# Patient Record
Sex: FEMALE | Race: BLACK OR AFRICAN AMERICAN | NOT HISPANIC OR LATINO
[De-identification: names, ages, dates, MRNs, and addresses within clinical notes are randomized per-mention and may not be internally consistent; named-entity substitution may affect disease eponyms.]

---

## 2017-01-05 ENCOUNTER — LABORATORY RESULT (OUTPATIENT)
Age: 52
End: 2017-01-05

## 2017-01-05 ENCOUNTER — APPOINTMENT (OUTPATIENT)
Dept: RHEUMATOLOGY | Facility: CLINIC | Age: 52
End: 2017-01-05

## 2017-01-05 VITALS
DIASTOLIC BLOOD PRESSURE: 90 MMHG | WEIGHT: 158 LBS | HEART RATE: 75 BPM | TEMPERATURE: 97.9 F | OXYGEN SATURATION: 97 % | HEIGHT: 66 IN | SYSTOLIC BLOOD PRESSURE: 137 MMHG | BODY MASS INDEX: 25.39 KG/M2

## 2017-01-06 ENCOUNTER — CHART COPY (OUTPATIENT)
Age: 52
End: 2017-01-06

## 2017-01-09 LAB
ADJUSTED MITOGEN: >10 IU/ML
ADJUSTED TB AG: 0.01 IU/ML
ALBUMIN SERPL ELPH-MCNC: 4.2 G/DL
ALBUMIN SERPL ELPH-MCNC: 4.3 G/DL
ALP BLD-CCNC: 71 U/L
ALP BLD-CCNC: 72 U/L
ALT SERPL-CCNC: 55 U/L
ALT SERPL-CCNC: 55 U/L
ANION GAP SERPL CALC-SCNC: 17 MMOL/L
ANION GAP SERPL CALC-SCNC: 17 MMOL/L
AST SERPL-CCNC: 70 U/L
AST SERPL-CCNC: 72 U/L
BASOPHILS # BLD AUTO: 0.02 K/UL
BASOPHILS # BLD AUTO: 0.03 K/UL
BASOPHILS NFR BLD AUTO: 0.3 %
BASOPHILS NFR BLD AUTO: 0.4 %
BILIRUB SERPL-MCNC: 0.4 MG/DL
BILIRUB SERPL-MCNC: 0.5 MG/DL
BUN SERPL-MCNC: 9 MG/DL
BUN SERPL-MCNC: 9 MG/DL
CALCIUM SERPL-MCNC: 9.5 MG/DL
CALCIUM SERPL-MCNC: 9.7 MG/DL
CHLORIDE SERPL-SCNC: 100 MMOL/L
CHLORIDE SERPL-SCNC: 101 MMOL/L
CK SERPL-CCNC: 2430 U/L
CK SERPL-CCNC: 2441 U/L
CO2 SERPL-SCNC: 25 MMOL/L
CO2 SERPL-SCNC: 26 MMOL/L
CREAT SERPL-MCNC: 0.67 MG/DL
CREAT SERPL-MCNC: 0.68 MG/DL
CRP SERPL-MCNC: 0.58 MG/DL
CRP SERPL-MCNC: 0.58 MG/DL
EOSINOPHIL # BLD AUTO: 0.08 K/UL
EOSINOPHIL # BLD AUTO: 0.09 K/UL
EOSINOPHIL NFR BLD AUTO: 1.1 %
EOSINOPHIL NFR BLD AUTO: 1.3 %
ERYTHROCYTE [SEDIMENTATION RATE] IN BLOOD BY WESTERGREN METHOD: 32 MM/HR
ERYTHROCYTE [SEDIMENTATION RATE] IN BLOOD BY WESTERGREN METHOD: 32 MM/HR
GLUCOSE SERPL-MCNC: 87 MG/DL
GLUCOSE SERPL-MCNC: 91 MG/DL
HAV IGM SER QL: NONREACTIVE
HBV CORE IGG+IGM SER QL: NONREACTIVE
HBV CORE IGM SER QL: NONREACTIVE
HBV SURFACE AG SER QL: NONREACTIVE
HCT VFR BLD CALC: 42.6 %
HCT VFR BLD CALC: 43.5 %
HCV AB SER QL: NONREACTIVE
HCV S/CO RATIO: 0.09 S/CO
HGB BLD-MCNC: 13.5 G/DL
HGB BLD-MCNC: 13.7 G/DL
IGA SER QL IEP: 221 MG/DL
IGG SER QL IEP: 1480 MG/DL
IGM SER QL IEP: 113 MG/DL
IMM GRANULOCYTES NFR BLD AUTO: 0.3 %
IMM GRANULOCYTES NFR BLD AUTO: 0.3 %
LDH SERPL-CCNC: 418 U/L
LYMPHOCYTES # BLD AUTO: 1.64 K/UL
LYMPHOCYTES # BLD AUTO: 1.73 K/UL
LYMPHOCYTES NFR BLD AUTO: 23.7 %
LYMPHOCYTES NFR BLD AUTO: 24.4 %
M TB IFN-G BLD-IMP: NEGATIVE
MAN DIFF?: NORMAL
MAN DIFF?: NORMAL
MCHC RBC-ENTMCNC: 27.8 PG
MCHC RBC-ENTMCNC: 27.8 PG
MCHC RBC-ENTMCNC: 31.5 GM/DL
MCHC RBC-ENTMCNC: 31.7 GM/DL
MCV RBC AUTO: 87.8 FL
MCV RBC AUTO: 88.2 FL
MONOCYTES # BLD AUTO: 0.44 K/UL
MONOCYTES # BLD AUTO: 0.47 K/UL
MONOCYTES NFR BLD AUTO: 6.2 %
MONOCYTES NFR BLD AUTO: 6.8 %
NEUTROPHILS # BLD AUTO: 4.66 K/UL
NEUTROPHILS # BLD AUTO: 4.8 K/UL
NEUTROPHILS NFR BLD AUTO: 67.5 %
NEUTROPHILS NFR BLD AUTO: 67.7 %
PLATELET # BLD AUTO: 199 K/UL
PLATELET # BLD AUTO: 252 K/UL
POTASSIUM SERPL-SCNC: 4.6 MMOL/L
POTASSIUM SERPL-SCNC: 4.9 MMOL/L
PROT SERPL-MCNC: 7.3 G/DL
PROT SERPL-MCNC: 7.4 G/DL
QUANTIFERON GOLD NIL: 0.04 IU/ML
RBC # BLD: 4.85 M/UL
RBC # BLD: 4.93 M/UL
RBC # FLD: 16 %
RBC # FLD: 16 %
SODIUM SERPL-SCNC: 143 MMOL/L
SODIUM SERPL-SCNC: 143 MMOL/L
WBC # FLD AUTO: 6.91 K/UL
WBC # FLD AUTO: 7.09 K/UL

## 2017-01-21 ENCOUNTER — LABORATORY RESULT (OUTPATIENT)
Age: 52
End: 2017-01-21

## 2017-01-23 ENCOUNTER — CLINICAL ADVICE (OUTPATIENT)
Age: 52
End: 2017-01-23

## 2017-02-03 ENCOUNTER — CLINICAL ADVICE (OUTPATIENT)
Age: 52
End: 2017-02-03

## 2017-02-03 ENCOUNTER — APPOINTMENT (OUTPATIENT)
Dept: RHEUMATOLOGY | Facility: CLINIC | Age: 52
End: 2017-02-03

## 2017-02-03 VITALS
OXYGEN SATURATION: 97 % | TEMPERATURE: 97.7 F | BODY MASS INDEX: 24.91 KG/M2 | WEIGHT: 155 LBS | SYSTOLIC BLOOD PRESSURE: 125 MMHG | HEART RATE: 86 BPM | HEIGHT: 66 IN | DIASTOLIC BLOOD PRESSURE: 83 MMHG

## 2017-02-06 LAB
ALBUMIN SERPL ELPH-MCNC: 4.2 G/DL
ALP BLD-CCNC: 57 U/L
ALT SERPL-CCNC: 56 U/L
ANION GAP SERPL CALC-SCNC: 17 MMOL/L
AST SERPL-CCNC: 69 U/L
BASOPHILS # BLD AUTO: 0.02 K/UL
BASOPHILS NFR BLD AUTO: 0.3 %
BILIRUB SERPL-MCNC: 0.8 MG/DL
BUN SERPL-MCNC: 14 MG/DL
CALCIUM SERPL-MCNC: 10.2 MG/DL
CHLORIDE SERPL-SCNC: 99 MMOL/L
CHOLEST SERPL-MCNC: 243 MG/DL
CHOLEST/HDLC SERPL: 3.2 RATIO
CK SERPL-CCNC: 1929 U/L
CO2 SERPL-SCNC: 25 MMOL/L
CREAT SERPL-MCNC: 0.78 MG/DL
CRP SERPL-MCNC: 0.37 MG/DL
EOSINOPHIL # BLD AUTO: 0.1 K/UL
EOSINOPHIL NFR BLD AUTO: 1.5 %
ERYTHROCYTE [SEDIMENTATION RATE] IN BLOOD BY WESTERGREN METHOD: 27 MM/HR
GGT SERPL-CCNC: 53 U/L
GLUCOSE SERPL-MCNC: 83 MG/DL
HBA1C MFR BLD HPLC: 6.3 %
HCT VFR BLD CALC: 44.8 %
HDLC SERPL-MCNC: 75 MG/DL
HGB BLD-MCNC: 14.5 G/DL
IMM GRANULOCYTES NFR BLD AUTO: 0.2 %
LDLC SERPL CALC-MCNC: 154 MG/DL
LYMPHOCYTES # BLD AUTO: 2.17 K/UL
LYMPHOCYTES NFR BLD AUTO: 33.1 %
MAN DIFF?: NORMAL
MCHC RBC-ENTMCNC: 28.2 PG
MCHC RBC-ENTMCNC: 32.4 GM/DL
MCV RBC AUTO: 87.2 FL
MONOCYTES # BLD AUTO: 0.47 K/UL
MONOCYTES NFR BLD AUTO: 7.2 %
NEUTROPHILS # BLD AUTO: 3.78 K/UL
NEUTROPHILS NFR BLD AUTO: 57.7 %
PLATELET # BLD AUTO: 147 K/UL
POTASSIUM SERPL-SCNC: 4 MMOL/L
PROT SERPL-MCNC: 8 G/DL
RBC # BLD: 5.14 M/UL
RBC # FLD: 16.1 %
SODIUM SERPL-SCNC: 141 MMOL/L
TRIGL SERPL-MCNC: 72 MG/DL
WBC # FLD AUTO: 6.55 K/UL

## 2017-03-01 ENCOUNTER — RX RENEWAL (OUTPATIENT)
Age: 52
End: 2017-03-01

## 2017-03-06 ENCOUNTER — RX RENEWAL (OUTPATIENT)
Age: 52
End: 2017-03-06

## 2017-03-21 ENCOUNTER — APPOINTMENT (OUTPATIENT)
Dept: RHEUMATOLOGY | Facility: CLINIC | Age: 52
End: 2017-03-21

## 2017-03-21 VITALS
OXYGEN SATURATION: 97 % | DIASTOLIC BLOOD PRESSURE: 75 MMHG | WEIGHT: 153 LBS | SYSTOLIC BLOOD PRESSURE: 112 MMHG | HEIGHT: 66 IN | BODY MASS INDEX: 24.59 KG/M2 | HEART RATE: 58 BPM

## 2017-03-22 LAB
25(OH)D3 SERPL-MCNC: 40.1 NG/ML
ALBUMIN SERPL ELPH-MCNC: 4.5 G/DL
ALP BLD-CCNC: 56 U/L
ALT SERPL-CCNC: 41 U/L
ANION GAP SERPL CALC-SCNC: 14 MMOL/L
AST SERPL-CCNC: 59 U/L
BASOPHILS # BLD AUTO: 0.02 K/UL
BASOPHILS NFR BLD AUTO: 0.2 %
BILIRUB SERPL-MCNC: 0.6 MG/DL
BUN SERPL-MCNC: 15 MG/DL
CALCIUM SERPL-MCNC: 9.8 MG/DL
CHLORIDE SERPL-SCNC: 102 MMOL/L
CK SERPL-CCNC: 1898 U/L
CO2 SERPL-SCNC: 26 MMOL/L
CREAT SERPL-MCNC: 0.59 MG/DL
CRP SERPL-MCNC: 0.22 MG/DL
EOSINOPHIL # BLD AUTO: 0.05 K/UL
EOSINOPHIL NFR BLD AUTO: 0.5 %
ERYTHROCYTE [SEDIMENTATION RATE] IN BLOOD BY WESTERGREN METHOD: 36 MM/HR
GLUCOSE SERPL-MCNC: 90 MG/DL
HCT VFR BLD CALC: 41.5 %
HGB BLD-MCNC: 13.6 G/DL
IMM GRANULOCYTES NFR BLD AUTO: 0.1 %
LYMPHOCYTES # BLD AUTO: 1.65 K/UL
LYMPHOCYTES NFR BLD AUTO: 17.9 %
MAN DIFF?: NORMAL
MCHC RBC-ENTMCNC: 28.3 PG
MCHC RBC-ENTMCNC: 32.8 GM/DL
MCV RBC AUTO: 86.5 FL
MONOCYTES # BLD AUTO: 0.5 K/UL
MONOCYTES NFR BLD AUTO: 5.4 %
NEUTROPHILS # BLD AUTO: 6.98 K/UL
NEUTROPHILS NFR BLD AUTO: 75.9 %
PLATELET # BLD AUTO: 131 K/UL
POTASSIUM SERPL-SCNC: 3.9 MMOL/L
PROT SERPL-MCNC: 7.4 G/DL
RBC # BLD: 4.8 M/UL
RBC # FLD: 16.5 %
SODIUM SERPL-SCNC: 142 MMOL/L
WBC # FLD AUTO: 9.21 K/UL

## 2017-04-06 ENCOUNTER — APPOINTMENT (OUTPATIENT)
Dept: RHEUMATOLOGY | Facility: CLINIC | Age: 52
End: 2017-04-06

## 2017-05-11 ENCOUNTER — LABORATORY RESULT (OUTPATIENT)
Age: 52
End: 2017-05-11

## 2017-05-11 ENCOUNTER — APPOINTMENT (OUTPATIENT)
Dept: RHEUMATOLOGY | Facility: CLINIC | Age: 52
End: 2017-05-11

## 2017-05-11 VITALS
DIASTOLIC BLOOD PRESSURE: 82 MMHG | BODY MASS INDEX: 24.11 KG/M2 | TEMPERATURE: 98 F | HEIGHT: 66 IN | SYSTOLIC BLOOD PRESSURE: 138 MMHG | WEIGHT: 150 LBS | OXYGEN SATURATION: 98 %

## 2017-05-11 RX ORDER — PREDNISONE 2.5 MG/1
2.5 TABLET ORAL
Qty: 60 | Refills: 0 | Status: DISCONTINUED | COMMUNITY
Start: 2017-01-05 | End: 2017-05-11

## 2017-05-15 LAB
ALBUMIN SERPL ELPH-MCNC: 4.3 G/DL
ALP BLD-CCNC: 59 U/L
ALT SERPL-CCNC: 42 U/L
ANION GAP SERPL CALC-SCNC: 13 MMOL/L
AST SERPL-CCNC: 51 U/L
BASOPHILS # BLD AUTO: 0.04 K/UL
BASOPHILS NFR BLD AUTO: 0.5 %
BILIRUB SERPL-MCNC: 0.6 MG/DL
BUN SERPL-MCNC: 14 MG/DL
CALCIUM SERPL-MCNC: 9.7 MG/DL
CHLORIDE SERPL-SCNC: 101 MMOL/L
CHOLEST SERPL-MCNC: 248 MG/DL
CHOLEST/HDLC SERPL: 3 RATIO
CK SERPL-CCNC: 1721 U/L
CO2 SERPL-SCNC: 27 MMOL/L
CREAT SERPL-MCNC: 0.66 MG/DL
CRP SERPL-MCNC: 0.2 MG/DL
EOSINOPHIL # BLD AUTO: 0.15 K/UL
EOSINOPHIL NFR BLD AUTO: 2 %
ERYTHROCYTE [SEDIMENTATION RATE] IN BLOOD BY WESTERGREN METHOD: 36 MM/HR
FOLATE SERPL-MCNC: 16.4 NG/ML
GLUCOSE SERPL-MCNC: 95 MG/DL
HBA1C MFR BLD HPLC: 5.8 %
HCT VFR BLD CALC: 39.4 %
HDLC SERPL-MCNC: 89 MG/DL
HGB BLD-MCNC: 12.7 G/DL
IMM GRANULOCYTES NFR BLD AUTO: 0.3 %
LDLC SERPL CALC-MCNC: 146 MG/DL
LYMPHOCYTES # BLD AUTO: 2.38 K/UL
LYMPHOCYTES NFR BLD AUTO: 30.9 %
MAN DIFF?: NORMAL
MCHC RBC-ENTMCNC: 28.6 PG
MCHC RBC-ENTMCNC: 32.2 GM/DL
MCV RBC AUTO: 88.7 FL
MONOCYTES # BLD AUTO: 0.54 K/UL
MONOCYTES NFR BLD AUTO: 7 %
NEUTROPHILS # BLD AUTO: 4.56 K/UL
NEUTROPHILS NFR BLD AUTO: 59.3 %
PLATELET # BLD AUTO: NORMAL
POTASSIUM SERPL-SCNC: 4.4 MMOL/L
PROT SERPL-MCNC: 7.3 G/DL
RBC # BLD: 4.44 M/UL
RBC # FLD: 16.6 %
SODIUM SERPL-SCNC: 141 MMOL/L
TRIGL SERPL-MCNC: 67 MG/DL
TSH SERPL-ACNC: 1.8 UIU/ML
VIT B12 SERPL-MCNC: 337 PG/ML
WBC # FLD AUTO: 7.69 K/UL

## 2017-05-18 ENCOUNTER — RX RENEWAL (OUTPATIENT)
Age: 52
End: 2017-05-18

## 2017-06-14 ENCOUNTER — RX RENEWAL (OUTPATIENT)
Age: 52
End: 2017-06-14

## 2017-06-15 ENCOUNTER — LABORATORY RESULT (OUTPATIENT)
Age: 52
End: 2017-06-15

## 2017-06-15 ENCOUNTER — APPOINTMENT (OUTPATIENT)
Dept: RHEUMATOLOGY | Facility: CLINIC | Age: 52
End: 2017-06-15

## 2017-06-15 VITALS
BODY MASS INDEX: 24.59 KG/M2 | HEART RATE: 72 BPM | TEMPERATURE: 97.7 F | SYSTOLIC BLOOD PRESSURE: 120 MMHG | DIASTOLIC BLOOD PRESSURE: 80 MMHG | WEIGHT: 153 LBS | HEIGHT: 66 IN | OXYGEN SATURATION: 98 %

## 2017-06-20 LAB
25(OH)D3 SERPL-MCNC: 61.8 NG/ML
ALBUMIN SERPL ELPH-MCNC: 4.4 G/DL
ALP BLD-CCNC: 62 U/L
ALT SERPL-CCNC: 48 U/L
ANION GAP SERPL CALC-SCNC: 17 MMOL/L
APPEARANCE: CLEAR
APTT 2H P 1:4 NP PPP: 34.9 SEC
APTT 2H P INC PPP: 36.1 SEC
APTT IMM NP/PRE NP PPP: 34.5 SEC
APTT INV RATIO PPP: 38.6 SEC
AST SERPL-CCNC: 54 U/L
B BURGDOR AB SER-IMP: NEGATIVE
B BURGDOR IGG+IGM SER QL IB: NORMAL
B BURGDOR IGM PATRN SER IB-IMP: NEGATIVE
B BURGDOR18/20KD IGM SER QL IB: NORMAL
B BURGDOR18KD IGG SER QL IB: NORMAL
B BURGDOR23KD IGG SER QL IB: NORMAL
B BURGDOR23KD IGM SER QL IB: NORMAL
B BURGDOR28KD AB SER QL IB: NORMAL
B BURGDOR28KD IGG SER QL IB: NORMAL
B BURGDOR30KD AB SER QL IB: NORMAL
B BURGDOR30KD IGG SER QL IB: NORMAL
B BURGDOR31KD IGG SER QL IB: NORMAL
B BURGDOR31KD IGM SER QL IB: NORMAL
B BURGDOR39KD IGG SER QL IB: NORMAL
B BURGDOR39KD IGM SER QL IB: NORMAL
B BURGDOR41KD IGG SER QL IB: NORMAL
B BURGDOR41KD IGM SER QL IB: PRESENT
B BURGDOR45KD AB SER QL IB: NORMAL
B BURGDOR45KD IGG SER QL IB: NORMAL
B BURGDOR58KD AB SER QL IB: NORMAL
B BURGDOR58KD IGG SER QL IB: NORMAL
B BURGDOR66KD IGG SER QL IB: NORMAL
B BURGDOR66KD IGM SER QL IB: NORMAL
B BURGDOR93KD IGG SER QL IB: NORMAL
B BURGDOR93KD IGM SER QL IB: NORMAL
B2 GLYCOPROT1 AB SER QL: NEGATIVE
B2 GLYCOPROT1 IGA SERPL IA-ACNC: <5 SAU
BACTERIA: NEGATIVE
BASOPHILS # BLD AUTO: 0.02 K/UL
BASOPHILS NFR BLD AUTO: 0.3 %
BILIRUB SERPL-MCNC: 0.7 MG/DL
BILIRUBIN URINE: NEGATIVE
BLOOD URINE: NEGATIVE
BUN SERPL-MCNC: 14 MG/DL
CALCIUM SERPL-MCNC: 9.3 MG/DL
CARDIOLIPIN AB SER IA-ACNC: NEGATIVE
CHLORIDE SERPL-SCNC: 101 MMOL/L
CK SERPL-CCNC: 1883 U/L
CO2 SERPL-SCNC: 25 MMOL/L
COLOR: YELLOW
CONFIRM: 26.7 SEC
CREAT SERPL-MCNC: 0.66 MG/DL
CREAT SPEC-SCNC: 17 MG/DL
CREAT/PROT UR: NORMAL
CRP SERPL-MCNC: 0.2 MG/DL
DRVVT IMM 1:2 NP PPP: NORMAL
DRVVT SCREEN TO CONFIRM RATIO: 1.02 RATIO
ENA SS-A AB SER IA-ACNC: <0.2 AL
ENA SS-B AB SER IA-ACNC: <0.2 AL
EOSINOPHIL # BLD AUTO: 0.07 K/UL
EOSINOPHIL NFR BLD AUTO: 1.1 %
ERYTHROCYTE [SEDIMENTATION RATE] IN BLOOD BY WESTERGREN METHOD: 23 MM/HR
GLUCOSE QUALITATIVE U: NORMAL MG/DL
GLUCOSE SERPL-MCNC: 97 MG/DL
HCT VFR BLD CALC: 40.7 %
HGB BLD-MCNC: 12.8 G/DL
HYALINE CASTS: 1 /LPF
IMM GRANULOCYTES NFR BLD AUTO: 0.2 %
KETONES URINE: NEGATIVE
LEUKOCYTE ESTERASE URINE: NEGATIVE
LYMPHOCYTES # BLD AUTO: 2.13 K/UL
LYMPHOCYTES NFR BLD AUTO: 34.8 %
MAN DIFF?: NORMAL
MCHC RBC-ENTMCNC: 28.3 PG
MCHC RBC-ENTMCNC: 31.4 GM/DL
MCV RBC AUTO: 89.8 FL
MICROSCOPIC-UA: NORMAL
MONOCYTES # BLD AUTO: 0.39 K/UL
MONOCYTES NFR BLD AUTO: 6.4 %
NEUTROPHILS # BLD AUTO: 3.5 K/UL
NEUTROPHILS NFR BLD AUTO: 57.2 %
NITRITE URINE: NEGATIVE
NPP NORMAL POOLED PLASMA: 32.7 SEC
PH URINE: 6.5
PLATELET # BLD AUTO: 159 K/UL
POTASSIUM SERPL-SCNC: 4.1 MMOL/L
PROT SERPL-MCNC: 7.9 G/DL
PROT UR-MCNC: <4 MG/DL
PROTEIN URINE: NEGATIVE MG/DL
RBC # BLD: 4.53 M/UL
RBC # FLD: 15.8 %
RED BLOOD CELLS URINE: 1 /HPF
SCREEN DRVVT: 30.5 SEC
SILICA CLOTTING TIME INTERPRETATION: NORMAL
SILICA CLOTTING TIME S/C: 1.15 RATIO
SODIUM SERPL-SCNC: 143 MMOL/L
SPECIFIC GRAVITY URINE: 1.01
SQUAMOUS EPITHELIAL CELLS: 1 /HPF
UROBILINOGEN URINE: NORMAL MG/DL
WBC # FLD AUTO: 6.12 K/UL
WHITE BLOOD CELLS URINE: 1 /HPF

## 2017-06-26 ENCOUNTER — CHART COPY (OUTPATIENT)
Age: 52
End: 2017-06-26

## 2017-08-11 ENCOUNTER — APPOINTMENT (OUTPATIENT)
Dept: RHEUMATOLOGY | Facility: CLINIC | Age: 52
End: 2017-08-11
Payer: COMMERCIAL

## 2017-08-11 VITALS
OXYGEN SATURATION: 98 % | HEART RATE: 67 BPM | WEIGHT: 153 LBS | SYSTOLIC BLOOD PRESSURE: 121 MMHG | HEIGHT: 66 IN | BODY MASS INDEX: 24.59 KG/M2 | DIASTOLIC BLOOD PRESSURE: 81 MMHG | TEMPERATURE: 98.1 F

## 2017-08-11 PROCEDURE — 99215 OFFICE O/P EST HI 40 MIN: CPT

## 2017-08-12 LAB
ALBUMIN SERPL ELPH-MCNC: 4.2 G/DL
ALP BLD-CCNC: 64 U/L
ALT SERPL-CCNC: 60 U/L
ANION GAP SERPL CALC-SCNC: 14 MMOL/L
AST SERPL-CCNC: 60 U/L
BASOPHILS # BLD AUTO: 0.02 K/UL
BASOPHILS NFR BLD AUTO: 0.3 %
BILIRUB SERPL-MCNC: 0.5 MG/DL
BUN SERPL-MCNC: 19 MG/DL
CALCIUM SERPL-MCNC: 9.8 MG/DL
CHLORIDE SERPL-SCNC: 102 MMOL/L
CK SERPL-CCNC: 1834 U/L
CO2 SERPL-SCNC: 27 MMOL/L
CREAT SERPL-MCNC: 0.68 MG/DL
CRP SERPL-MCNC: 0.4 MG/DL
EOSINOPHIL # BLD AUTO: 0.13 K/UL
EOSINOPHIL NFR BLD AUTO: 2 %
ERYTHROCYTE [SEDIMENTATION RATE] IN BLOOD BY WESTERGREN METHOD: 30 MM/HR
GLUCOSE SERPL-MCNC: 103 MG/DL
HAV IGM SER QL: NONREACTIVE
HBV CORE IGG+IGM SER QL: NONREACTIVE
HBV CORE IGM SER QL: NONREACTIVE
HBV SURFACE AG SER QL: NONREACTIVE
HCT VFR BLD CALC: 39.7 %
HCV AB SER QL: NONREACTIVE
HCV S/CO RATIO: 0.14 S/CO
HGB BLD-MCNC: 12.7 G/DL
IGA SER QL IEP: 168 MG/DL
IGG SER QL IEP: 1300 MG/DL
IGM SER QL IEP: 83 MG/DL
IMM GRANULOCYTES NFR BLD AUTO: 0.2 %
LYMPHOCYTES # BLD AUTO: 1.93 K/UL
LYMPHOCYTES NFR BLD AUTO: 29 %
MAN DIFF?: NORMAL
MCHC RBC-ENTMCNC: 28.5 PG
MCHC RBC-ENTMCNC: 32 GM/DL
MCV RBC AUTO: 89.2 FL
MONOCYTES # BLD AUTO: 0.39 K/UL
MONOCYTES NFR BLD AUTO: 5.9 %
NEUTROPHILS # BLD AUTO: 4.18 K/UL
NEUTROPHILS NFR BLD AUTO: 62.6 %
PLATELET # BLD AUTO: 143 K/UL
POTASSIUM SERPL-SCNC: 4.5 MMOL/L
PROT SERPL-MCNC: 7.3 G/DL
RBC # BLD: 4.45 M/UL
RBC # FLD: 15.8 %
SODIUM SERPL-SCNC: 143 MMOL/L
WBC # FLD AUTO: 6.66 K/UL

## 2017-08-14 LAB
ADJUSTED MITOGEN: >10 IU/ML
ADJUSTED TB AG: 0.01 IU/ML
CELLS.CD3-CD19+/CELLS IN BLOOD: 13 %
M TB IFN-G BLD-IMP: NEGATIVE
QUANTIFERON GOLD NIL: 0.03 IU/ML

## 2017-10-05 ENCOUNTER — RX RENEWAL (OUTPATIENT)
Age: 52
End: 2017-10-05

## 2017-10-11 ENCOUNTER — APPOINTMENT (OUTPATIENT)
Dept: RHEUMATOLOGY | Facility: CLINIC | Age: 52
End: 2017-10-11

## 2017-10-17 ENCOUNTER — APPOINTMENT (OUTPATIENT)
Dept: RHEUMATOLOGY | Facility: CLINIC | Age: 52
End: 2017-10-17
Payer: COMMERCIAL

## 2017-10-17 VITALS
HEIGHT: 66 IN | BODY MASS INDEX: 24.59 KG/M2 | SYSTOLIC BLOOD PRESSURE: 153 MMHG | TEMPERATURE: 98 F | OXYGEN SATURATION: 98 % | WEIGHT: 153 LBS | DIASTOLIC BLOOD PRESSURE: 97 MMHG | HEART RATE: 71 BPM

## 2017-10-17 PROCEDURE — 99215 OFFICE O/P EST HI 40 MIN: CPT

## 2017-10-18 ENCOUNTER — CLINICAL ADVICE (OUTPATIENT)
Age: 52
End: 2017-10-18

## 2017-10-18 LAB
ALBUMIN SERPL ELPH-MCNC: 4.5 G/DL
ALP BLD-CCNC: 63 U/L
ALT SERPL-CCNC: 60 U/L
ANION GAP SERPL CALC-SCNC: 14 MMOL/L
AST SERPL-CCNC: 66 U/L
BASOPHILS # BLD AUTO: 0.01 K/UL
BASOPHILS NFR BLD AUTO: 0.2 %
BILIRUB SERPL-MCNC: 0.6 MG/DL
BUN SERPL-MCNC: 12 MG/DL
CALCIUM SERPL-MCNC: 10.2 MG/DL
CHLORIDE SERPL-SCNC: 104 MMOL/L
CK SERPL-CCNC: 2714 U/L
CO2 SERPL-SCNC: 26 MMOL/L
CREAT SERPL-MCNC: 0.63 MG/DL
CRP SERPL-MCNC: 0.4 MG/DL
EOSINOPHIL # BLD AUTO: 0.05 K/UL
EOSINOPHIL NFR BLD AUTO: 1 %
ERYTHROCYTE [SEDIMENTATION RATE] IN BLOOD BY WESTERGREN METHOD: 30 MM/HR
GLUCOSE SERPL-MCNC: 97 MG/DL
HBA1C MFR BLD HPLC: 5.7 %
HCT VFR BLD CALC: 39.3 %
HGB BLD-MCNC: 12.5 G/DL
IMM GRANULOCYTES NFR BLD AUTO: 0.2 %
LYMPHOCYTES # BLD AUTO: 1.53 K/UL
LYMPHOCYTES NFR BLD AUTO: 29.1 %
MAN DIFF?: NORMAL
MCHC RBC-ENTMCNC: 28.2 PG
MCHC RBC-ENTMCNC: 31.8 GM/DL
MCV RBC AUTO: 88.5 FL
MONOCYTES # BLD AUTO: 0.28 K/UL
MONOCYTES NFR BLD AUTO: 5.3 %
NEUTROPHILS # BLD AUTO: 3.37 K/UL
NEUTROPHILS NFR BLD AUTO: 64.2 %
PLATELET # BLD AUTO: 141 K/UL
POTASSIUM SERPL-SCNC: 4.9 MMOL/L
PROT SERPL-MCNC: 7.5 G/DL
RBC # BLD: 4.44 M/UL
RBC # FLD: 15.2 %
SODIUM SERPL-SCNC: 144 MMOL/L
WBC # FLD AUTO: 5.25 K/UL

## 2017-11-04 ENCOUNTER — LABORATORY RESULT (OUTPATIENT)
Age: 52
End: 2017-11-04

## 2017-11-06 ENCOUNTER — MOBILE ON CALL (OUTPATIENT)
Age: 52
End: 2017-11-06

## 2017-11-08 LAB
BASOPHILS # BLD AUTO: 0.02 K/UL
BASOPHILS NFR BLD AUTO: 0.3 %
EOSINOPHIL # BLD AUTO: 0.17 K/UL
EOSINOPHIL NFR BLD AUTO: 2.8 %
HCT VFR BLD CALC: 40.3 %
HGB BLD-MCNC: 12.7 G/DL
IMM GRANULOCYTES NFR BLD AUTO: 0 %
LYMPHOCYTES # BLD AUTO: 2.12 K/UL
LYMPHOCYTES NFR BLD AUTO: 34.5 %
MAN DIFF?: NORMAL
MCHC RBC-ENTMCNC: 28.2 PG
MCHC RBC-ENTMCNC: 31.5 GM/DL
MCV RBC AUTO: 89.6 FL
MONOCYTES # BLD AUTO: 0.4 K/UL
MONOCYTES NFR BLD AUTO: 6.5 %
NEUTROPHILS # BLD AUTO: 3.43 K/UL
NEUTROPHILS NFR BLD AUTO: 55.9 %
PLATELET # BLD AUTO: 119 K/UL
RBC # BLD: 4.5 M/UL
RBC # FLD: 15.8 %
WBC # FLD AUTO: 6.14 K/UL

## 2017-11-11 ENCOUNTER — LABORATORY RESULT (OUTPATIENT)
Age: 52
End: 2017-11-11

## 2017-11-13 RX ORDER — METHOTREXATE 2.5 MG/1
2.5 TABLET ORAL
Qty: 103 | Refills: 0 | Status: DISCONTINUED | COMMUNITY
Start: 2017-09-02 | End: 2017-11-13

## 2017-11-13 RX ORDER — METHOTREXATE 2.5 MG/1
2.5 TABLET ORAL WEEKLY
Qty: 78 | Refills: 1 | Status: DISCONTINUED | COMMUNITY
Start: 2017-01-05 | End: 2017-11-13

## 2017-11-16 ENCOUNTER — OTHER (OUTPATIENT)
Age: 52
End: 2017-11-16

## 2017-12-07 ENCOUNTER — APPOINTMENT (OUTPATIENT)
Dept: RHEUMATOLOGY | Facility: CLINIC | Age: 52
End: 2017-12-07
Payer: COMMERCIAL

## 2017-12-07 VITALS
SYSTOLIC BLOOD PRESSURE: 116 MMHG | HEIGHT: 66 IN | OXYGEN SATURATION: 98 % | WEIGHT: 164 LBS | BODY MASS INDEX: 26.36 KG/M2 | HEART RATE: 68 BPM | TEMPERATURE: 98 F | DIASTOLIC BLOOD PRESSURE: 80 MMHG | RESPIRATION RATE: 16 BRPM

## 2017-12-07 PROCEDURE — 99215 OFFICE O/P EST HI 40 MIN: CPT

## 2017-12-11 LAB
ALBUMIN SERPL ELPH-MCNC: 4.4 G/DL
ALP BLD-CCNC: 63 U/L
ALT SERPL-CCNC: 39 U/L
ANION GAP SERPL CALC-SCNC: 14 MMOL/L
APPEARANCE: CLEAR
AST SERPL-CCNC: 48 U/L
BACTERIA: NEGATIVE
BASOPHILS # BLD AUTO: 0.02 K/UL
BASOPHILS NFR BLD AUTO: 0.3 %
BILIRUB SERPL-MCNC: 0.5 MG/DL
BILIRUBIN URINE: NEGATIVE
BLOOD URINE: NEGATIVE
BUN SERPL-MCNC: 12 MG/DL
CALCIUM SERPL-MCNC: 10 MG/DL
CHLORIDE SERPL-SCNC: 102 MMOL/L
CHOLEST SERPL-MCNC: 258 MG/DL
CHOLEST/HDLC SERPL: 3.1 RATIO
CK SERPL-CCNC: 1605 U/L
CO2 SERPL-SCNC: 27 MMOL/L
COLOR: YELLOW
CREAT SERPL-MCNC: 0.71 MG/DL
CRP SERPL-MCNC: <0.2 MG/DL
EOSINOPHIL # BLD AUTO: 0.12 K/UL
EOSINOPHIL NFR BLD AUTO: 1.9 %
ERYTHROCYTE [SEDIMENTATION RATE] IN BLOOD BY WESTERGREN METHOD: 23 MM/HR
GLUCOSE QUALITATIVE U: NEGATIVE MG/DL
GLUCOSE SERPL-MCNC: 99 MG/DL
HBA1C MFR BLD HPLC: 5.7 %
HCT VFR BLD CALC: 41.9 %
HDLC SERPL-MCNC: 82 MG/DL
HGB BLD-MCNC: 13.5 G/DL
IMM GRANULOCYTES NFR BLD AUTO: 0.3 %
KETONES URINE: NEGATIVE
LDLC SERPL CALC-MCNC: 164 MG/DL
LEUKOCYTE ESTERASE URINE: NEGATIVE
LYMPHOCYTES # BLD AUTO: 2.22 K/UL
LYMPHOCYTES NFR BLD AUTO: 34.7 %
MAN DIFF?: NORMAL
MCHC RBC-ENTMCNC: 28 PG
MCHC RBC-ENTMCNC: 32.2 GM/DL
MCV RBC AUTO: 86.9 FL
MICROSCOPIC-UA: NORMAL
MONOCYTES # BLD AUTO: 0.4 K/UL
MONOCYTES NFR BLD AUTO: 6.3 %
NEUTROPHILS # BLD AUTO: 3.61 K/UL
NEUTROPHILS NFR BLD AUTO: 56.5 %
NITRITE URINE: NEGATIVE
PH URINE: 7
PLATELET # BLD AUTO: 132 K/UL
POTASSIUM SERPL-SCNC: 4.2 MMOL/L
PROT SERPL-MCNC: 7.6 G/DL
PROTEIN URINE: NEGATIVE MG/DL
RBC # BLD: 4.82 M/UL
RBC # FLD: 15.5 %
RED BLOOD CELLS URINE: 2 /HPF
SODIUM SERPL-SCNC: 143 MMOL/L
SPECIFIC GRAVITY URINE: 1.01
SQUAMOUS EPITHELIAL CELLS: 1 /HPF
TRIGL SERPL-MCNC: 61 MG/DL
TSH SERPL-ACNC: 2.06 UIU/ML
UROBILINOGEN URINE: NEGATIVE MG/DL
WBC # FLD AUTO: 6.39 K/UL
WHITE BLOOD CELLS URINE: 0 /HPF

## 2018-02-01 ENCOUNTER — APPOINTMENT (OUTPATIENT)
Dept: RHEUMATOLOGY | Facility: CLINIC | Age: 53
End: 2018-02-01
Payer: COMMERCIAL

## 2018-02-01 ENCOUNTER — LABORATORY RESULT (OUTPATIENT)
Age: 53
End: 2018-02-01

## 2018-02-01 VITALS
WEIGHT: 153 LBS | HEART RATE: 70 BPM | OXYGEN SATURATION: 98 % | TEMPERATURE: 98 F | HEIGHT: 66 IN | RESPIRATION RATE: 16 BRPM | DIASTOLIC BLOOD PRESSURE: 82 MMHG | SYSTOLIC BLOOD PRESSURE: 150 MMHG | BODY MASS INDEX: 24.59 KG/M2

## 2018-02-01 PROCEDURE — 99215 OFFICE O/P EST HI 40 MIN: CPT

## 2018-02-01 RX ORDER — MELOXICAM 7.5 MG/1
7.5 TABLET ORAL TWICE DAILY
Qty: 60 | Refills: 3 | Status: DISCONTINUED | COMMUNITY
Start: 2017-05-11 | End: 2018-02-01

## 2018-02-01 RX ORDER — FOLIC ACID 1 MG/1
1 TABLET ORAL DAILY
Qty: 2 | Refills: 3 | Status: DISCONTINUED | COMMUNITY
Start: 2017-01-05 | End: 2018-02-01

## 2018-02-02 LAB
ALBUMIN SERPL ELPH-MCNC: 4.2 G/DL
ALP BLD-CCNC: 61 U/L
ALT SERPL-CCNC: 31 U/L
ANION GAP SERPL CALC-SCNC: 15 MMOL/L
AST SERPL-CCNC: 39 U/L
BASOPHILS # BLD AUTO: 0.03 K/UL
BASOPHILS NFR BLD AUTO: 0.4 %
BILIRUB SERPL-MCNC: 0.5 MG/DL
BUN SERPL-MCNC: 14 MG/DL
CALCIUM SERPL-MCNC: 9.9 MG/DL
CHLORIDE SERPL-SCNC: 104 MMOL/L
CK SERPL-CCNC: 981 U/L
CO2 SERPL-SCNC: 25 MMOL/L
CREAT SERPL-MCNC: 0.69 MG/DL
CRP SERPL-MCNC: 0.6 MG/DL
EOSINOPHIL # BLD AUTO: 0.11 K/UL
EOSINOPHIL NFR BLD AUTO: 1.3 %
ERYTHROCYTE [SEDIMENTATION RATE] IN BLOOD BY WESTERGREN METHOD: 32 MM/HR
GGT SERPL-CCNC: 45 U/L
GLUCOSE SERPL-MCNC: 100 MG/DL
HCT VFR BLD CALC: 38.8 %
HGB BLD-MCNC: 12.5 G/DL
IMM GRANULOCYTES NFR BLD AUTO: 0.1 %
LYMPHOCYTES # BLD AUTO: 1.57 K/UL
LYMPHOCYTES NFR BLD AUTO: 18.4 %
MAN DIFF?: NORMAL
MCHC RBC-ENTMCNC: 27.5 PG
MCHC RBC-ENTMCNC: 32.2 GM/DL
MCV RBC AUTO: 85.3 FL
MONOCYTES # BLD AUTO: 0.5 K/UL
MONOCYTES NFR BLD AUTO: 5.9 %
NEUTROPHILS # BLD AUTO: 6.29 K/UL
NEUTROPHILS NFR BLD AUTO: 73.9 %
PLATELET # BLD AUTO: NORMAL K/UL
POTASSIUM SERPL-SCNC: 3.9 MMOL/L
PROT SERPL-MCNC: 7 G/DL
RBC # BLD: 4.55 M/UL
RBC # FLD: 15.6 %
SODIUM SERPL-SCNC: 144 MMOL/L
WBC # FLD AUTO: 8.51 K/UL

## 2018-02-10 ENCOUNTER — RX RENEWAL (OUTPATIENT)
Age: 53
End: 2018-02-10

## 2018-03-26 ENCOUNTER — CLINICAL ADVICE (OUTPATIENT)
Age: 53
End: 2018-03-26

## 2018-03-27 ENCOUNTER — LABORATORY RESULT (OUTPATIENT)
Age: 53
End: 2018-03-27

## 2018-03-27 ENCOUNTER — APPOINTMENT (OUTPATIENT)
Dept: RHEUMATOLOGY | Facility: CLINIC | Age: 53
End: 2018-03-27
Payer: COMMERCIAL

## 2018-03-27 VITALS
SYSTOLIC BLOOD PRESSURE: 125 MMHG | HEIGHT: 66 IN | RESPIRATION RATE: 12 BRPM | HEART RATE: 72 BPM | DIASTOLIC BLOOD PRESSURE: 85 MMHG

## 2018-03-27 LAB
ALBUMIN SERPL ELPH-MCNC: 4.1 G/DL
ALP BLD-CCNC: 65 U/L
ALT SERPL-CCNC: 18 U/L
ANION GAP SERPL CALC-SCNC: 14 MMOL/L
APPEARANCE: CLEAR
AST SERPL-CCNC: 22 U/L
BACTERIA: NEGATIVE
BASOPHILS # BLD AUTO: 0.02 K/UL
BASOPHILS NFR BLD AUTO: 0.3 %
BILIRUB SERPL-MCNC: 0.3 MG/DL
BILIRUBIN URINE: NEGATIVE
BLOOD URINE: NEGATIVE
BUN SERPL-MCNC: 13 MG/DL
CALCIUM SERPL-MCNC: 9.7 MG/DL
CHLORIDE SERPL-SCNC: 104 MMOL/L
CK SERPL-CCNC: 694 U/L
CO2 SERPL-SCNC: 24 MMOL/L
COLOR: YELLOW
CREAT SERPL-MCNC: 0.65 MG/DL
CREAT SPEC-SCNC: 14 MG/DL
CREAT/PROT UR: NORMAL
CRP SERPL-MCNC: 0.2 MG/DL
EOSINOPHIL # BLD AUTO: 0.11 K/UL
EOSINOPHIL NFR BLD AUTO: 1.5 %
ERYTHROCYTE [SEDIMENTATION RATE] IN BLOOD BY WESTERGREN METHOD: 20 MM/HR
GLUCOSE QUALITATIVE U: NEGATIVE MG/DL
GLUCOSE SERPL-MCNC: 99 MG/DL
HCT VFR BLD CALC: 38.9 %
HGB BLD-MCNC: 12.2 G/DL
HYALINE CASTS: 0 /LPF
IMM GRANULOCYTES NFR BLD AUTO: 0.1 %
KETONES URINE: NEGATIVE
LEUKOCYTE ESTERASE URINE: NEGATIVE
LYMPHOCYTES # BLD AUTO: 1.95 K/UL
LYMPHOCYTES NFR BLD AUTO: 27 %
MAN DIFF?: NORMAL
MCHC RBC-ENTMCNC: 27.6 PG
MCHC RBC-ENTMCNC: 31.4 GM/DL
MCV RBC AUTO: 88 FL
MICROSCOPIC-UA: NORMAL
MONOCYTES # BLD AUTO: 0.39 K/UL
MONOCYTES NFR BLD AUTO: 5.4 %
NEUTROPHILS # BLD AUTO: 4.75 K/UL
NEUTROPHILS NFR BLD AUTO: 65.7 %
NITRITE URINE: NEGATIVE
PH URINE: 6
PLATELET # BLD AUTO: NORMAL K/UL
POTASSIUM SERPL-SCNC: 4.4 MMOL/L
PROT SERPL-MCNC: 7.2 G/DL
PROT UR-MCNC: <4 MG/DL
PROTEIN URINE: NEGATIVE MG/DL
RBC # BLD: 4.42 M/UL
RBC # FLD: 15.8 %
RED BLOOD CELLS URINE: 0 /HPF
SODIUM SERPL-SCNC: 142 MMOL/L
SPECIFIC GRAVITY URINE: 1.01
SQUAMOUS EPITHELIAL CELLS: 1 /HPF
UROBILINOGEN URINE: NEGATIVE MG/DL
WBC # FLD AUTO: 7.23 K/UL
WHITE BLOOD CELLS URINE: 1 /HPF

## 2018-03-27 PROCEDURE — 99215 OFFICE O/P EST HI 40 MIN: CPT

## 2018-03-29 LAB
AMPHET UR-MCNC: NEGATIVE
BARBITURATES UR-MCNC: NEGATIVE
BENZODIAZ UR-MCNC: NEGATIVE
COCAINE METAB.OTHER UR-MCNC: NEGATIVE
CREATININE, URINE: 14.3 MG/DL
METHADONE UR-MCNC: NEGATIVE
METHAQUALONE UR-MCNC: NEGATIVE
OPIATES UR-MCNC: NEGATIVE
PCP UR-MCNC: NEGATIVE
PROPOXYPH UR QL: NEGATIVE
THC UR QL: NEGATIVE

## 2018-03-30 ENCOUNTER — FORM ENCOUNTER (OUTPATIENT)
Age: 53
End: 2018-03-30

## 2018-03-31 ENCOUNTER — OUTPATIENT (OUTPATIENT)
Dept: OUTPATIENT SERVICES | Facility: HOSPITAL | Age: 53
LOS: 1 days | End: 2018-03-31
Payer: COMMERCIAL

## 2018-03-31 ENCOUNTER — APPOINTMENT (OUTPATIENT)
Dept: MRI IMAGING | Facility: CLINIC | Age: 53
End: 2018-03-31
Payer: COMMERCIAL

## 2018-03-31 DIAGNOSIS — Z98.51 TUBAL LIGATION STATUS: Chronic | ICD-10-CM

## 2018-03-31 DIAGNOSIS — Z00.8 ENCOUNTER FOR OTHER GENERAL EXAMINATION: ICD-10-CM

## 2018-03-31 DIAGNOSIS — Z98.89 OTHER SPECIFIED POSTPROCEDURAL STATES: Chronic | ICD-10-CM

## 2018-03-31 PROCEDURE — 73221 MRI JOINT UPR EXTREM W/O DYE: CPT | Mod: 26,RT

## 2018-03-31 PROCEDURE — 73221 MRI JOINT UPR EXTREM W/O DYE: CPT

## 2018-04-06 ENCOUNTER — OUTPATIENT (OUTPATIENT)
Dept: OUTPATIENT SERVICES | Facility: HOSPITAL | Age: 53
LOS: 1 days | End: 2018-04-06
Payer: COMMERCIAL

## 2018-04-06 VITALS
RESPIRATION RATE: 15 BRPM | HEIGHT: 66 IN | DIASTOLIC BLOOD PRESSURE: 89 MMHG | SYSTOLIC BLOOD PRESSURE: 146 MMHG | TEMPERATURE: 98 F | HEART RATE: 57 BPM | OXYGEN SATURATION: 99 % | WEIGHT: 153 LBS

## 2018-04-06 DIAGNOSIS — Z01.818 ENCOUNTER FOR OTHER PREPROCEDURAL EXAMINATION: ICD-10-CM

## 2018-04-06 DIAGNOSIS — N84.0 POLYP OF CORPUS UTERI: ICD-10-CM

## 2018-04-06 DIAGNOSIS — Z98.89 OTHER SPECIFIED POSTPROCEDURAL STATES: Chronic | ICD-10-CM

## 2018-04-06 DIAGNOSIS — Z98.51 TUBAL LIGATION STATUS: Chronic | ICD-10-CM

## 2018-04-06 DIAGNOSIS — Z98.890 OTHER SPECIFIED POSTPROCEDURAL STATES: Chronic | ICD-10-CM

## 2018-04-06 LAB
ANION GAP SERPL CALC-SCNC: 4 MMOL/L — LOW (ref 5–17)
APTT BLD: 35.3 SEC — SIGNIFICANT CHANGE UP (ref 27.5–37.4)
BUN SERPL-MCNC: 10 MG/DL — SIGNIFICANT CHANGE UP (ref 7–18)
CALCIUM SERPL-MCNC: 9 MG/DL — SIGNIFICANT CHANGE UP (ref 8.4–10.5)
CHLORIDE SERPL-SCNC: 107 MMOL/L — SIGNIFICANT CHANGE UP (ref 96–108)
CO2 SERPL-SCNC: 30 MMOL/L — SIGNIFICANT CHANGE UP (ref 22–31)
CREAT SERPL-MCNC: 0.6 MG/DL — SIGNIFICANT CHANGE UP (ref 0.5–1.3)
GLUCOSE SERPL-MCNC: 90 MG/DL — SIGNIFICANT CHANGE UP (ref 70–99)
HCT VFR BLD CALC: 40.2 % — SIGNIFICANT CHANGE UP (ref 34.5–45)
HGB BLD-MCNC: 12.6 G/DL — SIGNIFICANT CHANGE UP (ref 11.5–15.5)
INR BLD: 0.97 RATIO — SIGNIFICANT CHANGE UP (ref 0.88–1.16)
MCHC RBC-ENTMCNC: 27.2 PG — SIGNIFICANT CHANGE UP (ref 27–34)
MCHC RBC-ENTMCNC: 31.2 GM/DL — LOW (ref 32–36)
MCV RBC AUTO: 87.1 FL — SIGNIFICANT CHANGE UP (ref 80–100)
PLATELET # BLD AUTO: 147 K/UL — LOW (ref 150–400)
POTASSIUM SERPL-MCNC: 3.7 MMOL/L — SIGNIFICANT CHANGE UP (ref 3.5–5.3)
POTASSIUM SERPL-SCNC: 3.7 MMOL/L — SIGNIFICANT CHANGE UP (ref 3.5–5.3)
PROTHROM AB SERPL-ACNC: 10.6 SEC — SIGNIFICANT CHANGE UP (ref 9.8–12.7)
RBC # BLD: 4.62 M/UL — SIGNIFICANT CHANGE UP (ref 3.8–5.2)
RBC # FLD: 14.5 % — SIGNIFICANT CHANGE UP (ref 10.3–14.5)
SODIUM SERPL-SCNC: 141 MMOL/L — SIGNIFICANT CHANGE UP (ref 135–145)
WBC # BLD: 6.2 K/UL — SIGNIFICANT CHANGE UP (ref 3.8–10.5)
WBC # FLD AUTO: 6.2 K/UL — SIGNIFICANT CHANGE UP (ref 3.8–10.5)

## 2018-04-06 PROCEDURE — 80048 BASIC METABOLIC PNL TOTAL CA: CPT

## 2018-04-06 PROCEDURE — 85610 PROTHROMBIN TIME: CPT

## 2018-04-06 PROCEDURE — G0463: CPT

## 2018-04-06 PROCEDURE — 85027 COMPLETE CBC AUTOMATED: CPT

## 2018-04-06 PROCEDURE — 71046 X-RAY EXAM CHEST 2 VIEWS: CPT | Mod: 26

## 2018-04-06 PROCEDURE — 85730 THROMBOPLASTIN TIME PARTIAL: CPT

## 2018-04-06 PROCEDURE — 71046 X-RAY EXAM CHEST 2 VIEWS: CPT

## 2018-04-06 PROCEDURE — 93005 ELECTROCARDIOGRAM TRACING: CPT

## 2018-04-06 RX ORDER — SODIUM CHLORIDE 9 MG/ML
3 INJECTION INTRAMUSCULAR; INTRAVENOUS; SUBCUTANEOUS EVERY 8 HOURS
Qty: 0 | Refills: 0 | Status: DISCONTINUED | OUTPATIENT
Start: 2018-04-12 | End: 2018-04-20

## 2018-04-06 NOTE — H&P PST ADULT - PMH
Connective tissue disease    GERD (gastroesophageal reflux disease)    Hypertension    Irritable bowel syndrome    Myopathy

## 2018-04-06 NOTE — H&P PST ADULT - NSANTHOSAYNRD_GEN_A_CORE
No. KIERA screening performed.  STOP BANG Legend: 0-2 = LOW Risk; 3-4 = INTERMEDIATE Risk; 5-8 = HIGH Risk

## 2018-04-06 NOTE — H&P PST ADULT - HISTORY OF PRESENT ILLNESS
53year old female with pmhx of hypertension, myopathy, GERD and Connective Tissue Disease presents today with c/o polyps in uterus. Patient is here today for presurgical testing for scheduled dilation and curettage; Hysteroscopy on 4/12/18

## 2018-04-06 NOTE — H&P PST ADULT - FAMILY HISTORY
Father  Still living? Unknown  Family history of hypertension, Age at diagnosis: Age Unknown     Sibling  Still living? Unknown  Family history of hypertension, Age at diagnosis: Age Unknown     Grandparent  Still living? Unknown  Family history of diabetes mellitus, Age at diagnosis: Age Unknown

## 2018-04-11 ENCOUNTER — TRANSCRIPTION ENCOUNTER (OUTPATIENT)
Age: 53
End: 2018-04-11

## 2018-04-12 ENCOUNTER — OUTPATIENT (OUTPATIENT)
Dept: OUTPATIENT SERVICES | Facility: HOSPITAL | Age: 53
LOS: 1 days | End: 2018-04-12
Payer: COMMERCIAL

## 2018-04-12 ENCOUNTER — RESULT REVIEW (OUTPATIENT)
Age: 53
End: 2018-04-12

## 2018-04-12 VITALS
HEART RATE: 61 BPM | RESPIRATION RATE: 14 BRPM | SYSTOLIC BLOOD PRESSURE: 106 MMHG | HEIGHT: 66 IN | DIASTOLIC BLOOD PRESSURE: 62 MMHG | TEMPERATURE: 98 F | OXYGEN SATURATION: 100 % | WEIGHT: 153 LBS

## 2018-04-12 VITALS
TEMPERATURE: 98 F | OXYGEN SATURATION: 100 % | RESPIRATION RATE: 16 BRPM | HEART RATE: 67 BPM | DIASTOLIC BLOOD PRESSURE: 60 MMHG | SYSTOLIC BLOOD PRESSURE: 100 MMHG

## 2018-04-12 DIAGNOSIS — N84.0 POLYP OF CORPUS UTERI: ICD-10-CM

## 2018-04-12 DIAGNOSIS — Z98.51 TUBAL LIGATION STATUS: Chronic | ICD-10-CM

## 2018-04-12 DIAGNOSIS — Z98.890 OTHER SPECIFIED POSTPROCEDURAL STATES: Chronic | ICD-10-CM

## 2018-04-12 DIAGNOSIS — Z01.818 ENCOUNTER FOR OTHER PREPROCEDURAL EXAMINATION: ICD-10-CM

## 2018-04-12 DIAGNOSIS — Z98.89 OTHER SPECIFIED POSTPROCEDURAL STATES: Chronic | ICD-10-CM

## 2018-04-12 PROCEDURE — 58558 HYSTEROSCOPY BIOPSY: CPT

## 2018-04-12 PROCEDURE — 88305 TISSUE EXAM BY PATHOLOGIST: CPT

## 2018-04-12 PROCEDURE — 88305 TISSUE EXAM BY PATHOLOGIST: CPT | Mod: 26

## 2018-04-12 RX ORDER — AMLODIPINE BESYLATE AND BENAZEPRIL HYDROCHLORIDE 10; 20 MG/1; MG/1
1 CAPSULE ORAL
Qty: 0 | Refills: 0 | COMMUNITY

## 2018-04-12 RX ORDER — IBUPROFEN 200 MG
400 TABLET ORAL EVERY 8 HOURS
Qty: 0 | Refills: 0 | Status: DISCONTINUED | OUTPATIENT
Start: 2018-04-12 | End: 2018-04-20

## 2018-04-12 RX ORDER — SODIUM CHLORIDE 9 MG/ML
1000 INJECTION, SOLUTION INTRAVENOUS
Qty: 0 | Refills: 0 | Status: DISCONTINUED | OUTPATIENT
Start: 2018-04-12 | End: 2018-04-12

## 2018-04-12 RX ORDER — IBUPROFEN 200 MG
1 TABLET ORAL
Qty: 12 | Refills: 0 | OUTPATIENT
Start: 2018-04-12 | End: 2018-04-14

## 2018-04-12 RX ORDER — MYCOPHENOLATE MOFETIL 250 MG/1
3 CAPSULE ORAL
Qty: 0 | Refills: 0 | COMMUNITY

## 2018-04-12 RX ORDER — FENTANYL CITRATE 50 UG/ML
25 INJECTION INTRAVENOUS
Qty: 0 | Refills: 0 | Status: DISCONTINUED | OUTPATIENT
Start: 2018-04-12 | End: 2018-04-12

## 2018-04-12 RX ORDER — ACETAMINOPHEN 500 MG
1000 TABLET ORAL ONCE
Qty: 0 | Refills: 0 | Status: DISCONTINUED | OUTPATIENT
Start: 2018-04-12 | End: 2018-04-12

## 2018-04-12 RX ORDER — AMITRIPTYLINE HCL 25 MG
1 TABLET ORAL
Qty: 0 | Refills: 0 | COMMUNITY

## 2018-04-12 RX ORDER — GABAPENTIN 400 MG/1
1 CAPSULE ORAL
Qty: 0 | Refills: 0 | COMMUNITY

## 2018-04-12 RX ADMIN — SODIUM CHLORIDE 3 MILLILITER(S): 9 INJECTION INTRAMUSCULAR; INTRAVENOUS; SUBCUTANEOUS at 08:52

## 2018-04-12 NOTE — ASU DISCHARGE PLAN (ADULT/PEDIATRIC). - MEDICATION SUMMARY - MEDICATIONS TO TAKE
I will START or STAY ON the medications listed below when I get home from the hospital:    Alivio 600 mg oral tablet  -- 1 tab(s) by mouth every 6 hours   -- Do not take this drug if you are pregnant.  It is very important that you take or use this exactly as directed.  Do not skip doses or discontinue unless directed by your doctor.  May cause drowsiness or dizziness.  Obtain medical advice before taking any non-prescription drugs as some may affect the action of this medication.  Take with food or milk.    -- Indication: For Pain as needed

## 2018-04-12 NOTE — ASU DISCHARGE PLAN (ADULT/PEDIATRIC). - ACTIVITY LEVEL
no weight bearing/weight bearing as tolerated/no tampons/nothing per vagina/no tub baths/no heavy lifting/nothing per rectum/no intercourse/no exercise/no sports/gym/no douching no foreign object7/no tub baths/weight bearing as tolerated/nothing per rectum/no tampons/nothing per vagina/no intercourse/no exercise/no sports/gym/no weight bearing/no heavy lifting/no douching

## 2018-04-12 NOTE — ASU DISCHARGE PLAN (ADULT/PEDIATRIC). - NOTIFY
Persistent Nausea and Vomiting/GYN Fever>100.4/Inability to Tolerate Liquids or Foods/Bleeding that does not stop/Unable to Urinate

## 2018-04-16 LAB — SURGICAL PATHOLOGY FINAL REPORT - CH: SIGNIFICANT CHANGE UP

## 2018-04-26 ENCOUNTER — RX RENEWAL (OUTPATIENT)
Age: 53
End: 2018-04-26

## 2018-05-24 ENCOUNTER — APPOINTMENT (OUTPATIENT)
Dept: RHEUMATOLOGY | Facility: CLINIC | Age: 53
End: 2018-05-24
Payer: COMMERCIAL

## 2018-05-24 ENCOUNTER — EMERGENCY (EMERGENCY)
Facility: HOSPITAL | Age: 53
LOS: 1 days | Discharge: HOME | End: 2018-05-24
Attending: EMERGENCY MEDICINE
Payer: COMMERCIAL

## 2018-05-24 VITALS
SYSTOLIC BLOOD PRESSURE: 124 MMHG | WEIGHT: 151 LBS | BODY MASS INDEX: 24.27 KG/M2 | TEMPERATURE: 98 F | HEART RATE: 70 BPM | HEIGHT: 66 IN | RESPIRATION RATE: 14 BRPM | OXYGEN SATURATION: 98 % | DIASTOLIC BLOOD PRESSURE: 83 MMHG

## 2018-05-24 VITALS
TEMPERATURE: 98 F | WEIGHT: 151.02 LBS | OXYGEN SATURATION: 100 % | DIASTOLIC BLOOD PRESSURE: 89 MMHG | RESPIRATION RATE: 16 BRPM | HEIGHT: 66 IN | HEART RATE: 65 BPM | SYSTOLIC BLOOD PRESSURE: 152 MMHG

## 2018-05-24 DIAGNOSIS — Z98.89 OTHER SPECIFIED POSTPROCEDURAL STATES: Chronic | ICD-10-CM

## 2018-05-24 DIAGNOSIS — Z98.51 TUBAL LIGATION STATUS: Chronic | ICD-10-CM

## 2018-05-24 DIAGNOSIS — Z98.890 OTHER SPECIFIED POSTPROCEDURAL STATES: Chronic | ICD-10-CM

## 2018-05-24 LAB
ALBUMIN SERPL ELPH-MCNC: 4.7 G/DL
ALP BLD-CCNC: 70 U/L
ALT SERPL-CCNC: 22 U/L
ANION GAP SERPL CALC-SCNC: 20 MMOL/L
APPEARANCE: CLEAR
AST SERPL-CCNC: 24 U/L
BACTERIA: NEGATIVE
BASE EXCESS BLDV CALC-SCNC: 4.2 MMOL/L — HIGH (ref -2–2)
BASOPHILS # BLD AUTO: 0.02 K/UL
BASOPHILS NFR BLD AUTO: 0.3 %
BILIRUB SERPL-MCNC: 0.8 MG/DL
BILIRUBIN URINE: NEGATIVE
BLOOD URINE: NEGATIVE
BUN SERPL-MCNC: 12 MG/DL
CA-I SERPL-SCNC: 1.19 MMOL/L — SIGNIFICANT CHANGE UP (ref 1.12–1.3)
CALCIUM SERPL-MCNC: 9.9 MG/DL
CHLORIDE BLDV-SCNC: 106 MMOL/L — SIGNIFICANT CHANGE UP (ref 96–108)
CHLORIDE SERPL-SCNC: 101 MMOL/L
CK SERPL-CCNC: 474 U/L
CO2 BLDV-SCNC: 32 MMOL/L — HIGH (ref 22–30)
CO2 SERPL-SCNC: 21 MMOL/L
COLOR: YELLOW
CREAT SERPL-MCNC: 0.7 MG/DL
CREAT SPEC-SCNC: 10 MG/DL
CREAT/PROT UR: NORMAL
DEPRECATED D DIMER PPP IA-ACNC: 290 NG/ML DDU
EOSINOPHIL # BLD AUTO: 0.07 K/UL
EOSINOPHIL NFR BLD AUTO: 1.1 %
ERYTHROCYTE [SEDIMENTATION RATE] IN BLOOD BY WESTERGREN METHOD: 23 MM/HR
GAS PNL BLDV: 139 MMOL/L — SIGNIFICANT CHANGE UP (ref 136–145)
GAS PNL BLDV: SIGNIFICANT CHANGE UP
GAS PNL BLDV: SIGNIFICANT CHANGE UP
GLUCOSE BLDV-MCNC: 111 MG/DL — HIGH (ref 70–99)
GLUCOSE QUALITATIVE U: NEGATIVE MG/DL
GLUCOSE SERPL-MCNC: 92 MG/DL
HCO3 BLDV-SCNC: 30 MMOL/L — HIGH (ref 21–29)
HCT VFR BLD CALC: 39.2 % — SIGNIFICANT CHANGE UP (ref 34.5–45)
HCT VFR BLD CALC: 40.1 %
HCT VFR BLDA CALC: 40 % — SIGNIFICANT CHANGE UP (ref 39–50)
HGB BLD CALC-MCNC: 13 G/DL — SIGNIFICANT CHANGE UP (ref 11.5–15.5)
HGB BLD-MCNC: 13.1 G/DL
HGB BLD-MCNC: 13.1 G/DL — SIGNIFICANT CHANGE UP (ref 11.5–15.5)
HYALINE CASTS: 1 /LPF
IMM GRANULOCYTES NFR BLD AUTO: 0.2 %
KETONES URINE: NEGATIVE
LACTATE BLDV-MCNC: 0.7 MMOL/L — SIGNIFICANT CHANGE UP (ref 0.7–2)
LEUKOCYTE ESTERASE URINE: NEGATIVE
LYMPHOCYTES # BLD AUTO: 2.16 K/UL
LYMPHOCYTES NFR BLD AUTO: 33 %
MAN DIFF?: NORMAL
MCHC RBC-ENTMCNC: 27.5 PG
MCHC RBC-ENTMCNC: 29.1 PG — SIGNIFICANT CHANGE UP (ref 27–34)
MCHC RBC-ENTMCNC: 32.7 GM/DL
MCHC RBC-ENTMCNC: 33.5 GM/DL — SIGNIFICANT CHANGE UP (ref 32–36)
MCV RBC AUTO: 84.2 FL
MCV RBC AUTO: 86.8 FL — SIGNIFICANT CHANGE UP (ref 80–100)
MICROSCOPIC-UA: NORMAL
MONOCYTES # BLD AUTO: 0.29 K/UL
MONOCYTES NFR BLD AUTO: 4.4 %
NEUTROPHILS # BLD AUTO: 3.99 K/UL
NEUTROPHILS NFR BLD AUTO: 61 %
NITRITE URINE: NEGATIVE
OTHER CELLS CSF MANUAL: 5 ML/DL — LOW (ref 18–22)
PCO2 BLDV: 52 MMHG — HIGH (ref 35–50)
PH BLDV: 7.38 — SIGNIFICANT CHANGE UP (ref 7.35–7.45)
PH URINE: 6
PLATELET # BLD AUTO: 177 K/UL
PO2 BLDV: 20 MMHG — LOW (ref 25–45)
POTASSIUM BLDV-SCNC: 3.4 MMOL/L — LOW (ref 3.5–5)
POTASSIUM SERPL-SCNC: 4.1 MMOL/L
PROT SERPL-MCNC: 7.8 G/DL
PROT UR-MCNC: <4 MG/DL
PROTEIN URINE: NEGATIVE MG/DL
RBC # BLD: 4.52 M/UL — SIGNIFICANT CHANGE UP (ref 3.8–5.2)
RBC # BLD: 4.76 M/UL
RBC # FLD: 13.3 % — SIGNIFICANT CHANGE UP (ref 10.3–14.5)
RBC # FLD: 15.3 %
RED BLOOD CELLS URINE: 2 /HPF
SAO2 % BLDV: 29 % — LOW (ref 67–88)
SODIUM SERPL-SCNC: 142 MMOL/L
SPECIFIC GRAVITY URINE: 1
SQUAMOUS EPITHELIAL CELLS: 1 /HPF
UROBILINOGEN URINE: NEGATIVE MG/DL
WBC # BLD: 10.2 K/UL — SIGNIFICANT CHANGE UP (ref 3.8–10.5)
WBC # FLD AUTO: 10.2 K/UL — SIGNIFICANT CHANGE UP (ref 3.8–10.5)
WBC # FLD AUTO: 6.54 K/UL
WHITE BLOOD CELLS URINE: 2 /HPF

## 2018-05-24 PROCEDURE — 99215 OFFICE O/P EST HI 40 MIN: CPT

## 2018-05-24 PROCEDURE — 93010 ELECTROCARDIOGRAM REPORT: CPT

## 2018-05-24 PROCEDURE — 99284 EMERGENCY DEPT VISIT MOD MDM: CPT | Mod: 25

## 2018-05-24 RX ORDER — SODIUM CHLORIDE 9 MG/ML
3 INJECTION INTRAMUSCULAR; INTRAVENOUS; SUBCUTANEOUS ONCE
Qty: 0 | Refills: 0 | Status: COMPLETED | OUTPATIENT
Start: 2018-05-24 | End: 2018-05-24

## 2018-05-24 RX ADMIN — SODIUM CHLORIDE 3 MILLILITER(S): 9 INJECTION INTRAMUSCULAR; INTRAVENOUS; SUBCUTANEOUS at 23:41

## 2018-05-24 NOTE — ED ADULT NURSE NOTE - CHPI ED SYMPTOMS NEG
no wheezing/no headache/no edema/no diaphoresis/no chills/no fever/no cough/no hemoptysis/no body aches

## 2018-05-24 NOTE — ED ADULT NURSE NOTE - OBJECTIVE STATEMENT
pt states "sent by Rheumatologist for elevated d-dimer. I had a f/u appointment with her yesterday. Have had SOB and lightheadedness on and off x past month. No relation of SOB with specific activities/events, but does get some tightness/burning at times with episodes"

## 2018-05-24 NOTE — ED ADULT TRIAGE NOTE - CHIEF COMPLAINT QUOTE
elevated d dimer on blood work drawn this morning d/t sob, dizziness and lightheadedness x 1 month  pt appears comfortable and talking in complete sentences

## 2018-05-24 NOTE — ED PROVIDER NOTE - MEDICAL DECISION MAKING DETAILS
Elevated d-dimer outpt in setting one month of exertional dyspnea. No CP today. Will obtain CT chest r/o PE. No LE pain or swelling. Check EKG. JOHN.

## 2018-05-24 NOTE — ED PROVIDER NOTE - OBJECTIVE STATEMENT
53F PMH of connective tissue d/o and HTN sent by PCP for elevated D-dimer. Pt was being evaluated for one month intermittent dyspnea with exertion. No orthopnea. No pleuritic CP. Mild intermittent chest pressure, none today. No cough. PMH of tobacco use. No h/o CAD. No LE pain or swelling. No h/o DVT or PE. No OCP use.

## 2018-05-25 VITALS
TEMPERATURE: 98 F | DIASTOLIC BLOOD PRESSURE: 89 MMHG | SYSTOLIC BLOOD PRESSURE: 122 MMHG | HEART RATE: 69 BPM | RESPIRATION RATE: 16 BRPM | OXYGEN SATURATION: 100 %

## 2018-05-25 LAB
ALBUMIN SERPL ELPH-MCNC: 4.4 G/DL — SIGNIFICANT CHANGE UP (ref 3.3–5)
ALP SERPL-CCNC: 73 U/L — SIGNIFICANT CHANGE UP (ref 40–120)
ALT FLD-CCNC: 18 U/L — SIGNIFICANT CHANGE UP (ref 10–45)
ANION GAP SERPL CALC-SCNC: 13 MMOL/L — SIGNIFICANT CHANGE UP (ref 5–17)
APTT BLD: 35.6 SEC — SIGNIFICANT CHANGE UP (ref 27.5–37.4)
AST SERPL-CCNC: 19 U/L — SIGNIFICANT CHANGE UP (ref 10–40)
BASOPHILS # BLD AUTO: 0.1 K/UL — SIGNIFICANT CHANGE UP (ref 0–0.2)
BILIRUB SERPL-MCNC: 0.5 MG/DL — SIGNIFICANT CHANGE UP (ref 0.2–1.2)
BUN SERPL-MCNC: 16 MG/DL — SIGNIFICANT CHANGE UP (ref 7–23)
CALCIUM SERPL-MCNC: 9.7 MG/DL — SIGNIFICANT CHANGE UP (ref 8.4–10.5)
CHLORIDE SERPL-SCNC: 100 MMOL/L — SIGNIFICANT CHANGE UP (ref 96–108)
CO2 SERPL-SCNC: 28 MMOL/L — SIGNIFICANT CHANGE UP (ref 22–31)
CREAT SERPL-MCNC: 0.73 MG/DL — SIGNIFICANT CHANGE UP (ref 0.5–1.3)
D DIMER BLD IA.RAPID-MCNC: 214 NG/ML DDU — SIGNIFICANT CHANGE UP
EOSINOPHIL # BLD AUTO: 0.1 K/UL — SIGNIFICANT CHANGE UP (ref 0–0.5)
EOSINOPHIL NFR BLD AUTO: 1 % — SIGNIFICANT CHANGE UP (ref 0–6)
GLUCOSE SERPL-MCNC: 101 MG/DL — HIGH (ref 70–99)
INR BLD: 0.94 RATIO — SIGNIFICANT CHANGE UP (ref 0.88–1.16)
LYMPHOCYTES # BLD AUTO: 4.3 K/UL — HIGH (ref 1–3.3)
LYMPHOCYTES # BLD AUTO: 45 % — HIGH (ref 13–44)
MONOCYTES # BLD AUTO: 0.6 K/UL — SIGNIFICANT CHANGE UP (ref 0–0.9)
MONOCYTES NFR BLD AUTO: 5 % — SIGNIFICANT CHANGE UP (ref 2–14)
NEUTROPHILS # BLD AUTO: 5.1 K/UL — SIGNIFICANT CHANGE UP (ref 1.8–7.4)
NEUTROPHILS NFR BLD AUTO: 49 % — SIGNIFICANT CHANGE UP (ref 43–77)
PLAT MORPH BLD: ABNORMAL
PLATELET # BLD AUTO: ABNORMAL (ref 150–400)
POTASSIUM SERPL-MCNC: 3.7 MMOL/L — SIGNIFICANT CHANGE UP (ref 3.5–5.3)
POTASSIUM SERPL-SCNC: 3.7 MMOL/L — SIGNIFICANT CHANGE UP (ref 3.5–5.3)
PROT SERPL-MCNC: 7.6 G/DL — SIGNIFICANT CHANGE UP (ref 6–8.3)
PROTHROM AB SERPL-ACNC: 10.2 SEC — SIGNIFICANT CHANGE UP (ref 9.8–12.7)
RBC BLD AUTO: SIGNIFICANT CHANGE UP
SODIUM SERPL-SCNC: 141 MMOL/L — SIGNIFICANT CHANGE UP (ref 135–145)
TROPONIN T SERPL-MCNC: <0.01 NG/ML — SIGNIFICANT CHANGE UP (ref 0–0.06)

## 2018-05-25 PROCEDURE — 71275 CT ANGIOGRAPHY CHEST: CPT

## 2018-05-25 PROCEDURE — 99284 EMERGENCY DEPT VISIT MOD MDM: CPT | Mod: 25

## 2018-05-25 PROCEDURE — 93005 ELECTROCARDIOGRAM TRACING: CPT

## 2018-05-25 PROCEDURE — 82565 ASSAY OF CREATININE: CPT

## 2018-05-25 PROCEDURE — 84295 ASSAY OF SERUM SODIUM: CPT

## 2018-05-25 PROCEDURE — 82947 ASSAY GLUCOSE BLOOD QUANT: CPT

## 2018-05-25 PROCEDURE — 71275 CT ANGIOGRAPHY CHEST: CPT | Mod: 26

## 2018-05-25 PROCEDURE — 85610 PROTHROMBIN TIME: CPT

## 2018-05-25 PROCEDURE — 80053 COMPREHEN METABOLIC PANEL: CPT

## 2018-05-25 PROCEDURE — 84132 ASSAY OF SERUM POTASSIUM: CPT

## 2018-05-25 PROCEDURE — 85027 COMPLETE CBC AUTOMATED: CPT

## 2018-05-25 PROCEDURE — 82330 ASSAY OF CALCIUM: CPT

## 2018-05-25 PROCEDURE — 85014 HEMATOCRIT: CPT

## 2018-05-25 PROCEDURE — 82803 BLOOD GASES ANY COMBINATION: CPT

## 2018-05-25 PROCEDURE — 82435 ASSAY OF BLOOD CHLORIDE: CPT

## 2018-05-25 PROCEDURE — 85379 FIBRIN DEGRADATION QUANT: CPT

## 2018-05-25 PROCEDURE — 84484 ASSAY OF TROPONIN QUANT: CPT

## 2018-05-25 PROCEDURE — 83605 ASSAY OF LACTIC ACID: CPT

## 2018-05-25 PROCEDURE — 85730 THROMBOPLASTIN TIME PARTIAL: CPT

## 2018-06-29 ENCOUNTER — APPOINTMENT (OUTPATIENT)
Dept: RHEUMATOLOGY | Facility: CLINIC | Age: 53
End: 2018-06-29
Payer: COMMERCIAL

## 2018-06-29 VITALS
TEMPERATURE: 97.6 F | OXYGEN SATURATION: 97 % | BODY MASS INDEX: 23.95 KG/M2 | HEIGHT: 66 IN | SYSTOLIC BLOOD PRESSURE: 132 MMHG | WEIGHT: 149 LBS | HEART RATE: 60 BPM | DIASTOLIC BLOOD PRESSURE: 85 MMHG

## 2018-06-29 VITALS — OXYGEN SATURATION: 98 %

## 2018-06-29 PROCEDURE — 99215 OFFICE O/P EST HI 40 MIN: CPT

## 2018-06-29 RX ORDER — TRAMADOL HYDROCHLORIDE 50 MG/1
50 TABLET, COATED ORAL
Qty: 120 | Refills: 0 | Status: DISCONTINUED | COMMUNITY
Start: 2018-03-27 | End: 2018-06-29

## 2018-07-02 ENCOUNTER — APPOINTMENT (OUTPATIENT)
Dept: CARDIOLOGY | Facility: CLINIC | Age: 53
End: 2018-07-02
Payer: COMMERCIAL

## 2018-07-02 ENCOUNTER — APPOINTMENT (OUTPATIENT)
Dept: PULMONOLOGY | Facility: CLINIC | Age: 53
End: 2018-07-02
Payer: COMMERCIAL

## 2018-07-02 ENCOUNTER — NON-APPOINTMENT (OUTPATIENT)
Age: 53
End: 2018-07-02

## 2018-07-02 VITALS — BODY MASS INDEX: 24.85 KG/M2 | WEIGHT: 151 LBS | HEIGHT: 65.5 IN

## 2018-07-02 VITALS
HEART RATE: 86 BPM | DIASTOLIC BLOOD PRESSURE: 79 MMHG | OXYGEN SATURATION: 98 % | SYSTOLIC BLOOD PRESSURE: 117 MMHG | RESPIRATION RATE: 15 BRPM

## 2018-07-02 VITALS
WEIGHT: 151 LBS | OXYGEN SATURATION: 98 % | BODY MASS INDEX: 25.16 KG/M2 | HEIGHT: 65 IN | TEMPERATURE: 96.3 F | RESPIRATION RATE: 15 BRPM | DIASTOLIC BLOOD PRESSURE: 60 MMHG | SYSTOLIC BLOOD PRESSURE: 118 MMHG | HEART RATE: 59 BPM

## 2018-07-02 LAB
ALBUMIN SERPL ELPH-MCNC: 4.5 G/DL
ALP BLD-CCNC: 65 U/L
ALT SERPL-CCNC: 14 U/L
ANION GAP SERPL CALC-SCNC: 15 MMOL/L
APPEARANCE: CLEAR
AST SERPL-CCNC: 19 U/L
BACTERIA: NEGATIVE
BASOPHILS # BLD AUTO: 0.01 K/UL
BASOPHILS NFR BLD AUTO: 0.2 %
BILIRUB SERPL-MCNC: 0.4 MG/DL
BILIRUBIN URINE: NEGATIVE
BLOOD URINE: NEGATIVE
BUN SERPL-MCNC: 16 MG/DL
C3 SERPL-MCNC: 131 MG/DL
C4 SERPL-MCNC: 47 MG/DL
CALCIUM SERPL-MCNC: 9.8 MG/DL
CHLORIDE SERPL-SCNC: 104 MMOL/L
CK SERPL-CCNC: 380 U/L
CO2 SERPL-SCNC: 25 MMOL/L
COLOR: YELLOW
CREAT SERPL-MCNC: 0.77 MG/DL
CREAT SPEC-SCNC: 59 MG/DL
CREAT/PROT UR: 0.1 RATIO
EOSINOPHIL # BLD AUTO: 0.07 K/UL
EOSINOPHIL NFR BLD AUTO: 1.2 %
GLUCOSE QUALITATIVE U: NEGATIVE MG/DL
GLUCOSE SERPL-MCNC: 90 MG/DL
HCT VFR BLD CALC: 39.9 %
HGB BLD-MCNC: 13 G/DL
HYALINE CASTS: 2 /LPF
IMM GRANULOCYTES NFR BLD AUTO: 0.2 %
KETONES URINE: NEGATIVE
LEUKOCYTE ESTERASE URINE: NEGATIVE
LYMPHOCYTES # BLD AUTO: 2.05 K/UL
LYMPHOCYTES NFR BLD AUTO: 34.2 %
MAN DIFF?: NORMAL
MCHC RBC-ENTMCNC: 27.7 PG
MCHC RBC-ENTMCNC: 32.6 GM/DL
MCV RBC AUTO: 84.9 FL
MICROSCOPIC-UA: NORMAL
MONOCYTES # BLD AUTO: 0.26 K/UL
MONOCYTES NFR BLD AUTO: 4.3 %
NEUTROPHILS # BLD AUTO: 3.6 K/UL
NEUTROPHILS NFR BLD AUTO: 59.9 %
NITRITE URINE: NEGATIVE
PH URINE: 6
PLATELET # BLD AUTO: 134 K/UL
POTASSIUM SERPL-SCNC: 4.1 MMOL/L
PROT SERPL-MCNC: 7.4 G/DL
PROT UR-MCNC: 5 MG/DL
PROTEIN URINE: NEGATIVE MG/DL
RBC # BLD: 4.7 M/UL
RBC # FLD: 15.2 %
RED BLOOD CELLS URINE: 2 /HPF
SODIUM SERPL-SCNC: 144 MMOL/L
SPECIFIC GRAVITY URINE: 1.01
SQUAMOUS EPITHELIAL CELLS: 2 /HPF
UROBILINOGEN URINE: NEGATIVE MG/DL
WBC # FLD AUTO: 6 K/UL
WHITE BLOOD CELLS URINE: 2 /HPF

## 2018-07-02 PROCEDURE — 93000 ELECTROCARDIOGRAM COMPLETE: CPT

## 2018-07-02 PROCEDURE — 99205 OFFICE O/P NEW HI 60 MIN: CPT | Mod: 25

## 2018-07-02 PROCEDURE — ZZZZZ: CPT

## 2018-07-02 PROCEDURE — 94729 DIFFUSING CAPACITY: CPT

## 2018-07-02 PROCEDURE — 94060 EVALUATION OF WHEEZING: CPT

## 2018-07-02 PROCEDURE — 94726 PLETHYSMOGRAPHY LUNG VOLUMES: CPT

## 2018-07-02 PROCEDURE — 99205 OFFICE O/P NEW HI 60 MIN: CPT

## 2018-07-10 ENCOUNTER — OUTPATIENT (OUTPATIENT)
Dept: OUTPATIENT SERVICES | Facility: HOSPITAL | Age: 53
LOS: 1 days | End: 2018-07-10
Payer: COMMERCIAL

## 2018-07-10 ENCOUNTER — APPOINTMENT (OUTPATIENT)
Dept: CV DIAGNOSITCS | Facility: HOSPITAL | Age: 53
End: 2018-07-10

## 2018-07-10 DIAGNOSIS — Z98.51 TUBAL LIGATION STATUS: Chronic | ICD-10-CM

## 2018-07-10 DIAGNOSIS — R06.09 OTHER FORMS OF DYSPNEA: ICD-10-CM

## 2018-07-10 DIAGNOSIS — Z98.890 OTHER SPECIFIED POSTPROCEDURAL STATES: Chronic | ICD-10-CM

## 2018-07-10 DIAGNOSIS — Z98.89 OTHER SPECIFIED POSTPROCEDURAL STATES: Chronic | ICD-10-CM

## 2018-07-10 PROCEDURE — 93306 TTE W/DOPPLER COMPLETE: CPT | Mod: 26

## 2018-08-02 ENCOUNTER — APPOINTMENT (OUTPATIENT)
Dept: PULMONOLOGY | Facility: CLINIC | Age: 53
End: 2018-08-02

## 2018-08-20 ENCOUNTER — APPOINTMENT (OUTPATIENT)
Dept: INTERNAL MEDICINE | Facility: CLINIC | Age: 53
End: 2018-08-20
Payer: COMMERCIAL

## 2018-08-20 VITALS
WEIGHT: 150 LBS | DIASTOLIC BLOOD PRESSURE: 76 MMHG | OXYGEN SATURATION: 98 % | HEIGHT: 65 IN | BODY MASS INDEX: 24.99 KG/M2 | RESPIRATION RATE: 14 BRPM | SYSTOLIC BLOOD PRESSURE: 124 MMHG | TEMPERATURE: 98.6 F | HEART RATE: 77 BPM

## 2018-08-20 DIAGNOSIS — Z82.5 FAMILY HISTORY OF ASTHMA AND OTHER CHRONIC LOWER RESPIRATORY DISEASES: ICD-10-CM

## 2018-08-20 DIAGNOSIS — Z82.49 FAMILY HISTORY OF ISCHEMIC HEART DISEASE AND OTHER DISEASES OF THE CIRCULATORY SYSTEM: ICD-10-CM

## 2018-08-20 PROCEDURE — 90715 TDAP VACCINE 7 YRS/> IM: CPT

## 2018-08-20 PROCEDURE — 90471 IMMUNIZATION ADMIN: CPT

## 2018-08-20 PROCEDURE — 99386 PREV VISIT NEW AGE 40-64: CPT | Mod: 25

## 2018-08-20 RX ORDER — PSYLLIUM SEED
PACKET (EA) ORAL
Refills: 0 | Status: ACTIVE | COMMUNITY

## 2018-08-20 NOTE — HEALTH RISK ASSESSMENT
[Patient reported PAP Smear was normal] : Patient reported PAP Smear was normal [HIV test declined] : HIV test declined [Hepatitis C test offered] : Hepatitis C test offered [0] : 2) Feeling down, depressed, or hopeless: Not at all (0) [Patient reported colonoscopy was normal] : Patient reported colonoscopy was normal [MammogramDate] : 6/18 [PapSmearDate] : 6/18 [ColonoscopyDate] : 4/18 [ColonoscopyComments] : told nl and repeat in 5 yrs per pt

## 2018-08-20 NOTE — HISTORY OF PRESENT ILLNESS
[Health Maintenance] : health maintenance [Fair] : fair [Reg. Dental Visits] : She has regular dental visits [Postmenopausal] : the patient is postmenopausal [___ Year(s) Ago] : [unfilled] year(s) ago [FreeTextEntry1] : Wanted new PMD.  Last seen 11/17.   [Vision Problems] : She denies vision problems [Hearing Loss] : She denies hearing loss [de-identified] : hx flu shot 10/17, hx Tdap > 10 yrs [de-identified] : \par 52 yo F pmhx HTN, HLD, preDM, ARAGON, MCTD, necrotizing myopathy, vit d insuff, B12 def here for CPE\par \par Feeling well\par Not fasting- wants slip\par \par \par hx MCTD, necrotizige myopathy- gets gen arms/legs muscles and joints, controlled\par -followed by rheum\par -on elavil qhs for sleep\par -on diclofenac - taking bid prn\par -on neurontin 300mg tid\par -on cellcept since 10/17\par \par GERD, IBS- constipation-\par -on omeprazole prn\par -reports nl appetite and BMs- takes metamucil prn.  Denies BRBPR or melena.\par -hx c-scope Dr. Hammond in 4/18- told nl, advised repeat in 5 yrs.\par \par HTN-\par -on amlodipine- benazepril, taking current dose > 1 yr\par -on HCTZ 12.5 mg > 1 yr, taking 1od x4d as doesn’t like increased urination with use\par -hx optho eval 7/18 per pt, told mild changes due to "autoimmune d/o"- has f/u 9/15/18.  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  Advised pulm eval\par -see by pulm 7/18, awaiting cardiopulm testing\par -denies cough, fever, chills, dizziness, CP, leg edema or palpitations.\par \par hx abnl mammo/sono 6/18- advised 6 mo repeat in both, ordered by GYN\par \par Denies  complaints. Denies hx STD dx.

## 2018-08-20 NOTE — REVIEW OF SYSTEMS
[Negative] : Psychiatric [Fever] : no fever [Chills] : no chills [Dysuria] : no dysuria [Incontinence] : no incontinence [Hematuria] : no hematuria [Dizziness] : no dizziness [FreeTextEntry5] : see HPI [FreeTextEntry6] : see HPI [FreeTextEntry7] : see HPI [FreeTextEntry8] : see HPI [FreeTextEntry9] : see HPI

## 2018-08-20 NOTE — PAST MEDICAL HISTORY
[Postmenopausal] : postmenopausal [Definite ___ (Date)] : the last menstrual period was [unfilled] [Total Preg ___] : G[unfilled] [Living ___] : Living: [unfilled] [AB Induced ___] : elective abortions: [unfilled]

## 2018-08-20 NOTE — PHYSICAL EXAM
[No Acute Distress] : no acute distress [Well-Appearing] : well-appearing [Normal Sclera/Conjunctiva] : normal sclera/conjunctiva [PERRL] : pupils equal round and reactive to light [EOMI] : extraocular movements intact [Normal Outer Ear/Nose] : the outer ears and nose were normal in appearance [Normal Oropharynx] : the oropharynx was normal [Normal TMs] : both tympanic membranes were normal [Normal Nasal Mucosa] : the nasal mucosa was normal [Supple] : supple [No Lymphadenopathy] : no lymphadenopathy [Thyroid Normal, No Nodules] : the thyroid was normal and there were no nodules present [No Respiratory Distress] : no respiratory distress  [Clear to Auscultation] : lungs were clear to auscultation bilaterally [Normal Rate] : normal rate  [Regular Rhythm] : with a regular rhythm [Normal S1, S2] : normal S1 and S2 [No Murmur] : no murmur heard [Pedal Pulses Present] : the pedal pulses are present [No Edema] : there was no peripheral edema [Soft] : abdomen soft [Non Tender] : non-tender [No HSM] : no HSM [Normal Supraclavicular Nodes] : no supraclavicular lymphadenopathy [Normal Posterior Cervical Nodes] : no posterior cervical lymphadenopathy [Normal Anterior Cervical Nodes] : no anterior cervical lymphadenopathy [No CVA Tenderness] : no CVA  tenderness [No Spinal Tenderness] : no spinal tenderness [No Joint Swelling] : no joint swelling [Grossly Normal Strength/Tone] : grossly normal strength/tone [No Rash] : no rash [No Focal Deficits] : no focal deficits [Deep Tendon Reflexes (DTR)] : deep tendon reflexes were 2+ and symmetric [Normal Affect] : the affect was normal [Alert and Oriented x3] : oriented to person, place, and time [de-identified] : scattered freckles

## 2018-08-20 NOTE — ASSESSMENT
[FreeTextEntry1] : \par 52 yo F pmhx HTN, HLD, preDM, ARAGON, MCTD, necrotizing myopathy, vit d insuff, B12 def here for CPE\par \par \par hx MCTD, necrotizing myopathy (hx muscle bx)- gets gen arms/legs muscles and joints, controlled\par -followed by rheum, has f/u 8/18\par -on elavil qhs for sleep, diclofenac - taking bid prn, neurontin 300mg tid\par -on cellcept since 10/17, 6/18 plt 134- check repeat\par \par GERD, IBS- constipation-\par -on omeprazole prn\par -on metamucil prn\par -hx c-scope Dr. Hammond in 4/18- told nl, advised repeat in 5 yrs.\par \par HTN-\par -on amlodipine- benazepril\par -on HCTZ 12.5 mg, adherence counseled\par -6/18 UA no prt, cmp wnl\par -7/18 echo: EF 69%, mild MR, borderline pulmHTN, nl LV fxn and size, mild diastolic dysfxn\par -hx optho eval 7/18 per pt, told mild changes due to "autoimmune d/o"- has f/u 9/15/18.  Denies eye pain or vision trouble\par -low salt diet advised\par \par hx ARAGON- feels is currently not active\par -seen by cardio 7/18, had echo.  Advised pulm eval\par -see by pulm 7/18, awaiting cardiopulm testing\par \par preDM- 12/17 A1c 5.7\par -ADA diet, exercise and wt loss advised\par -check A1c\par \par HLD- 12/17 Tchol 258 TG 61  HDL 82\par -low fat diet, exercise and wt loss advised\par -check lipids\par \par hx abnl mammo/sono 6/18- advised 6 mo repeat in both, has Rx's ordered by GYN\par \par \par HCM\par -check screening labs; declines STD/HIV screening, willing for hep C screening\par -hx flu shot 10/17\par -Tdap today\par -hx negative PAP 6/18 by GYN \par -hx screening colonoscopy\par -derm referral for screening.  Regular use of sun block for skin cancer prevention advised.\par \par \par Pt's cell: [phone]

## 2018-08-21 PROBLEM — G72.9 MYOPATHY, UNSPECIFIED: Chronic | Status: ACTIVE | Noted: 2018-04-06

## 2018-08-21 PROBLEM — M35.9 SYSTEMIC INVOLVEMENT OF CONNECTIVE TISSUE, UNSPECIFIED: Chronic | Status: ACTIVE | Noted: 2018-04-06

## 2018-08-26 LAB
25(OH)D3 SERPL-MCNC: 65.1 NG/ML
ALBUMIN SERPL ELPH-MCNC: 4.3 G/DL
ALP BLD-CCNC: 61 U/L
ALT SERPL-CCNC: 13 U/L
ANION GAP SERPL CALC-SCNC: 12 MMOL/L
AST SERPL-CCNC: 20 U/L
BASOPHILS # BLD AUTO: 0.02 K/UL
BASOPHILS NFR BLD AUTO: 0.4 %
BILIRUB SERPL-MCNC: 0.5 MG/DL
BUN SERPL-MCNC: 14 MG/DL
CALCIUM SERPL-MCNC: 9.5 MG/DL
CHLORIDE SERPL-SCNC: 104 MMOL/L
CHOLEST SERPL-MCNC: 213 MG/DL
CHOLEST/HDLC SERPL: 3 RATIO
CO2 SERPL-SCNC: 26 MMOL/L
CREAT SERPL-MCNC: 0.58 MG/DL
EOSINOPHIL # BLD AUTO: 0.19 K/UL
EOSINOPHIL NFR BLD AUTO: 3.5 %
FOLATE SERPL-MCNC: >20 NG/ML
GLUCOSE SERPL-MCNC: 93 MG/DL
HBA1C MFR BLD HPLC: 5.8 %
HBV CORE IGG+IGM SER QL: NONREACTIVE
HBV SURFACE AB SER QL: REACTIVE
HBV SURFACE AG SER QL: NONREACTIVE
HCT VFR BLD CALC: 39.5 %
HCV AB SER QL: NONREACTIVE
HCV S/CO RATIO: 0.2 S/CO
HDLC SERPL-MCNC: 70 MG/DL
HGB BLD-MCNC: 12.3 G/DL
IMM GRANULOCYTES NFR BLD AUTO: 0 %
LDLC SERPL CALC-MCNC: 132 MG/DL
LYMPHOCYTES # BLD AUTO: 2 K/UL
LYMPHOCYTES NFR BLD AUTO: 37.2 %
MAN DIFF?: NORMAL
MCHC RBC-ENTMCNC: 27.3 PG
MCHC RBC-ENTMCNC: 31.1 GM/DL
MCV RBC AUTO: 87.6 FL
MONOCYTES # BLD AUTO: 0.27 K/UL
MONOCYTES NFR BLD AUTO: 5 %
NEUTROPHILS # BLD AUTO: 2.89 K/UL
NEUTROPHILS NFR BLD AUTO: 53.9 %
PLATELET # BLD AUTO: 117 K/UL
POTASSIUM SERPL-SCNC: 4 MMOL/L
PROT SERPL-MCNC: 7.1 G/DL
RBC # BLD: 4.51 M/UL
RBC # FLD: 16.3 %
SODIUM SERPL-SCNC: 142 MMOL/L
T PALLIDUM AB SER QL IA: NEGATIVE
TRIGL SERPL-MCNC: 55 MG/DL
TSH SERPL-ACNC: 3.06 UIU/ML
VIT B12 SERPL-MCNC: 1330 PG/ML
WBC # FLD AUTO: 5.37 K/UL

## 2018-08-29 ENCOUNTER — RX RENEWAL (OUTPATIENT)
Age: 53
End: 2018-08-29

## 2018-08-30 ENCOUNTER — APPOINTMENT (OUTPATIENT)
Dept: RHEUMATOLOGY | Facility: CLINIC | Age: 53
End: 2018-08-30
Payer: COMMERCIAL

## 2018-08-30 ENCOUNTER — OTHER (OUTPATIENT)
Age: 53
End: 2018-08-30

## 2018-08-30 VITALS
TEMPERATURE: 98.2 F | HEIGHT: 65 IN | BODY MASS INDEX: 25.16 KG/M2 | HEART RATE: 76 BPM | RESPIRATION RATE: 14 BRPM | OXYGEN SATURATION: 98 % | DIASTOLIC BLOOD PRESSURE: 85 MMHG | SYSTOLIC BLOOD PRESSURE: 140 MMHG | WEIGHT: 151 LBS

## 2018-08-30 LAB — CK SERPL-CCNC: 359 U/L

## 2018-08-30 PROCEDURE — 99215 OFFICE O/P EST HI 40 MIN: CPT

## 2018-09-13 ENCOUNTER — APPOINTMENT (OUTPATIENT)
Dept: DERMATOLOGY | Facility: CLINIC | Age: 53
End: 2018-09-13
Payer: COMMERCIAL

## 2018-09-13 VITALS
SYSTOLIC BLOOD PRESSURE: 120 MMHG | DIASTOLIC BLOOD PRESSURE: 70 MMHG | HEIGHT: 66 IN | WEIGHT: 149 LBS | BODY MASS INDEX: 23.95 KG/M2

## 2018-09-13 DIAGNOSIS — L81.4 OTHER MELANIN HYPERPIGMENTATION: ICD-10-CM

## 2018-09-13 PROCEDURE — 99203 OFFICE O/P NEW LOW 30 MIN: CPT

## 2018-09-29 ENCOUNTER — RX RENEWAL (OUTPATIENT)
Age: 53
End: 2018-09-29

## 2018-10-10 ENCOUNTER — APPOINTMENT (OUTPATIENT)
Dept: PULMONOLOGY | Facility: CLINIC | Age: 53
End: 2018-10-10
Payer: COMMERCIAL

## 2018-10-10 PROCEDURE — 94375 RESPIRATORY FLOW VOLUME LOOP: CPT

## 2018-10-10 PROCEDURE — 94621 CARDIOPULM EXERCISE TESTING: CPT

## 2018-10-13 ENCOUNTER — APPOINTMENT (OUTPATIENT)
Dept: RHEUMATOLOGY | Facility: CLINIC | Age: 53
End: 2018-10-13

## 2018-10-18 ENCOUNTER — APPOINTMENT (OUTPATIENT)
Dept: RHEUMATOLOGY | Facility: CLINIC | Age: 53
End: 2018-10-18
Payer: COMMERCIAL

## 2018-10-18 VITALS
TEMPERATURE: 97.4 F | DIASTOLIC BLOOD PRESSURE: 80 MMHG | HEART RATE: 57 BPM | HEIGHT: 66 IN | WEIGHT: 152 LBS | OXYGEN SATURATION: 99 % | SYSTOLIC BLOOD PRESSURE: 123 MMHG | BODY MASS INDEX: 24.43 KG/M2

## 2018-10-18 LAB
ALBUMIN SERPL ELPH-MCNC: 4.8 G/DL
ALP BLD-CCNC: 76 U/L
ALT SERPL-CCNC: 16 U/L
ANION GAP SERPL CALC-SCNC: 14 MMOL/L
APPEARANCE: CLEAR
AST SERPL-CCNC: 19 U/L
BACTERIA: NEGATIVE
BASOPHILS # BLD AUTO: 0.03 K/UL
BASOPHILS NFR BLD AUTO: 0.4 %
BILIRUB SERPL-MCNC: 0.5 MG/DL
BILIRUBIN URINE: NEGATIVE
BLOOD URINE: NEGATIVE
BUN SERPL-MCNC: 12 MG/DL
C3 SERPL-MCNC: 135 MG/DL
C4 SERPL-MCNC: 48 MG/DL
CALCIUM SERPL-MCNC: 9.8 MG/DL
CHLORIDE SERPL-SCNC: 100 MMOL/L
CK SERPL-CCNC: 378 U/L
CO2 SERPL-SCNC: 27 MMOL/L
COLOR: YELLOW
CREAT SERPL-MCNC: 0.66 MG/DL
CREAT SPEC-SCNC: 30 MG/DL
CREAT/PROT UR: 0.1 RATIO
EOSINOPHIL # BLD AUTO: 0.1 K/UL
EOSINOPHIL NFR BLD AUTO: 1.3 %
ERYTHROCYTE [SEDIMENTATION RATE] IN BLOOD BY WESTERGREN METHOD: 30 MM/HR
GLUCOSE QUALITATIVE U: NEGATIVE MG/DL
GLUCOSE SERPL-MCNC: 104 MG/DL
HCT VFR BLD CALC: 41.8 %
HGB BLD-MCNC: 13.4 G/DL
HYALINE CASTS: 0 /LPF
IMM GRANULOCYTES NFR BLD AUTO: 0.1 %
KETONES URINE: NEGATIVE
LEUKOCYTE ESTERASE URINE: NEGATIVE
LYMPHOCYTES # BLD AUTO: 2.3 K/UL
LYMPHOCYTES NFR BLD AUTO: 30.1 %
MAN DIFF?: NORMAL
MCHC RBC-ENTMCNC: 27.7 PG
MCHC RBC-ENTMCNC: 32.1 GM/DL
MCV RBC AUTO: 86.4 FL
MICROSCOPIC-UA: NORMAL
MONOCYTES # BLD AUTO: 0.39 K/UL
MONOCYTES NFR BLD AUTO: 5.1 %
NEUTROPHILS # BLD AUTO: 4.82 K/UL
NEUTROPHILS NFR BLD AUTO: 63 %
NITRITE URINE: NEGATIVE
PH URINE: 5
PLATELET # BLD AUTO: 212 K/UL
POTASSIUM SERPL-SCNC: 4 MMOL/L
PROT SERPL-MCNC: 7.3 G/DL
PROT UR-MCNC: 4 MG/DL
PROTEIN URINE: NEGATIVE MG/DL
RBC # BLD: 4.84 M/UL
RBC # FLD: 15.5 %
RED BLOOD CELLS URINE: 1 /HPF
SODIUM SERPL-SCNC: 141 MMOL/L
SPECIFIC GRAVITY URINE: 1.01
SQUAMOUS EPITHELIAL CELLS: 0 /HPF
UROBILINOGEN URINE: NEGATIVE MG/DL
WBC # FLD AUTO: 7.65 K/UL
WHITE BLOOD CELLS URINE: 0 /HPF

## 2018-10-18 PROCEDURE — 99215 OFFICE O/P EST HI 40 MIN: CPT

## 2018-10-26 ENCOUNTER — RX RENEWAL (OUTPATIENT)
Age: 53
End: 2018-10-26

## 2018-10-31 ENCOUNTER — APPOINTMENT (OUTPATIENT)
Dept: RHEUMATOLOGY | Facility: CLINIC | Age: 53
End: 2018-10-31

## 2018-10-31 ENCOUNTER — APPOINTMENT (OUTPATIENT)
Dept: INTERNAL MEDICINE | Facility: CLINIC | Age: 53
End: 2018-10-31
Payer: COMMERCIAL

## 2018-10-31 VITALS
RESPIRATION RATE: 14 BRPM | HEIGHT: 66 IN | OXYGEN SATURATION: 97 % | BODY MASS INDEX: 24.75 KG/M2 | SYSTOLIC BLOOD PRESSURE: 122 MMHG | WEIGHT: 154 LBS | HEART RATE: 72 BPM | DIASTOLIC BLOOD PRESSURE: 70 MMHG | TEMPERATURE: 97.6 F

## 2018-10-31 DIAGNOSIS — R06.09 OTHER FORMS OF DYSPNEA: ICD-10-CM

## 2018-10-31 DIAGNOSIS — Z86.2 PERSONAL HISTORY OF DISEASES OF THE BLOOD AND BLOOD-FORMING ORGANS AND CERTAIN DISORDERS INVOLVING THE IMMUNE MECHANISM: ICD-10-CM

## 2018-10-31 PROCEDURE — 99214 OFFICE O/P EST MOD 30 MIN: CPT

## 2018-10-31 NOTE — ASSESSMENT
[FreeTextEntry1] : \par 54 yo F pmhx HTN, HLD, preDM, ARAGON, MCTD, necrotizing myopathy, vit d insuff, B12 def here for f/u\par \par \par hx MCTD, necrotizing myopathy (hx muscle bx)- gets gen arms/legs muscles and joints, recent flare\par -followed by rheum, seen 10/18- awaiting PA for IVIG\par -on elavil qhs for sleep, diclofenac - taking bid prn, neurontin 300mg tid\par -on cellcept since 10/17, 6/18 plt 134--> wnl on repeat 10/18.  No need for heme eval at this time, will cont. to monitor.\par \par GERD, IBS- constipation-\par -on omeprazole prn\par -on metamucil prn\par -hx c-scope Dr. Hammond in 4/18- told nl, advised repeat in 5 yrs.\par -8/18 cmp wnl\par -10/18 cbc wnl\par \par HTN-\par -on amlodipine- benazepril\par -on HCTZ 12.5 mg, adherence counseled\par -6/18 UA no prt, cmp wnl\par -10/18 cmp wnl\par -7/18 echo: EF 69%, mild MR, borderline pulmHTN, nl LV fxn and size, mild diastolic dysfxn\par -hx optho eval 7/18 per pt, told mild changes due to "autoimmune d/o"- f/u pending, referral to new given per request.  Denies eye pain or vision trouble\par -low salt diet advised\par \par hx ARAGON- feels is currently not active\par -seen by cardio 7/18, had echo.  Advised pulm eval\par -see by pulm 7/18, states called and told was "age appropriate" and advised increased exercise only.  office f/u pending.\par \par preDM- 8/18 A1c 5.8  (was 5.7)\par -ADA diet, exercise and wt loss advised\par -check A1c in 3mo\par \par HLD- 8/18 Tchol 213 TG 55  HDL 70, ASCV risk < 7.5\par -low fat diet, exercise and wt loss advised\par \par hx abnl mammo/sono 6/18- advised 6 mo repeat in both, has Rx's ordered by GYN\par \par hx facial rash- \par -followed by derm, to f/u with sx onset.  hx FBSE 9/18, yearly advised.\par -Regular use of sun block for skin cancer prevention advised.\par \par \par HCM\par -hx CPE 8/18\par -hx negative hep C screening 8/18\par -hx flu shot 9/18 at work per pt\par -hx Tdap 8/18\par -to check on shingrix insurance coverage and get okay to get from rheum prior to considering\par -hx negative PAP 6/18 by GYN \par -hx screening colonoscopy 4/18, rec: repeat in 5 yrs\par \par \par Pt's cell: [phone]

## 2018-10-31 NOTE — PHYSICAL EXAM
[No Acute Distress] : no acute distress [Well-Appearing] : well-appearing [Normal Sclera/Conjunctiva] : normal sclera/conjunctiva [PERRL] : pupils equal round and reactive to light [EOMI] : extraocular movements intact [Normal Outer Ear/Nose] : the outer ears and nose were normal in appearance [Normal Oropharynx] : the oropharynx was normal [Normal Nasal Mucosa] : the nasal mucosa was normal [Supple] : supple [No Lymphadenopathy] : no lymphadenopathy [Thyroid Normal, No Nodules] : the thyroid was normal and there were no nodules present [No Respiratory Distress] : no respiratory distress  [Clear to Auscultation] : lungs were clear to auscultation bilaterally [Normal Rate] : normal rate  [Regular Rhythm] : with a regular rhythm [Normal S1, S2] : normal S1 and S2 [No Murmur] : no murmur heard [Pedal Pulses Present] : the pedal pulses are present [No Edema] : there was no peripheral edema [Soft] : abdomen soft [Non Tender] : non-tender [No HSM] : no HSM [Normal Supraclavicular Nodes] : no supraclavicular lymphadenopathy [Normal Posterior Cervical Nodes] : no posterior cervical lymphadenopathy [Normal Anterior Cervical Nodes] : no anterior cervical lymphadenopathy [No CVA Tenderness] : no CVA  tenderness [No Spinal Tenderness] : no spinal tenderness [No Joint Swelling] : no joint swelling [Grossly Normal Strength/Tone] : grossly normal strength/tone [No Rash] : no rash [No Focal Deficits] : no focal deficits [Normal Affect] : the affect was normal [Alert and Oriented x3] : oriented to person, place, and time [Normal Gait] : normal gait [de-identified] : scattered freckles

## 2018-10-31 NOTE — REVIEW OF SYSTEMS
[Negative] : Genitourinary [Fever] : no fever [Chills] : no chills [Dysuria] : no dysuria [Incontinence] : no incontinence [Hematuria] : no hematuria [Dizziness] : no dizziness [FreeTextEntry5] : see HPI [FreeTextEntry6] : see HPI [FreeTextEntry7] : see HPI [FreeTextEntry9] : see HPI [de-identified] : see HPI

## 2018-10-31 NOTE — HISTORY OF PRESENT ILLNESS
[de-identified] : \par 54 yo F pmhx HTN, HLD, preDM, ARAGON, MCTD, necrotizing myopathy, vit d insuff, B12 def here for f/u\par \par Feeling well\par Needs med refills.\par \par \par preDM, HLD-\par -cut down on fats/carbs ~ 1.5 mo now\par -staying active at work and with daily activities\par \par hx MCTD, necrotizing myopathy- gets gen arms/legs muscles and joints, was controlled until few weeks ago, worsened since.  \par -followed by rheum, seen 10/18 awaiting PA for IVIG\par -on elavil qhs for sleep\par -on diclofenac - taking bid prn\par -on neurontin 300mg tid\par -on cellcept since 10/17\par \par GERD, IBS- constipation-\par -on omeprazole prn\par -reports nl appetite and BMs- takes metamucil prn.  Denies BRBPR or melena.\par -hx c-scope Dr. Hammond in 4/18- told nl, advised repeat in 5 yrs.\par \par HTN-\par -on amlodipine- benazepril, taking current dose > 1 yr\par -on HCTZ 12.5 mg > 1 yr, taking 1od x4d as doesn’t like increased urination with use\par -hx optho eval 7/18 per pt, told mild changes due to "autoimmune d/o"- f/u pending, states needs new MD.  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  Advised pulm eval\par -seen by pulm 7/18,had cardiopulm testing 10/18- states called and told was "age appropriate" and advised increased exercise only.  office f/u pending.\par -denies cough, fever, chills, dizziness, CP, leg edema or palpitations.\par \par hx abnl mammo/sono 6/18- advised 6 mo repeat in both, ordered by GYN\par \par hx facial rash on/off- seen by derm 9/18, no rash present at time- advised f/u with onset.  Has FBSE done at visit.\par \par Denies  complaints.

## 2018-11-04 ENCOUNTER — TRANSCRIPTION ENCOUNTER (OUTPATIENT)
Age: 53
End: 2018-11-04

## 2018-11-08 ENCOUNTER — FORM ENCOUNTER (OUTPATIENT)
Age: 53
End: 2018-11-08

## 2018-11-09 ENCOUNTER — TRANSCRIPTION ENCOUNTER (OUTPATIENT)
Age: 53
End: 2018-11-09

## 2018-11-09 ENCOUNTER — OUTPATIENT (OUTPATIENT)
Dept: OUTPATIENT SERVICES | Facility: HOSPITAL | Age: 53
LOS: 1 days | End: 2018-11-09
Payer: COMMERCIAL

## 2018-11-09 ENCOUNTER — APPOINTMENT (OUTPATIENT)
Dept: RADIOLOGY | Facility: CLINIC | Age: 53
End: 2018-11-09
Payer: COMMERCIAL

## 2018-11-09 DIAGNOSIS — Z98.51 TUBAL LIGATION STATUS: Chronic | ICD-10-CM

## 2018-11-09 DIAGNOSIS — G72.89 OTHER SPECIFIED MYOPATHIES: ICD-10-CM

## 2018-11-09 DIAGNOSIS — Z98.89 OTHER SPECIFIED POSTPROCEDURAL STATES: Chronic | ICD-10-CM

## 2018-11-09 DIAGNOSIS — Z98.890 OTHER SPECIFIED POSTPROCEDURAL STATES: Chronic | ICD-10-CM

## 2018-11-09 PROCEDURE — 73521 X-RAY EXAM HIPS BI 2 VIEWS: CPT

## 2018-11-09 PROCEDURE — 73521 X-RAY EXAM HIPS BI 2 VIEWS: CPT | Mod: 26

## 2018-11-13 ENCOUNTER — TRANSCRIPTION ENCOUNTER (OUTPATIENT)
Age: 53
End: 2018-11-13

## 2018-11-14 ENCOUNTER — APPOINTMENT (OUTPATIENT)
Dept: RHEUMATOLOGY | Facility: CLINIC | Age: 53
End: 2018-11-14

## 2018-11-28 ENCOUNTER — CHART COPY (OUTPATIENT)
Age: 53
End: 2018-11-28

## 2018-11-28 ENCOUNTER — APPOINTMENT (OUTPATIENT)
Dept: RHEUMATOLOGY | Facility: CLINIC | Age: 53
End: 2018-11-28

## 2018-11-29 ENCOUNTER — TRANSCRIPTION ENCOUNTER (OUTPATIENT)
Age: 53
End: 2018-11-29

## 2018-11-30 ENCOUNTER — TRANSCRIPTION ENCOUNTER (OUTPATIENT)
Age: 53
End: 2018-11-30

## 2018-12-01 ENCOUNTER — TRANSCRIPTION ENCOUNTER (OUTPATIENT)
Age: 53
End: 2018-12-01

## 2018-12-03 ENCOUNTER — FORM ENCOUNTER (OUTPATIENT)
Age: 53
End: 2018-12-03

## 2018-12-04 ENCOUNTER — APPOINTMENT (OUTPATIENT)
Dept: RHEUMATOLOGY | Facility: CLINIC | Age: 53
End: 2018-12-04
Payer: COMMERCIAL

## 2018-12-04 ENCOUNTER — TRANSCRIPTION ENCOUNTER (OUTPATIENT)
Age: 53
End: 2018-12-04

## 2018-12-04 VITALS
WEIGHT: 155 LBS | BODY MASS INDEX: 25.02 KG/M2 | DIASTOLIC BLOOD PRESSURE: 89 MMHG | SYSTOLIC BLOOD PRESSURE: 147 MMHG | TEMPERATURE: 97.7 F | OXYGEN SATURATION: 97 % | HEART RATE: 61 BPM

## 2018-12-04 PROCEDURE — 99215 OFFICE O/P EST HI 40 MIN: CPT

## 2018-12-04 PROCEDURE — 71046 X-RAY EXAM CHEST 2 VIEWS: CPT

## 2018-12-06 ENCOUNTER — CLINICAL ADVICE (OUTPATIENT)
Age: 53
End: 2018-12-06

## 2018-12-06 ENCOUNTER — TRANSCRIPTION ENCOUNTER (OUTPATIENT)
Age: 53
End: 2018-12-06

## 2018-12-07 ENCOUNTER — TRANSCRIPTION ENCOUNTER (OUTPATIENT)
Age: 53
End: 2018-12-07

## 2018-12-10 ENCOUNTER — TRANSCRIPTION ENCOUNTER (OUTPATIENT)
Age: 53
End: 2018-12-10

## 2018-12-13 ENCOUNTER — APPOINTMENT (OUTPATIENT)
Dept: RHEUMATOLOGY | Facility: CLINIC | Age: 53
End: 2018-12-13

## 2018-12-18 ENCOUNTER — TRANSCRIPTION ENCOUNTER (OUTPATIENT)
Age: 53
End: 2018-12-18

## 2018-12-19 ENCOUNTER — TRANSCRIPTION ENCOUNTER (OUTPATIENT)
Age: 53
End: 2018-12-19

## 2018-12-20 ENCOUNTER — TRANSCRIPTION ENCOUNTER (OUTPATIENT)
Age: 53
End: 2018-12-20

## 2018-12-24 ENCOUNTER — TRANSCRIPTION ENCOUNTER (OUTPATIENT)
Age: 53
End: 2018-12-24

## 2018-12-24 ENCOUNTER — CLINICAL ADVICE (OUTPATIENT)
Age: 53
End: 2018-12-24

## 2018-12-28 ENCOUNTER — TRANSCRIPTION ENCOUNTER (OUTPATIENT)
Age: 53
End: 2018-12-28

## 2018-12-31 ENCOUNTER — APPOINTMENT (OUTPATIENT)
Dept: RHEUMATOLOGY | Facility: CLINIC | Age: 53
End: 2018-12-31

## 2019-01-04 ENCOUNTER — TRANSCRIPTION ENCOUNTER (OUTPATIENT)
Age: 54
End: 2019-01-04

## 2019-01-11 ENCOUNTER — TRANSCRIPTION ENCOUNTER (OUTPATIENT)
Age: 54
End: 2019-01-11

## 2019-01-11 ENCOUNTER — APPOINTMENT (OUTPATIENT)
Dept: RHEUMATOLOGY | Facility: CLINIC | Age: 54
End: 2019-01-11
Payer: COMMERCIAL

## 2019-01-11 VITALS
DIASTOLIC BLOOD PRESSURE: 82 MMHG | HEART RATE: 68 BPM | SYSTOLIC BLOOD PRESSURE: 133 MMHG | OXYGEN SATURATION: 97 % | BODY MASS INDEX: 24.59 KG/M2 | TEMPERATURE: 97.9 F | WEIGHT: 153 LBS | HEIGHT: 66 IN | RESPIRATION RATE: 16 BRPM

## 2019-01-11 PROCEDURE — 96372 THER/PROPH/DIAG INJ SC/IM: CPT

## 2019-01-11 PROCEDURE — 99214 OFFICE O/P EST MOD 30 MIN: CPT | Mod: 25

## 2019-01-11 RX ORDER — METHYLPRED ACET/NACL,ISO-OS/PF 40 MG/ML
40 VIAL (ML) INJECTION
Qty: 1 | Refills: 0 | Status: COMPLETED | OUTPATIENT
Start: 2019-01-11

## 2019-01-11 RX ADMIN — METHYLPREDNISOLONE ACETATE 1 MG/ML: 40 INJECTION, SUSPENSION INTRA-ARTICULAR; INTRALESIONAL; INTRAMUSCULAR; SOFT TISSUE at 00:00

## 2019-01-11 NOTE — ASSESSMENT
[FreeTextEntry1] : 53 year old female with 6+ years of weakness and muscle tenderness and aching. \par positive anti SRP, + RF, + RNP.  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 \par \par \par Risks of, benefits of and alternatives to this treatment plan discussed with patient and patient expressed understanding.

## 2019-01-11 NOTE — HISTORY OF PRESENT ILLNESS
[FreeTextEntry1] : here today for urgent visit \par 4 days after each infusion she had developed a rash on her entire body \par rash has gotten progressively worse with every infusion \par rash is itchy and painful \par no chest pain, no shortness of breath and no gi symptoms. \par

## 2019-01-14 ENCOUNTER — TRANSCRIPTION ENCOUNTER (OUTPATIENT)
Age: 54
End: 2019-01-14

## 2019-01-16 ENCOUNTER — TRANSCRIPTION ENCOUNTER (OUTPATIENT)
Age: 54
End: 2019-01-16

## 2019-01-16 RX ORDER — IMMUNE GLOBULIN INFUSION (HUMAN) 100 MG/ML
30 INJECTION, SOLUTION INTRAVENOUS; SUBCUTANEOUS
Qty: 100 | Refills: 3 | Status: DISCONTINUED | OUTPATIENT
Start: 2018-08-30 | End: 2019-01-16

## 2019-01-17 ENCOUNTER — TRANSCRIPTION ENCOUNTER (OUTPATIENT)
Age: 54
End: 2019-01-17

## 2019-01-25 ENCOUNTER — APPOINTMENT (OUTPATIENT)
Dept: RHEUMATOLOGY | Facility: CLINIC | Age: 54
End: 2019-01-25

## 2019-01-28 ENCOUNTER — CLINICAL ADVICE (OUTPATIENT)
Age: 54
End: 2019-01-28

## 2019-01-28 ENCOUNTER — TRANSCRIPTION ENCOUNTER (OUTPATIENT)
Age: 54
End: 2019-01-28

## 2019-01-30 ENCOUNTER — TRANSCRIPTION ENCOUNTER (OUTPATIENT)
Age: 54
End: 2019-01-30

## 2019-02-08 ENCOUNTER — TRANSCRIPTION ENCOUNTER (OUTPATIENT)
Age: 54
End: 2019-02-08

## 2019-02-20 ENCOUNTER — APPOINTMENT (OUTPATIENT)
Dept: INTERNAL MEDICINE | Facility: CLINIC | Age: 54
End: 2019-02-20
Payer: COMMERCIAL

## 2019-02-20 VITALS
TEMPERATURE: 98.3 F | BODY MASS INDEX: 24.75 KG/M2 | DIASTOLIC BLOOD PRESSURE: 76 MMHG | WEIGHT: 154 LBS | HEIGHT: 66 IN | HEART RATE: 71 BPM | OXYGEN SATURATION: 98 % | SYSTOLIC BLOOD PRESSURE: 130 MMHG | RESPIRATION RATE: 16 BRPM

## 2019-02-20 DIAGNOSIS — Z87.2 PERSONAL HISTORY OF DISEASES OF THE SKIN AND SUBCUTANEOUS TISSUE: ICD-10-CM

## 2019-02-20 DIAGNOSIS — Z87.01 PERSONAL HISTORY OF PNEUMONIA (RECURRENT): ICD-10-CM

## 2019-02-20 DIAGNOSIS — J06.9 ACUTE UPPER RESPIRATORY INFECTION, UNSPECIFIED: ICD-10-CM

## 2019-02-20 PROCEDURE — 99214 OFFICE O/P EST MOD 30 MIN: CPT

## 2019-02-20 RX ORDER — LEVOFLOXACIN 750 MG/1
750 TABLET, FILM COATED ORAL DAILY
Qty: 5 | Refills: 0 | Status: DISCONTINUED | COMMUNITY
Start: 2018-12-06 | End: 2019-02-20

## 2019-02-20 RX ORDER — PREDNISONE 10 MG/1
10 TABLET ORAL
Qty: 30 | Refills: 0 | Status: DISCONTINUED | COMMUNITY
Start: 2019-01-11 | End: 2019-02-20

## 2019-02-20 RX ORDER — DEXAMETHASONE 2 MG/1
2 TABLET ORAL
Qty: 24 | Refills: 1 | Status: DISCONTINUED | COMMUNITY
Start: 2018-12-24 | End: 2019-02-20

## 2019-02-21 PROBLEM — J06.9 ACUTE UPPER RESPIRATORY INFECTION: Status: RESOLVED | Noted: 2018-12-04 | Resolved: 2019-02-21

## 2019-02-21 PROBLEM — Z87.2 HISTORY OF DERMATITIS: Status: RESOLVED | Noted: 2018-09-13 | Resolved: 2019-02-21

## 2019-02-21 PROBLEM — Z87.01 HISTORY OF PNEUMONIA: Status: RESOLVED | Noted: 2018-12-06 | Resolved: 2019-02-21

## 2019-02-21 NOTE — REVIEW OF SYSTEMS
[Negative] : Psychiatric [Fever] : no fever [Chills] : no chills [Chest Pain] : no chest pain [Cough] : no cough [Dysuria] : no dysuria [Incontinence] : no incontinence [Hematuria] : no hematuria [Dizziness] : no dizziness [FreeTextEntry5] : see HPI [FreeTextEntry6] : see HPI [FreeTextEntry7] : see HPI [FreeTextEntry9] : see HPI [de-identified] : see HPI

## 2019-02-21 NOTE — PHYSICAL EXAM
[No Acute Distress] : no acute distress [Well-Appearing] : well-appearing [Normal Sclera/Conjunctiva] : normal sclera/conjunctiva [PERRL] : pupils equal round and reactive to light [EOMI] : extraocular movements intact [Normal Outer Ear/Nose] : the outer ears and nose were normal in appearance [Normal Oropharynx] : the oropharynx was normal [Normal Nasal Mucosa] : the nasal mucosa was normal [Supple] : supple [No Lymphadenopathy] : no lymphadenopathy [Thyroid Normal, No Nodules] : the thyroid was normal and there were no nodules present [No Respiratory Distress] : no respiratory distress  [Clear to Auscultation] : lungs were clear to auscultation bilaterally [Normal Rate] : normal rate  [Regular Rhythm] : with a regular rhythm [Normal S1, S2] : normal S1 and S2 [No Murmur] : no murmur heard [Pedal Pulses Present] : the pedal pulses are present [No Edema] : there was no peripheral edema [Soft] : abdomen soft [Non Tender] : non-tender [No HSM] : no HSM [Normal Supraclavicular Nodes] : no supraclavicular lymphadenopathy [Normal Posterior Cervical Nodes] : no posterior cervical lymphadenopathy [Normal Anterior Cervical Nodes] : no anterior cervical lymphadenopathy [No CVA Tenderness] : no CVA  tenderness [No Spinal Tenderness] : no spinal tenderness [No Joint Swelling] : no joint swelling [Grossly Normal Strength/Tone] : grossly normal strength/tone [Normal Gait] : normal gait [No Focal Deficits] : no focal deficits [Normal Affect] : the affect was normal [Alert and Oriented x3] : oriented to person, place, and time [de-identified] : no photophobia [de-identified] :  scattered well demarcated circular raised lesions with scaling at left side of abdomen/flank, upper legs, no d/c or tenderness, no rash on palms

## 2019-02-21 NOTE — ASSESSMENT
[FreeTextEntry1] : \par 52 yo F pmhx HTN, HLD, preDM, ARAGON, MCTD, necrotizing myopathy, vit d insuff, B12 def here for f/u\par \par \par skin rash- possible tinea corporis, afebrile and HD stable\par -trial of clotrimazole\par -derm eval advised\par -advised to call or f/u if sx's worsen; prompt ER eval advised if fever, chills, sob, etc.\par -cbc/cmp (pending per rheum, has slip)\par \par hx facial rash- ? etiology\par -followed by derm, advised to f/u with sx onset.  hx FBSE 9/18, yearly advised.\par -Regular use of sun block for skin cancer prevention advised.\par \par hx MCTD, necrotizing myopathy (hx muscle bx)- hx gen arms/legs muscles and joints, improved s/p IVIG- stopped 1/19 2/2 concern for related rash resolved now s/p steroid course\par -followed by rheum, has f/u 3/19- awaiting PA for rituxan\par -on elavil qhs for sleep, diclofenac - taking bid prn, neurontin 300mg tid\par -on cellcept since 10/17, 6/18 plt 134--> wnl on repeat 10/18.  No need for heme eval at this time, will cont. to monitor.\par \par GERD, IBS- constipation-\par -on omeprazole prn\par -on metamucil prn\par -hx c-scope Dr. Hammond in 4/18- told nl, advised repeat in 5 yrs.\par -8/18 cmp wnl\par -10/18 cbc wnl\par -repeat labs pending\par \par HTN-\par -on amlodipine- benazepril\par -on HCTZ 12.5 mg qday adherent\par -6/18 UA no prt, cmp wnl\par -10/18 cmp wnl\par -7/18 echo: EF 69%, mild MR, borderline pulmHTN, nl LV fxn and size, mild diastolic dysfxn\par -hx optho eval 1/19 per pt, told stable mild changes due to "autoimmune d/o", new reading glasses given- to f/u 6/19.  Denies eye pain or vision trouble.\par -low salt diet advised\par \par hx ARAGON- feels is currently not active\par -seen by cardio 7/18, had echo.  Advised pulm eval\par -see by pulm 7/18, states called and told was "age appropriate" and advised increased exercise only.  office f/u pending.\par \par preDM- 8/18 A1c 5.8  (was 5.7)\par -ADA diet, exercise and wt loss advised\par -check A1c \par \par HLD- 8/18 Tchol 213 TG 55  HDL 70, ASCV risk < 7.5\par -low fat diet, exercise and wt loss advised\par -check fasting lipids\par \par hx abnl mammo/sono 6/18- advised 6 mo repeat in both, has Rx's ordered by GYN- pending, Rx given today\par \par \par \par HCM\par -hx CPE 8/18\par -hx negative hep C screening 8/18\par -hx flu shot 9/18 at work per pt\par -hx Tdap 8/18\par -to check on shingrix insurance coverage and get okay to get from rheum prior to considering\par -hx negative PAP 6/18 by GYN \par -hx screening colonoscopy 4/18, rec: repeat in 5 yrs\par \par \par Pt's cell: [phone]

## 2019-02-21 NOTE — HISTORY OF PRESENT ILLNESS
[de-identified] : \par 52 yo F pmhx HTN, HLD, preDM, ARAGON, MCTD, necrotizing myopathy, vit d insuff, B12 def here for f/u\par \par \par Needs med refills.\par Not fasting- wants slip\par \par Reports hx URI, cough in 12/18- seen by rheum and tx'd with levaquin with resolved sx's, reports hx negative cxr in office with rheum at time of visit\par \par hx rash 1/19 (arms, upper back, hands/palms)- seen by rheum thought 2/2 IV Ig which was stopped and given steroid course with resolution. \par -planned to start rituxan per rheum, states waiting insurance authorization still\par \par c/o new rash x 2 weeks, mainly on lower trunk, upper legs, mainly itching\par -denies sob, CP, oral lesions/sores, fever, chills or drainage \par -hx similar in past of unclear etiology, usually on face during summer- never got dx from derm as when seen asx in 9/18.  Advised to f/u with picture if recurs.\par -denies new medications foods/detergent/lotions/body sprays/soaps or detergents\par -no recent travel\par -no others in home with similar rash\par -no pets at home\par -using benadryl qhs to help for sleep, does not feel help with rash; no other meds tried\par \par preDM, HLD-\par -cut down on fats/carbs \par -staying active at work and with daily activities\par \par hx MCTD, necrotizing myopathy- gets gen arms/legs muscles and joints, improved  s/p IVIG\par -followed by rheum, has f/u 3/19\par -on elavil qhs for sleep\par -on diclofenac - taking bid prn\par -on neurontin 300mg tid\par -on cellcept since 10/17\par \par GERD, IBS- constipation-\par -on omeprazole prn\par -reports nl appetite and BMs- takes metamucil prn.  Denies BRBPR or melena.\par -hx c-scope Dr. Hammond in 4/18- told nl, advised repeat in 5 yrs.\par \par HTN-\par -on amlodipine- benazepril, taking current dose > 1 yr\par -on HCTZ 12.5 mg - taking daily per pt\par -hx optho eval 1/19 per pt, told stable mild changes due to "autoimmune d/o", new reading glasses given- to f/u 6/19.  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  Advised pulm eval\par -seen by pulm 7/18,had cardiopulm testing 10/18- states called and told was "age appropriate" and advised increased exercise only.  office f/u pending.\par -denies cough, fever, chills, dizziness, CP, leg edema or palpitations.\par \par Denies  complaints.

## 2019-02-25 ENCOUNTER — TRANSCRIPTION ENCOUNTER (OUTPATIENT)
Age: 54
End: 2019-02-25

## 2019-03-02 ENCOUNTER — TRANSCRIPTION ENCOUNTER (OUTPATIENT)
Age: 54
End: 2019-03-02

## 2019-03-04 ENCOUNTER — TRANSCRIPTION ENCOUNTER (OUTPATIENT)
Age: 54
End: 2019-03-04

## 2019-03-06 ENCOUNTER — TRANSCRIPTION ENCOUNTER (OUTPATIENT)
Age: 54
End: 2019-03-06

## 2019-03-06 LAB
ALBUMIN SERPL ELPH-MCNC: 4.5 G/DL
ALP BLD-CCNC: 63 U/L
ALT SERPL-CCNC: 15 U/L
ANION GAP SERPL CALC-SCNC: 14 MMOL/L
APPEARANCE: ABNORMAL
AST SERPL-CCNC: 18 U/L
BACTERIA: ABNORMAL
BASOPHILS # BLD AUTO: 0.03 K/UL
BASOPHILS NFR BLD AUTO: 0.4 %
BILIRUB SERPL-MCNC: 0.5 MG/DL
BILIRUBIN URINE: NEGATIVE
BLOOD URINE: NEGATIVE
BUN SERPL-MCNC: 17 MG/DL
C3 SERPL-MCNC: 124 MG/DL
C4 SERPL-MCNC: 40 MG/DL
CALCIUM SERPL-MCNC: 9.6 MG/DL
CHLORIDE SERPL-SCNC: 104 MMOL/L
CK SERPL-CCNC: 204 U/L
CO2 SERPL-SCNC: 26 MMOL/L
COLOR: YELLOW
CREAT SERPL-MCNC: 0.74 MG/DL
CREAT SPEC-SCNC: 326 MG/DL
CREAT/PROT UR: 0.1 RATIO
CRP SERPL-MCNC: 0.14 MG/DL
DSDNA AB SER-ACNC: <12 IU/ML
EOSINOPHIL # BLD AUTO: 0.1 K/UL
EOSINOPHIL NFR BLD AUTO: 1.5 %
ERYTHROCYTE [SEDIMENTATION RATE] IN BLOOD BY WESTERGREN METHOD: 29 MM/HR
GLUCOSE QUALITATIVE U: NEGATIVE
GLUCOSE SERPL-MCNC: 97 MG/DL
HCT VFR BLD CALC: 42.8 %
HGB BLD-MCNC: 13 G/DL
HYALINE CASTS: 3 /LPF
IMM GRANULOCYTES NFR BLD AUTO: 0.4 %
KETONES URINE: NEGATIVE
LEUKOCYTE ESTERASE URINE: ABNORMAL
LYMPHOCYTES # BLD AUTO: 2.57 K/UL
LYMPHOCYTES NFR BLD AUTO: 38.5 %
MAN DIFF?: NORMAL
MCHC RBC-ENTMCNC: 27.6 PG
MCHC RBC-ENTMCNC: 30.4 GM/DL
MCV RBC AUTO: 90.9 FL
MICROSCOPIC-UA: NORMAL
MONOCYTES # BLD AUTO: 0.44 K/UL
MONOCYTES NFR BLD AUTO: 6.6 %
NEUTROPHILS # BLD AUTO: 3.51 K/UL
NEUTROPHILS NFR BLD AUTO: 52.6 %
NITRITE URINE: NEGATIVE
PH URINE: 5.5
PLATELET # BLD AUTO: 102 K/UL
POTASSIUM SERPL-SCNC: 4 MMOL/L
PROT SERPL-MCNC: 7.3 G/DL
PROT UR-MCNC: 18 MG/DL
PROTEIN URINE: ABNORMAL
RBC # BLD: 4.71 M/UL
RBC # FLD: 16 %
RED BLOOD CELLS URINE: 4 /HPF
SODIUM SERPL-SCNC: 144 MMOL/L
SPECIFIC GRAVITY URINE: 1.04
SQUAMOUS EPITHELIAL CELLS: 9 /HPF
UROBILINOGEN URINE: NORMAL
WBC # FLD AUTO: 6.68 K/UL
WHITE BLOOD CELLS URINE: 4 /HPF

## 2019-03-07 ENCOUNTER — RX RENEWAL (OUTPATIENT)
Age: 54
End: 2019-03-07

## 2019-03-11 ENCOUNTER — APPOINTMENT (OUTPATIENT)
Dept: RHEUMATOLOGY | Facility: CLINIC | Age: 54
End: 2019-03-11
Payer: COMMERCIAL

## 2019-03-11 ENCOUNTER — APPOINTMENT (OUTPATIENT)
Dept: DERMATOLOGY | Facility: CLINIC | Age: 54
End: 2019-03-11
Payer: COMMERCIAL

## 2019-03-11 VITALS — DIASTOLIC BLOOD PRESSURE: 82 MMHG | SYSTOLIC BLOOD PRESSURE: 130 MMHG

## 2019-03-11 VITALS
HEIGHT: 66 IN | SYSTOLIC BLOOD PRESSURE: 133 MMHG | TEMPERATURE: 98.1 F | WEIGHT: 154 LBS | HEART RATE: 74 BPM | BODY MASS INDEX: 24.75 KG/M2 | RESPIRATION RATE: 16 BRPM | DIASTOLIC BLOOD PRESSURE: 78 MMHG | OXYGEN SATURATION: 98 %

## 2019-03-11 PROCEDURE — 99214 OFFICE O/P EST MOD 30 MIN: CPT

## 2019-03-14 ENCOUNTER — RX RENEWAL (OUTPATIENT)
Age: 54
End: 2019-03-14

## 2019-03-14 ENCOUNTER — TRANSCRIPTION ENCOUNTER (OUTPATIENT)
Age: 54
End: 2019-03-14

## 2019-03-22 ENCOUNTER — CLINICAL ADVICE (OUTPATIENT)
Age: 54
End: 2019-03-22

## 2019-03-25 ENCOUNTER — APPOINTMENT (OUTPATIENT)
Dept: DERMATOLOGY | Facility: CLINIC | Age: 54
End: 2019-03-25
Payer: COMMERCIAL

## 2019-03-25 PROCEDURE — 99214 OFFICE O/P EST MOD 30 MIN: CPT | Mod: GC

## 2019-04-02 ENCOUNTER — TRANSCRIPTION ENCOUNTER (OUTPATIENT)
Age: 54
End: 2019-04-02

## 2019-04-03 ENCOUNTER — TRANSCRIPTION ENCOUNTER (OUTPATIENT)
Age: 54
End: 2019-04-03

## 2019-04-08 ENCOUNTER — TRANSCRIPTION ENCOUNTER (OUTPATIENT)
Age: 54
End: 2019-04-08

## 2019-04-09 ENCOUNTER — TRANSCRIPTION ENCOUNTER (OUTPATIENT)
Age: 54
End: 2019-04-09

## 2019-04-11 ENCOUNTER — TRANSCRIPTION ENCOUNTER (OUTPATIENT)
Age: 54
End: 2019-04-11

## 2019-04-12 ENCOUNTER — MEDICATION RENEWAL (OUTPATIENT)
Age: 54
End: 2019-04-12

## 2019-04-12 ENCOUNTER — TRANSCRIPTION ENCOUNTER (OUTPATIENT)
Age: 54
End: 2019-04-12

## 2019-04-20 ENCOUNTER — LABORATORY RESULT (OUTPATIENT)
Age: 54
End: 2019-04-20

## 2019-04-22 ENCOUNTER — TRANSCRIPTION ENCOUNTER (OUTPATIENT)
Age: 54
End: 2019-04-22

## 2019-04-22 LAB
25(OH)D3 SERPL-MCNC: 52.3 NG/ML
ALBUMIN SERPL ELPH-MCNC: 4.3 G/DL
ALP BLD-CCNC: 69 U/L
ALT SERPL-CCNC: 27 U/L
ANION GAP SERPL CALC-SCNC: 12 MMOL/L
AST SERPL-CCNC: 25 U/L
BASOPHILS # BLD AUTO: 0.03 K/UL
BASOPHILS NFR BLD AUTO: 0.5 %
BILIRUB SERPL-MCNC: 0.5 MG/DL
BUN SERPL-MCNC: 11 MG/DL
CALCIUM SERPL-MCNC: 9.3 MG/DL
CHLORIDE SERPL-SCNC: 105 MMOL/L
CHOLEST SERPL-MCNC: 191 MG/DL
CHOLEST/HDLC SERPL: 2.9 RATIO
CO2 SERPL-SCNC: 26 MMOL/L
CREAT SERPL-MCNC: 0.63 MG/DL
CRP SERPL-MCNC: 0.23 MG/DL
EOSINOPHIL # BLD AUTO: 0.08 K/UL
EOSINOPHIL NFR BLD AUTO: 1.4 %
ERYTHROCYTE [SEDIMENTATION RATE] IN BLOOD BY WESTERGREN METHOD: 24 MM/HR
GLUCOSE SERPL-MCNC: 99 MG/DL
HBA1C MFR BLD HPLC: 5.8 %
HCT VFR BLD CALC: 40.9 %
HDLC SERPL-MCNC: 65 MG/DL
HGB BLD-MCNC: 12.5 G/DL
IGA SER QL IEP: 165 MG/DL
IGG SER QL IEP: 1059 MG/DL
IGM SER QL IEP: 66 MG/DL
IMM GRANULOCYTES NFR BLD AUTO: 0.2 %
LDLC SERPL CALC-MCNC: 116 MG/DL
LYMPHOCYTES # BLD AUTO: 2.21 K/UL
LYMPHOCYTES NFR BLD AUTO: 38.6 %
MAN DIFF?: NORMAL
MCHC RBC-ENTMCNC: 27.5 PG
MCHC RBC-ENTMCNC: 30.6 GM/DL
MCV RBC AUTO: 89.9 FL
MONOCYTES # BLD AUTO: 0.42 K/UL
MONOCYTES NFR BLD AUTO: 7.3 %
NEUTROPHILS # BLD AUTO: 2.98 K/UL
NEUTROPHILS NFR BLD AUTO: 52 %
PLATELET # BLD AUTO: NORMAL K/UL
POTASSIUM SERPL-SCNC: 4.5 MMOL/L
PROT SERPL-MCNC: 6.9 G/DL
RBC # BLD: 4.55 M/UL
RBC # FLD: 14.6 %
SODIUM SERPL-SCNC: 143 MMOL/L
TRIGL SERPL-MCNC: 49 MG/DL
WBC # FLD AUTO: 5.73 K/UL

## 2019-04-23 ENCOUNTER — LABORATORY RESULT (OUTPATIENT)
Age: 54
End: 2019-04-23

## 2019-04-23 ENCOUNTER — APPOINTMENT (OUTPATIENT)
Dept: RHEUMATOLOGY | Facility: CLINIC | Age: 54
End: 2019-04-23
Payer: COMMERCIAL

## 2019-04-23 PROCEDURE — 96415 CHEMO IV INFUSION ADDL HR: CPT

## 2019-04-23 PROCEDURE — 96375 TX/PRO/DX INJ NEW DRUG ADDON: CPT

## 2019-04-23 PROCEDURE — 96413 CHEMO IV INFUSION 1 HR: CPT

## 2019-04-23 PROCEDURE — 36415 COLL VENOUS BLD VENIPUNCTURE: CPT

## 2019-04-24 ENCOUNTER — TRANSCRIPTION ENCOUNTER (OUTPATIENT)
Age: 54
End: 2019-04-24

## 2019-04-25 ENCOUNTER — APPOINTMENT (OUTPATIENT)
Dept: RHEUMATOLOGY | Facility: CLINIC | Age: 54
End: 2019-04-25

## 2019-04-29 ENCOUNTER — TRANSCRIPTION ENCOUNTER (OUTPATIENT)
Age: 54
End: 2019-04-29

## 2019-04-30 ENCOUNTER — TRANSCRIPTION ENCOUNTER (OUTPATIENT)
Age: 54
End: 2019-04-30

## 2019-05-01 ENCOUNTER — TRANSCRIPTION ENCOUNTER (OUTPATIENT)
Age: 54
End: 2019-05-01

## 2019-05-06 ENCOUNTER — APPOINTMENT (OUTPATIENT)
Dept: DERMATOLOGY | Facility: CLINIC | Age: 54
End: 2019-05-06

## 2019-05-14 ENCOUNTER — TRANSCRIPTION ENCOUNTER (OUTPATIENT)
Age: 54
End: 2019-05-14

## 2019-05-16 ENCOUNTER — LABORATORY RESULT (OUTPATIENT)
Age: 54
End: 2019-05-16

## 2019-05-16 ENCOUNTER — APPOINTMENT (OUTPATIENT)
Dept: RHEUMATOLOGY | Facility: CLINIC | Age: 54
End: 2019-05-16
Payer: COMMERCIAL

## 2019-05-16 PROCEDURE — 96375 TX/PRO/DX INJ NEW DRUG ADDON: CPT

## 2019-05-16 PROCEDURE — 36415 COLL VENOUS BLD VENIPUNCTURE: CPT

## 2019-05-16 PROCEDURE — 96413 CHEMO IV INFUSION 1 HR: CPT

## 2019-05-16 PROCEDURE — 96415 CHEMO IV INFUSION ADDL HR: CPT

## 2019-05-20 ENCOUNTER — TRANSCRIPTION ENCOUNTER (OUTPATIENT)
Age: 54
End: 2019-05-20

## 2019-05-28 ENCOUNTER — APPOINTMENT (OUTPATIENT)
Dept: RHEUMATOLOGY | Facility: CLINIC | Age: 54
End: 2019-05-28
Payer: COMMERCIAL

## 2019-05-28 VITALS
DIASTOLIC BLOOD PRESSURE: 92 MMHG | BODY MASS INDEX: 26.03 KG/M2 | HEART RATE: 76 BPM | HEIGHT: 66 IN | WEIGHT: 162 LBS | OXYGEN SATURATION: 98 % | SYSTOLIC BLOOD PRESSURE: 143 MMHG | TEMPERATURE: 98.4 F | RESPIRATION RATE: 16 BRPM

## 2019-05-28 PROCEDURE — 99214 OFFICE O/P EST MOD 30 MIN: CPT

## 2019-05-31 ENCOUNTER — RX RENEWAL (OUTPATIENT)
Age: 54
End: 2019-05-31

## 2019-06-12 ENCOUNTER — APPOINTMENT (OUTPATIENT)
Dept: INTERNAL MEDICINE | Facility: CLINIC | Age: 54
End: 2019-06-12
Payer: COMMERCIAL

## 2019-06-12 VITALS
BODY MASS INDEX: 26.03 KG/M2 | WEIGHT: 162 LBS | SYSTOLIC BLOOD PRESSURE: 134 MMHG | HEIGHT: 66 IN | HEART RATE: 82 BPM | DIASTOLIC BLOOD PRESSURE: 82 MMHG | TEMPERATURE: 98.5 F | OXYGEN SATURATION: 98 % | RESPIRATION RATE: 16 BRPM

## 2019-06-12 DIAGNOSIS — E55.9 VITAMIN D DEFICIENCY, UNSPECIFIED: ICD-10-CM

## 2019-06-12 DIAGNOSIS — Z87.2 PERSONAL HISTORY OF DISEASES OF THE SKIN AND SUBCUTANEOUS TISSUE: ICD-10-CM

## 2019-06-12 DIAGNOSIS — E53.8 DEFICIENCY OF OTHER SPECIFIED B GROUP VITAMINS: ICD-10-CM

## 2019-06-12 PROCEDURE — 99214 OFFICE O/P EST MOD 30 MIN: CPT

## 2019-06-12 RX ORDER — TRIAMCINOLONE ACETONIDE 1 MG/G
0.1 OINTMENT TOPICAL
Qty: 1 | Refills: 0 | Status: DISCONTINUED | COMMUNITY
Start: 2019-03-11 | End: 2019-06-12

## 2019-06-12 RX ORDER — CAMPHOR AND MENTHOL 5; 5 MG/ML; MG/ML
0.5-0.5 LOTION TOPICAL
Qty: 1 | Refills: 11 | Status: DISCONTINUED | COMMUNITY
Start: 2019-03-25 | End: 2019-06-12

## 2019-06-12 RX ORDER — MYCOPHENOLATE MOFETIL 500 MG/1
500 TABLET ORAL
Qty: 540 | Refills: 2 | Status: DISCONTINUED | COMMUNITY
Start: 2017-10-17 | End: 2019-06-12

## 2019-06-12 RX ORDER — CLOTRIMAZOLE 10 MG/G
1 CREAM TOPICAL
Qty: 1 | Refills: 1 | Status: DISCONTINUED | COMMUNITY
Start: 2019-02-20 | End: 2019-06-12

## 2019-06-12 RX ORDER — AMITRIPTYLINE HYDROCHLORIDE 10 MG/1
10 TABLET, FILM COATED ORAL
Refills: 0 | Status: DISCONTINUED | COMMUNITY
Start: 2018-03-27 | End: 2019-06-12

## 2019-06-12 RX ORDER — HYDROXYZINE HYDROCHLORIDE 10 MG/1
10 TABLET ORAL
Qty: 30 | Refills: 1 | Status: DISCONTINUED | COMMUNITY
Start: 2019-03-11 | End: 2019-06-12

## 2019-06-12 NOTE — ASSESSMENT
[FreeTextEntry1] : \par 52 yo F pmhx HTN, HLD, preDM, ARAGON, MCTD, necrotizing myopathy, vit d insuff, B12 def here for f/u\par \par \par hx skin rash, itching- resolved, dx'd eczematous dermatitis by derm 3/19 with amitriptyline dose increased  \par -followed by derm\par -on amitriptyline\par -hx FBSE 9/18, yearly advised.  Regular use of sun block for skin cancer prevention advised.\par \par hx MCTD, necrotizing myopathy (hx muscle bx)- hx gen arms/legs muscles and joints, improved on Rituxan\par -followed by rheum, has f/u 7/19\par -PT pending\par -hx improved s/p IVIG- stopped 1/19 2/2 concern for related rash resolved now s/p steroid course\par -off CellCept 4/19\par -on Rituxan since 4/19\par -on amitriptyline qhs for sleep, diclofenac - taking bid prn, neurontin \par \par GERD, IBS- constipation- hx hemorrhoids\par -on omeprazole prn\par -on metamucil prn\par -on prepH prn\par -hx c-scope Dr. Hammond in 4/18- told nl, advised repeat in 5 yrs.\par -followed by GI, plans to f/u soon- asked to forward records\par -advised to f/u if sx's woresn\par \par HTN- BP wnl\par -7/18 echo: EF 69%, mild MR, borderline pulm HTN, nl LV fxn and size, mild diastolic dysfxn\par -on amlodipine- benazepril\par -on HCTZ 12.5 mg qday, adherent\par -5/19 bmp wnl\par -followed by optho, hx eval 1/19 \par -low salt diet advised\par \par hx ARAGON- feels is currently not active\par -seen by cardio 7/18, had echo.  Advised pulm eval\par -see by pulm 7/18, states called and told was "age appropriate" and advised increased exercise only.  office f/u pending.\par \par preDM- 4/19 A1c 5.8 \par -ADA diet, exercise and wt loss advised\par -declines nutritionist referral\par -check A1c at CPE 8/19\par \par HLD- 4/19 Tchol 191 TG 49  HDL 65\par -low fat diet, exercise and wt loss advised\par \par hx vit d def-\par -on 2000U/d\par -4/19 level wnl\par \par hx B12 def-\par -on oral supplement\par -8/18 level adequate\par \par \par HCM\par -hx CPE 8/18\par -hx negative hep C screening 8/18\par -hx flu shot 9/18 at work per pt\par -hx Tdap 8/18\par -to check on shingrix insurance coverage and get okay to get from rheum prior to considering\par -hx negative PAP 6/18 by GYN, has f/u 6/15/19\par -hx negative mammo 6/18, has Rx- plans also to get from GYN at visit 6/19 (per radiology facility per pt)\par -hx screening colonoscopy 4/18, rec: repeat in 5 yrs\par \par \par Pt has prior scheduled office appt 8/28/19 for CPE and fasting labs.\par Pt's cell: [phone]

## 2019-06-12 NOTE — HISTORY OF PRESENT ILLNESS
[de-identified] : \par 54 yo F pmhx HTN, HLD, preDM, hx ARAGON (resolved), MCTD, necrotizing myopathy, vit d insuff, B12 def here for f/u\par \par Feeling well.\par Needs med refills.\par \par \par Reports hx hemorrhoids bulging and minimally uncomfortable recently, better with prep H\par -denies bleeding or pain \par -hx constipation on/off- metamucil helps\par -followed by GI, Dr. Hammond- plans to f/u soon\par \par hx rash, itching - reports all resolved\par -no help with clotrimazole trial\par -followed by derm, hx topicals and hydroxyzine course, last seen 3/19 dx'd eczematous dermatitis with amitriptyline dose increased\par \par preDM, HLD-\par -cut down on fats/carbs \par -staying active at work and with daily activities, also doing daily stretching at home ~ 20 mins/d\par \par hx MCTD, necrotizing myopathy- gets gen arms/legs muscles and joints, significantly improved on Rituxan (started 4/19 when CellCept d/c'd)\par -followed by rheum, has f/u 7/19\par -awaiting PT\par -on elavil \par -on diclofenac - taking bid prn\par -on neurontin 300mg \par \par GERD, IBS- constipation-\par -on omeprazole prn\par -reports nl appetite and BMs- takes metamucil prn.  Denies BRBPR or melena.\par -hx c-scope Dr. 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  Advised pulm eval\par -seen by pulm 7/18,had cardiopulm testing 10/18- states called and told was "age appropriate" and advised increased exercise only.  office f/u pending.\par -denies cough, fever, chills, dizziness, CP, leg edema or palpitations.\par \par Denies  complaints.

## 2019-06-12 NOTE — REVIEW OF SYSTEMS
[Negative] : Respiratory [Fever] : no fever [Chills] : no chills [Chest Pain] : no chest pain [Cough] : no cough [Incontinence] : no incontinence [Dysuria] : no dysuria [Dizziness] : no dizziness [Hematuria] : no hematuria [FreeTextEntry9] : see HPI [FreeTextEntry7] : see HPI [de-identified] : see HPI

## 2019-06-12 NOTE — PHYSICAL EXAM
[No Acute Distress] : no acute distress [Normal Sclera/Conjunctiva] : normal sclera/conjunctiva [PERRL] : pupils equal round and reactive to light [Well-Appearing] : well-appearing [Normal Outer Ear/Nose] : the outer ears and nose were normal in appearance [EOMI] : extraocular movements intact [Normal Oropharynx] : the oropharynx was normal [Normal Nasal Mucosa] : the nasal mucosa was normal [Thyroid Normal, No Nodules] : the thyroid was normal and there were no nodules present [Supple] : supple [No Lymphadenopathy] : no lymphadenopathy [Clear to Auscultation] : lungs were clear to auscultation bilaterally [No Respiratory Distress] : no respiratory distress  [Regular Rhythm] : with a regular rhythm [Normal Rate] : normal rate  [No Murmur] : no murmur heard [Normal S1, S2] : normal S1 and S2 [Pedal Pulses Present] : the pedal pulses are present [No Edema] : there was no peripheral edema [Non Tender] : non-tender [Soft] : abdomen soft [No HSM] : no HSM [Normal Supraclavicular Nodes] : no supraclavicular lymphadenopathy [Normal Posterior Cervical Nodes] : no posterior cervical lymphadenopathy [Normal Anterior Cervical Nodes] : no anterior cervical lymphadenopathy [No Spinal Tenderness] : no spinal tenderness [No CVA Tenderness] : no CVA  tenderness [No Joint Swelling] : no joint swelling [Grossly Normal Strength/Tone] : grossly normal strength/tone [Normal Gait] : normal gait [No Focal Deficits] : no focal deficits [Normal Affect] : the affect was normal [Alert and Oriented x3] : oriented to person, place, and time [No Rash] : no rash

## 2019-06-13 ENCOUNTER — APPOINTMENT (OUTPATIENT)
Dept: RHEUMATOLOGY | Facility: CLINIC | Age: 54
End: 2019-06-13

## 2019-06-25 DIAGNOSIS — Z87.19 PERSONAL HISTORY OF OTHER DISEASES OF THE DIGESTIVE SYSTEM: ICD-10-CM

## 2019-06-29 ENCOUNTER — LABORATORY RESULT (OUTPATIENT)
Age: 54
End: 2019-06-29

## 2019-06-30 ENCOUNTER — TRANSCRIPTION ENCOUNTER (OUTPATIENT)
Age: 54
End: 2019-06-30

## 2019-06-30 LAB
ALBUMIN SERPL ELPH-MCNC: 4.3 G/DL
ALP BLD-CCNC: 67 U/L
ALT SERPL-CCNC: 16 U/L
ANION GAP SERPL CALC-SCNC: 12 MMOL/L
AST SERPL-CCNC: 17 U/L
BASOPHILS # BLD AUTO: 0.04 K/UL
BASOPHILS NFR BLD AUTO: 0.6 %
BILIRUB SERPL-MCNC: 0.4 MG/DL
BUN SERPL-MCNC: 20 MG/DL
CALCIUM SERPL-MCNC: 9.8 MG/DL
CELLS.CD3-CD19+/CELLS IN BLOOD: <1 %
CHLORIDE SERPL-SCNC: 102 MMOL/L
CK SERPL-CCNC: 220 U/L
CO2 SERPL-SCNC: 26 MMOL/L
CREAT SERPL-MCNC: 0.68 MG/DL
CRP SERPL-MCNC: 0.29 MG/DL
EOSINOPHIL # BLD AUTO: 0.11 K/UL
EOSINOPHIL NFR BLD AUTO: 1.5 %
ERYTHROCYTE [SEDIMENTATION RATE] IN BLOOD BY WESTERGREN METHOD: 21 MM/HR
GLUCOSE SERPL-MCNC: 107 MG/DL
HCT VFR BLD CALC: 40.9 %
HGB BLD-MCNC: 12.4 G/DL
IMM GRANULOCYTES NFR BLD AUTO: 0.3 %
LYMPHOCYTES # BLD AUTO: 2.01 K/UL
LYMPHOCYTES NFR BLD AUTO: 27.8 %
MAN DIFF?: NORMAL
MCHC RBC-ENTMCNC: 27.5 PG
MCHC RBC-ENTMCNC: 30.3 GM/DL
MCV RBC AUTO: 90.7 FL
MONOCYTES # BLD AUTO: 0.61 K/UL
MONOCYTES NFR BLD AUTO: 8.4 %
NEUTROPHILS # BLD AUTO: 4.43 K/UL
NEUTROPHILS NFR BLD AUTO: 61.4 %
PLATELET # BLD AUTO: NORMAL K/UL
POTASSIUM SERPL-SCNC: 4.5 MMOL/L
PROT SERPL-MCNC: 7 G/DL
RBC # BLD: 4.51 M/UL
RBC # FLD: 15 %
SODIUM SERPL-SCNC: 140 MMOL/L
WBC # FLD AUTO: 7.22 K/UL

## 2019-06-30 NOTE — ED PROVIDER NOTE - NEUROLOGICAL, MLM
GIB   on protonix   fu with GI     Herpes   on valacyclovir    PreOP  Based on current ACC/AHA guidelines, patient history and physical exam, the patient is considered to have low risk   echo personally reviewed , no emergent findings  no objection to proceed to OR as planned Alert and oriented, no focal deficits, no motor or sensory deficits.

## 2019-07-01 LAB
IGA SER QL IEP: 184 MG/DL
IGG SER QL IEP: 1265 MG/DL
IGM SER QL IEP: 67 MG/DL

## 2019-07-05 ENCOUNTER — APPOINTMENT (OUTPATIENT)
Dept: RHEUMATOLOGY | Facility: CLINIC | Age: 54
End: 2019-07-05
Payer: COMMERCIAL

## 2019-07-05 VITALS
WEIGHT: 163 LBS | OXYGEN SATURATION: 97 % | DIASTOLIC BLOOD PRESSURE: 89 MMHG | SYSTOLIC BLOOD PRESSURE: 149 MMHG | HEART RATE: 56 BPM | BODY MASS INDEX: 26.31 KG/M2

## 2019-07-05 PROCEDURE — 99214 OFFICE O/P EST MOD 30 MIN: CPT

## 2019-07-05 RX ORDER — DICLOFENAC SODIUM 75 MG/1
75 TABLET, DELAYED RELEASE ORAL
Qty: 60 | Refills: 0 | Status: DISCONTINUED | COMMUNITY
Start: 2018-02-01 | End: 2019-07-05

## 2019-07-29 ENCOUNTER — TRANSCRIPTION ENCOUNTER (OUTPATIENT)
Age: 54
End: 2019-07-29

## 2019-08-19 ENCOUNTER — TRANSCRIPTION ENCOUNTER (OUTPATIENT)
Age: 54
End: 2019-08-19

## 2019-08-24 ENCOUNTER — LABORATORY RESULT (OUTPATIENT)
Age: 54
End: 2019-08-24

## 2019-08-25 LAB
25(OH)D3 SERPL-MCNC: 42.1 NG/ML
ALBUMIN SERPL ELPH-MCNC: 4.6 G/DL
ALP BLD-CCNC: 73 U/L
ALT SERPL-CCNC: 19 U/L
ANION GAP SERPL CALC-SCNC: 14 MMOL/L
APPEARANCE: CLEAR
AST SERPL-CCNC: 19 U/L
BACTERIA: NEGATIVE
BASOPHILS # BLD AUTO: 0.05 K/UL
BASOPHILS NFR BLD AUTO: 0.7 %
BILIRUB SERPL-MCNC: 0.4 MG/DL
BILIRUBIN URINE: NEGATIVE
BLOOD URINE: NEGATIVE
BUN SERPL-MCNC: 14 MG/DL
CALCIUM SERPL-MCNC: 9.5 MG/DL
CHLORIDE SERPL-SCNC: 105 MMOL/L
CK SERPL-CCNC: 156 U/L
CO2 SERPL-SCNC: 25 MMOL/L
COLOR: NORMAL
CREAT SERPL-MCNC: 0.63 MG/DL
CREAT SPEC-SCNC: 62 MG/DL
CREAT/PROT UR: 0.1 RATIO
CRP SERPL-MCNC: 0.26 MG/DL
EOSINOPHIL # BLD AUTO: 0.16 K/UL
EOSINOPHIL NFR BLD AUTO: 2.2 %
ERYTHROCYTE [SEDIMENTATION RATE] IN BLOOD BY WESTERGREN METHOD: 24 MM/HR
GLUCOSE QUALITATIVE U: NEGATIVE
GLUCOSE SERPL-MCNC: 106 MG/DL
HCT VFR BLD CALC: 44.8 %
HGB BLD-MCNC: 13.6 G/DL
HYALINE CASTS: 1 /LPF
IGA SER QL IEP: 211 MG/DL
IGG SER QL IEP: 1297 MG/DL
IGM SER QL IEP: 69 MG/DL
IMM GRANULOCYTES NFR BLD AUTO: 0.1 %
KETONES URINE: NEGATIVE
LEUKOCYTE ESTERASE URINE: NEGATIVE
LYMPHOCYTES # BLD AUTO: 2.02 K/UL
LYMPHOCYTES NFR BLD AUTO: 27.8 %
MAN DIFF?: NORMAL
MCHC RBC-ENTMCNC: 27 PG
MCHC RBC-ENTMCNC: 30.4 GM/DL
MCV RBC AUTO: 88.9 FL
MICROSCOPIC-UA: NORMAL
MONOCYTES # BLD AUTO: 0.57 K/UL
MONOCYTES NFR BLD AUTO: 7.9 %
NEUTROPHILS # BLD AUTO: 4.45 K/UL
NEUTROPHILS NFR BLD AUTO: 61.3 %
NITRITE URINE: NEGATIVE
PH URINE: 6
PLATELET # BLD AUTO: NORMAL K/UL
POTASSIUM SERPL-SCNC: 4.2 MMOL/L
PROT SERPL-MCNC: 7 G/DL
PROT UR-MCNC: 5 MG/DL
PROTEIN URINE: NEGATIVE
RBC # BLD: 5.04 M/UL
RBC # FLD: 14.6 %
RED BLOOD CELLS URINE: 1 /HPF
SODIUM SERPL-SCNC: 144 MMOL/L
SPECIFIC GRAVITY URINE: 1.01
SQUAMOUS EPITHELIAL CELLS: 3 /HPF
UROBILINOGEN URINE: NORMAL
WBC # FLD AUTO: 7.26 K/UL
WHITE BLOOD CELLS URINE: 2 /HPF

## 2019-08-26 LAB — CELLS.CD3-CD19+/CELLS IN BLOOD: <1 %

## 2019-08-28 ENCOUNTER — APPOINTMENT (OUTPATIENT)
Dept: INTERNAL MEDICINE | Facility: CLINIC | Age: 54
End: 2019-08-28
Payer: COMMERCIAL

## 2019-08-28 ENCOUNTER — NON-APPOINTMENT (OUTPATIENT)
Age: 54
End: 2019-08-28

## 2019-08-28 ENCOUNTER — APPOINTMENT (OUTPATIENT)
Dept: RHEUMATOLOGY | Facility: CLINIC | Age: 54
End: 2019-08-28
Payer: COMMERCIAL

## 2019-08-28 VITALS
DIASTOLIC BLOOD PRESSURE: 81 MMHG | BODY MASS INDEX: 25.39 KG/M2 | HEART RATE: 70 BPM | TEMPERATURE: 97.5 F | HEIGHT: 66 IN | WEIGHT: 158 LBS | SYSTOLIC BLOOD PRESSURE: 119 MMHG | OXYGEN SATURATION: 98 %

## 2019-08-28 VITALS
OXYGEN SATURATION: 98 % | RESPIRATION RATE: 16 BRPM | BODY MASS INDEX: 25.39 KG/M2 | HEART RATE: 66 BPM | WEIGHT: 158 LBS | HEIGHT: 66 IN | DIASTOLIC BLOOD PRESSURE: 70 MMHG | SYSTOLIC BLOOD PRESSURE: 106 MMHG | TEMPERATURE: 98.6 F

## 2019-08-28 DIAGNOSIS — T80.69XA OTHER SERUM REACTION DUE TO OTHER SERUM, INITIAL ENCOUNTER: ICD-10-CM

## 2019-08-28 DIAGNOSIS — Z12.83 ENCOUNTER FOR SCREENING FOR MALIGNANT NEOPLASM OF SKIN: ICD-10-CM

## 2019-08-28 DIAGNOSIS — Z87.891 PERSONAL HISTORY OF NICOTINE DEPENDENCE: ICD-10-CM

## 2019-08-28 PROCEDURE — 99396 PREV VISIT EST AGE 40-64: CPT | Mod: 25

## 2019-08-28 PROCEDURE — 99214 OFFICE O/P EST MOD 30 MIN: CPT

## 2019-08-28 PROCEDURE — G0442 ANNUAL ALCOHOL SCREEN 15 MIN: CPT

## 2019-08-28 PROCEDURE — G0444 DEPRESSION SCREEN ANNUAL: CPT

## 2019-08-28 PROCEDURE — 93000 ELECTROCARDIOGRAM COMPLETE: CPT

## 2019-08-29 ENCOUNTER — TRANSCRIPTION ENCOUNTER (OUTPATIENT)
Age: 54
End: 2019-08-29

## 2019-08-30 PROBLEM — T80.69XA SERUM SICKNESS: Status: RESOLVED | Noted: 2019-01-11 | Resolved: 2019-08-30

## 2019-08-30 PROBLEM — Z12.83 SKIN EXAM, SCREENING FOR CANCER: Status: RESOLVED | Noted: 2018-09-13 | Resolved: 2019-08-30

## 2019-08-30 NOTE — ASSESSMENT
[FreeTextEntry1] : \par 53 yo F pmhx HTN, HLD, preDM, ARAGON, MCTD, necrotizing myopathy, vit d insuff, B12 def here for CPE\par \par \par hx MCTD, necrotizing myopathy (hx muscle bx)-  saw rheum today, feels joint pains and gen aching getting worse -to start CellCept bid 8/29/19, Naprosyn increased to 500 mg bid, plans to f/u 10/18 for re-eval re: ? rituxan per pt\par -repeat echo pending per rheum\par -on neurontin 400 mg qhs since 7/19\par -on amitriptyline qhs for sleep\par -doing PT\par \par HTN- c/o atypical CP x 2 mo; VSS and asx currently\par -8/19 cbc/cmp wnl\par -8/18 UA no prt, prt/crt wnl\par -EKG today: NSR @ 64 bpm, nl axis, no LVH/path Q; nonspecific T changes (no chg c/w 12/18)\par -7/18 echo: EF 69%, mild MR, borderline pulm HTN, nl LV fxn and size, mild diastolic dysfxn.  Repeat pending per rheum\par -on amlodipine- benazepril\par -on HCTZ 12.5 mg qday, adherent\par -cardio f/u advised, referral given\par -followed by optho, hx eval 1/19 \par -low salt diet advised\par -check BMP/TSH/A1c\par -advised prompt ER eval if sx's worsen\par \par hx ARAGON- improved per pt\par -seen by cardio 7/18, had echo.  Advised pulm eval\par -see by pulm 7/18, states called and told was "age appropriate" and advised increased exercise only.  office f/u pending.\par \par preDM- 4/19 A1c 5.8 \par -ADA diet, exercise and wt loss advised\par -declines nutritionist referral\par -check A1c\par \par HLD- 4/19 Tchol 191 TG 49  HDL 65\par -low fat diet, exercise and wt loss advised\par -check lipids\par \par GERD, IBS/constipation, hx hemorrhoids-\par -on omeprazole prn\par -on metamucil prn\par -on prepH prn\par -hx c-scope by Dr. 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  Regular use of sun block for skin cancer prevention advised.\par \par stress- 2/2 sick mother, denies depression or anxiety\par -BH referral given, also given info for [employer] [facility]\par -info for behavioral health crisis walk-in center given\par -advised to f/u if sx's worsen\par \par \par HCM\par -8/19 cbc/cmp wnl; UA no prt, prt/crt wnl\par -check screening labs; agreeable to HIV/STD screening- slip given per request\par -hx negative hep C screening 8/18\par -hx flu shot 9/18 at work per pt\par -hx Tdap 8/18\par -advised to check on shingrix insurance coverage and get okay to get from rheum prior to considering\par -hx negative PAP 8/19 by GYN per pt\par -hx negative mammo 6/18, reports had repeat today (Rx by GYN)- results pending.  Awaiting US 9/19.\par -hx screening colonoscopy 4/18, rec: repeat in 5 yrs\par \par Pt's cell: [phone]

## 2019-08-30 NOTE — HEALTH RISK ASSESSMENT
[0] : 2) Feeling down, depressed, or hopeless: Not at all (0) [Patient reported PAP Smear was normal] : Patient reported PAP Smear was normal [Patient reported colonoscopy was normal] : Patient reported colonoscopy was normal [Carbon Monoxide Detector] : carbon monoxide detector [Smoke Detector] : smoke detector [Seat Belt] :  uses seat belt [Sunscreen] : uses sunscreen [HIV Test offered] : HIV Test offered [Guns at Home] : guns at home [Yes] : Yes [2 - 4 times a month (2 pts)] : 2-4 times a month (2 points) [Never (0 pts)] : Never (0 points) [1 or 2 (0 pts)] : 1 or 2 (0 points) [Feels Safe at Home] : Feels safe at home [Audit-CScore] : 2 [MammogramDate] : 08/18 [PapSmearDate] : 08/19 [MammogramComments] : results pending, US pending 9/19 [ColonoscopyDate] : 04/18 [ColonoscopyComments] : told nl and repeat in 5 yrs per pt [HepatitisCComments] : negative [HepatitisCDate] : 05/18 [de-identified] :  is retired - kept in locked safe

## 2019-08-30 NOTE — REVIEW OF SYSTEMS
[Negative] : Integumentary [Fever] : no fever [Chills] : no chills [Wheezing] : no wheezing [Abdominal Pain] : no abdominal pain [Cough] : no cough [Heartburn] : no heartburn [Melena] : no melena [Dysuria] : no dysuria [Incontinence] : no incontinence [Hematuria] : no hematuria [Dizziness] : no dizziness [Suicidal] : not suicidal [Depression] : no depression [FreeTextEntry5] : see HPI [FreeTextEntry6] : see HPI [de-identified] : see HPI [FreeTextEntry9] : see HPI

## 2019-08-30 NOTE — PHYSICAL EXAM
[No Acute Distress] : no acute distress [Well-Appearing] : well-appearing [Normal Sclera/Conjunctiva] : normal sclera/conjunctiva [PERRL] : pupils equal round and reactive to light [EOMI] : extraocular movements intact [Normal Oropharynx] : the oropharynx was normal [Normal Outer Ear/Nose] : the outer ears and nose were normal in appearance [Supple] : supple [Normal Nasal Mucosa] : the nasal mucosa was normal [No Lymphadenopathy] : no lymphadenopathy [Thyroid Normal, No Nodules] : the thyroid was normal and there were no nodules present [No Respiratory Distress] : no respiratory distress  [Normal Rate] : normal rate  [Clear to Auscultation] : lungs were clear to auscultation bilaterally [Normal S1, S2] : normal S1 and S2 [Regular Rhythm] : with a regular rhythm [Pedal Pulses Present] : the pedal pulses are present [No Murmur] : no murmur heard [No Edema] : there was no peripheral edema [Soft] : abdomen soft [Non Tender] : non-tender [No HSM] : no HSM [Normal Supraclavicular Nodes] : no supraclavicular lymphadenopathy [Normal Posterior Cervical Nodes] : no posterior cervical lymphadenopathy [No CVA Tenderness] : no CVA  tenderness [Normal Anterior Cervical Nodes] : no anterior cervical lymphadenopathy [No Joint Swelling] : no joint swelling [No Spinal Tenderness] : no spinal tenderness [Grossly Normal Strength/Tone] : grossly normal strength/tone [No Rash] : no rash [Normal Gait] : normal gait [No Focal Deficits] : no focal deficits [Normal Affect] : the affect was normal [Alert and Oriented x3] : oriented to person, place, and time [Normal TMs] : both tympanic membranes were normal [de-identified] : scattered freckles

## 2019-08-30 NOTE — HISTORY OF PRESENT ILLNESS
[Health Maintenance] : health maintenance [Fair] : fair [Reg. Dental Visits] : She has regular dental visits [Postmenopausal] : the patient is postmenopausal [Spouse] : spouse [] :  [Occupation ___] : occupation: [unfilled] [Working Full Time] : working full time [Former Cigarette Smoker] : is a former cigarette smoker [Quit Cigarettes: ___] : quit smoking [unfilled] [Occasional Use] : occasional alcohol use [Never] : has never used illicit drugs [Healthy Diet] : She consumes a diverse and healthy diet [FreeTextEntry1] : CPE [Binge Drinking] : denies binge drinking [Patient Concern] : no personal concern about alcohol use [Family Concern] : no family concern about alcohol use [Vision Problems] : She denies vision problems [Hearing Loss] : She denies hearing loss [de-identified] : 8/18 [Regular Exercise] : She does not exercise regularly [de-identified] : hx 1/2 ppd x x 20 yrs [de-identified] : hx flu shot 9/18, hx Tdap 8/18, hx hep B series, no hx shingles vaccine [de-identified] : \par 55 yo F pmhx HTN, HLD, preDM, hx ARAGON, MCTD, necrotizing myopathy, vit d insuff, B12 def here for CPE\par \par Feeling well.\par Needs med refills.\par Not fasting- wants lab slip, [facility] per rheum\par \par \par c/o CP x 2 mo, sharp at left chest, onset at rest, no worse with exertion\par -lasts seconds resolves on own\par -not a/w dizziness, sob or palpitations.  Denies relation to stress level or heartburn.\par \par Reports increased 2/2 sick mom s/p in/out hospital/[facility] since 6/16\par -denies depression/anxiety\par -has good supportive\par \par preDM, HLD-\par -cut down on fats/carbs \par -staying active at work and with daily activities, doing PT\par \par hx MCTD, necrotizing myopathy- saw rheum today, feels joint pains and gen aching getting worse - to start CellCept bid tomorrow, Naprosyn increased to 500 mg bid\par -to f/u 10/18 for re-eval re: ? 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  Advised pulm eval\par -seen by pulm 7/18,had cardiopulm testing 10/18- states called and told was "age appropriate" and advised increased exercise only.  office f/u pending.\par -denies cough, fever, chills, dizziness, leg edema or palpitations.\par \par Reports nl appetite, has nl BM on Metamucil prn\par Denies GI complaints.\par \par hx B12 def, vit d def-\par -on OTC supplements\par \par Denies hx STD dx.\par -denies  complaints. \par \par States did mammo today per GYN- results pending, US pending  in 2 weeks

## 2019-09-03 ENCOUNTER — TRANSCRIPTION ENCOUNTER (OUTPATIENT)
Age: 54
End: 2019-09-03

## 2019-09-16 ENCOUNTER — TRANSCRIPTION ENCOUNTER (OUTPATIENT)
Age: 54
End: 2019-09-16

## 2019-09-23 ENCOUNTER — OUTPATIENT (OUTPATIENT)
Dept: OUTPATIENT SERVICES | Facility: HOSPITAL | Age: 54
LOS: 1 days | End: 2019-09-23
Payer: COMMERCIAL

## 2019-09-23 ENCOUNTER — APPOINTMENT (OUTPATIENT)
Dept: CV DIAGNOSITCS | Facility: HOSPITAL | Age: 54
End: 2019-09-23

## 2019-09-23 DIAGNOSIS — Z98.890 OTHER SPECIFIED POSTPROCEDURAL STATES: Chronic | ICD-10-CM

## 2019-09-23 DIAGNOSIS — Z98.51 TUBAL LIGATION STATUS: Chronic | ICD-10-CM

## 2019-09-23 DIAGNOSIS — Z98.89 OTHER SPECIFIED POSTPROCEDURAL STATES: Chronic | ICD-10-CM

## 2019-09-23 DIAGNOSIS — Z00.00 ENCOUNTER FOR GENERAL ADULT MEDICAL EXAMINATION WITHOUT ABNORMAL FINDINGS: ICD-10-CM

## 2019-09-23 PROCEDURE — 93306 TTE W/DOPPLER COMPLETE: CPT | Mod: 26

## 2019-09-23 PROCEDURE — 93306 TTE W/DOPPLER COMPLETE: CPT

## 2019-09-24 ENCOUNTER — TRANSCRIPTION ENCOUNTER (OUTPATIENT)
Age: 54
End: 2019-09-24

## 2019-09-24 ENCOUNTER — RX RENEWAL (OUTPATIENT)
Age: 54
End: 2019-09-24

## 2019-09-25 ENCOUNTER — TRANSCRIPTION ENCOUNTER (OUTPATIENT)
Age: 54
End: 2019-09-25

## 2019-10-03 ENCOUNTER — TRANSCRIPTION ENCOUNTER (OUTPATIENT)
Age: 54
End: 2019-10-03

## 2019-10-15 ENCOUNTER — TRANSCRIPTION ENCOUNTER (OUTPATIENT)
Age: 54
End: 2019-10-15

## 2019-10-16 ENCOUNTER — APPOINTMENT (OUTPATIENT)
Dept: RHEUMATOLOGY | Facility: CLINIC | Age: 54
End: 2019-10-16
Payer: COMMERCIAL

## 2019-10-16 ENCOUNTER — LABORATORY RESULT (OUTPATIENT)
Age: 54
End: 2019-10-16

## 2019-10-16 VITALS
OXYGEN SATURATION: 98 % | HEART RATE: 54 BPM | WEIGHT: 154 LBS | TEMPERATURE: 97.9 F | DIASTOLIC BLOOD PRESSURE: 86 MMHG | BODY MASS INDEX: 24.86 KG/M2 | SYSTOLIC BLOOD PRESSURE: 139 MMHG

## 2019-10-16 PROCEDURE — G0008: CPT

## 2019-10-16 PROCEDURE — 99215 OFFICE O/P EST HI 40 MIN: CPT | Mod: 25

## 2019-10-16 PROCEDURE — 90674 CCIIV4 VAC NO PRSV 0.5 ML IM: CPT

## 2019-10-16 RX ORDER — NAPROXEN 500 MG/1
500 TABLET, DELAYED RELEASE ORAL
Qty: 180 | Refills: 0 | Status: DISCONTINUED | COMMUNITY
Start: 2019-07-05 | End: 2019-10-16

## 2019-10-17 ENCOUNTER — APPOINTMENT (OUTPATIENT)
Dept: RHEUMATOLOGY | Facility: CLINIC | Age: 54
End: 2019-10-17

## 2019-10-17 ENCOUNTER — CLINICAL ADVICE (OUTPATIENT)
Age: 54
End: 2019-10-17

## 2019-10-18 ENCOUNTER — TRANSCRIPTION ENCOUNTER (OUTPATIENT)
Age: 54
End: 2019-10-18

## 2019-10-18 LAB
ALBUMIN SERPL ELPH-MCNC: 4.7 G/DL
ALP BLD-CCNC: 67 U/L
ALT SERPL-CCNC: 10 U/L
ANION GAP SERPL CALC-SCNC: 18 MMOL/L
APPEARANCE: CLEAR
AST SERPL-CCNC: 16 U/L
BACTERIA: ABNORMAL
BASOPHILS # BLD AUTO: 0.03 K/UL
BASOPHILS NFR BLD AUTO: 0.3 %
BILIRUB SERPL-MCNC: 0.5 MG/DL
BILIRUBIN URINE: NEGATIVE
BLOOD URINE: NEGATIVE
BUN SERPL-MCNC: 17 MG/DL
C TRACH RRNA SPEC QL NAA+PROBE: NOT DETECTED
C3 SERPL-MCNC: 131 MG/DL
C4 SERPL-MCNC: 50 MG/DL
CALCIUM SERPL-MCNC: 10.1 MG/DL
CELLS.CD3-CD19+/CELLS IN BLOOD: <1 %
CHLORIDE SERPL-SCNC: 104 MMOL/L
CK SERPL-CCNC: 173 U/L
CO2 SERPL-SCNC: 22 MMOL/L
COLOR: YELLOW
CREAT SERPL-MCNC: 0.57 MG/DL
CREAT SPEC-SCNC: 34 MG/DL
CREAT/PROT UR: 0.2 RATIO
DSDNA AB SER-ACNC: <12 IU/ML
EOSINOPHIL # BLD AUTO: 0.03 K/UL
EOSINOPHIL NFR BLD AUTO: 0.3 %
ERYTHROCYTE [SEDIMENTATION RATE] IN BLOOD BY WESTERGREN METHOD: 40 MM/HR
GLUCOSE QUALITATIVE U: NEGATIVE
GLUCOSE SERPL-MCNC: 83 MG/DL
HCT VFR BLD CALC: 42.3 %
HGB BLD-MCNC: 13.5 G/DL
HYALINE CASTS: 2 /LPF
IGA SER QL IEP: 194 MG/DL
IGG SER QL IEP: 1377 MG/DL
IGM SER QL IEP: 64 MG/DL
IMM GRANULOCYTES NFR BLD AUTO: 0.5 %
KETONES URINE: NEGATIVE
LEUKOCYTE ESTERASE URINE: NEGATIVE
LYMPHOCYTES # BLD AUTO: 2.17 K/UL
LYMPHOCYTES NFR BLD AUTO: 23.7 %
MAN DIFF?: NORMAL
MCHC RBC-ENTMCNC: 27.8 PG
MCHC RBC-ENTMCNC: 31.9 GM/DL
MCV RBC AUTO: 87 FL
MICROSCOPIC-UA: NORMAL
MONOCYTES # BLD AUTO: 0.72 K/UL
MONOCYTES NFR BLD AUTO: 7.9 %
N GONORRHOEA RRNA SPEC QL NAA+PROBE: NOT DETECTED
NEUTROPHILS # BLD AUTO: 6.14 K/UL
NEUTROPHILS NFR BLD AUTO: 67.3 %
NITRITE URINE: NEGATIVE
PH URINE: 6.5
PLATELET # BLD AUTO: NORMAL K/UL
POTASSIUM SERPL-SCNC: 4.1 MMOL/L
PROT SERPL-MCNC: 7.5 G/DL
PROT UR-MCNC: 5 MG/DL
PROTEIN URINE: NEGATIVE
RBC # BLD: 4.86 M/UL
RBC # FLD: 14.9 %
RED BLOOD CELLS URINE: 0 /HPF
SODIUM SERPL-SCNC: 144 MMOL/L
SOURCE AMPLIFICATION: NORMAL
SPECIFIC GRAVITY URINE: 1.01
SQUAMOUS EPITHELIAL CELLS: 10 /HPF
UROBILINOGEN URINE: NORMAL
WBC # FLD AUTO: 9.14 K/UL
WHITE BLOOD CELLS URINE: 12 /HPF

## 2019-10-24 ENCOUNTER — CHART COPY (OUTPATIENT)
Age: 54
End: 2019-10-24

## 2019-11-04 ENCOUNTER — TRANSCRIPTION ENCOUNTER (OUTPATIENT)
Age: 54
End: 2019-11-04

## 2019-11-05 ENCOUNTER — TRANSCRIPTION ENCOUNTER (OUTPATIENT)
Age: 54
End: 2019-11-05

## 2019-11-06 ENCOUNTER — TRANSCRIPTION ENCOUNTER (OUTPATIENT)
Age: 54
End: 2019-11-06

## 2019-11-21 ENCOUNTER — RX RENEWAL (OUTPATIENT)
Age: 54
End: 2019-11-21

## 2019-12-04 ENCOUNTER — APPOINTMENT (OUTPATIENT)
Dept: INTERNAL MEDICINE | Facility: CLINIC | Age: 54
End: 2019-12-04

## 2019-12-04 ENCOUNTER — FORM ENCOUNTER (OUTPATIENT)
Age: 54
End: 2019-12-04

## 2019-12-05 ENCOUNTER — LABORATORY RESULT (OUTPATIENT)
Age: 54
End: 2019-12-05

## 2019-12-05 ENCOUNTER — APPOINTMENT (OUTPATIENT)
Dept: RHEUMATOLOGY | Facility: CLINIC | Age: 54
End: 2019-12-05
Payer: COMMERCIAL

## 2019-12-05 ENCOUNTER — APPOINTMENT (OUTPATIENT)
Dept: ORTHOPEDIC SURGERY | Facility: CLINIC | Age: 54
End: 2019-12-05
Payer: COMMERCIAL

## 2019-12-05 VITALS
OXYGEN SATURATION: 97 % | WEIGHT: 156 LBS | DIASTOLIC BLOOD PRESSURE: 88 MMHG | SYSTOLIC BLOOD PRESSURE: 164 MMHG | BODY MASS INDEX: 25.07 KG/M2 | HEIGHT: 66 IN | HEART RATE: 59 BPM | TEMPERATURE: 97.8 F

## 2019-12-05 PROCEDURE — G0009: CPT

## 2019-12-05 PROCEDURE — 99203 OFFICE O/P NEW LOW 30 MIN: CPT | Mod: 57

## 2019-12-05 PROCEDURE — 99215 OFFICE O/P EST HI 40 MIN: CPT | Mod: 25

## 2019-12-05 PROCEDURE — 90670 PCV13 VACCINE IM: CPT

## 2019-12-05 PROCEDURE — 73630 X-RAY EXAM OF FOOT: CPT | Mod: RT

## 2019-12-05 PROCEDURE — 28470 CLTX METATARSAL FX WO MNP EA: CPT | Mod: RT

## 2019-12-05 RX ORDER — INDOMETHACIN 75 MG/1
75 CAPSULE, EXTENDED RELEASE ORAL TWICE DAILY
Qty: 14 | Refills: 0 | Status: DISCONTINUED | COMMUNITY
Start: 2019-10-16 | End: 2019-12-05

## 2019-12-06 ENCOUNTER — TRANSCRIPTION ENCOUNTER (OUTPATIENT)
Age: 54
End: 2019-12-06

## 2019-12-06 LAB
25(OH)D3 SERPL-MCNC: 40.3 NG/ML
ALBUMIN SERPL ELPH-MCNC: 4.5 G/DL
ALP BLD-CCNC: 73 U/L
ALT SERPL-CCNC: 21 U/L
ANION GAP SERPL CALC-SCNC: 14 MMOL/L
APPEARANCE: CLEAR
AST SERPL-CCNC: 19 U/L
BACTERIA: NEGATIVE
BASOPHILS # BLD AUTO: 0.04 K/UL
BASOPHILS NFR BLD AUTO: 0.6 %
BILIRUB SERPL-MCNC: 0.6 MG/DL
BILIRUBIN URINE: NEGATIVE
BLOOD URINE: NEGATIVE
BUN SERPL-MCNC: 11 MG/DL
C3 SERPL-MCNC: 119 MG/DL
C4 SERPL-MCNC: 43 MG/DL
CALCIUM SERPL-MCNC: 9.9 MG/DL
CELLS.CD3-CD19+/CELLS IN BLOOD: <1 %
CHLORIDE SERPL-SCNC: 104 MMOL/L
CO2 SERPL-SCNC: 26 MMOL/L
COLOR: NORMAL
CREAT SERPL-MCNC: 0.63 MG/DL
CREAT SPEC-SCNC: 46 MG/DL
CREAT/PROT UR: 0.1 RATIO
CRP SERPL-MCNC: 0.25 MG/DL
EOSINOPHIL # BLD AUTO: 0.06 K/UL
EOSINOPHIL NFR BLD AUTO: 0.8 %
ERYTHROCYTE [SEDIMENTATION RATE] IN BLOOD BY WESTERGREN METHOD: 31 MM/HR
GLUCOSE QUALITATIVE U: NEGATIVE
GLUCOSE SERPL-MCNC: 88 MG/DL
HCT VFR BLD CALC: 41.5 %
HGB BLD-MCNC: 13.3 G/DL
HYALINE CASTS: 0 /LPF
IGA SER QL IEP: 192 MG/DL
IGG SER QL IEP: 1204 MG/DL
IGM SER QL IEP: 60 MG/DL
IMM GRANULOCYTES NFR BLD AUTO: 0.1 %
KETONES URINE: NEGATIVE
LEUKOCYTE ESTERASE URINE: NEGATIVE
LYMPHOCYTES # BLD AUTO: 2.04 K/UL
LYMPHOCYTES NFR BLD AUTO: 28.5 %
MAN DIFF?: NORMAL
MCHC RBC-ENTMCNC: 27.9 PG
MCHC RBC-ENTMCNC: 32 GM/DL
MCV RBC AUTO: 87.2 FL
MICROSCOPIC-UA: NORMAL
MONOCYTES # BLD AUTO: 0.54 K/UL
MONOCYTES NFR BLD AUTO: 7.6 %
NEUTROPHILS # BLD AUTO: 4.46 K/UL
NEUTROPHILS NFR BLD AUTO: 62.4 %
NITRITE URINE: NEGATIVE
PH URINE: 6.5
PLATELET # BLD AUTO: NORMAL K/UL
POTASSIUM SERPL-SCNC: 4 MMOL/L
PROT SERPL-MCNC: 7.1 G/DL
PROT UR-MCNC: 6 MG/DL
PROTEIN URINE: NEGATIVE
RBC # BLD: 4.76 M/UL
RBC # FLD: 15.4 %
RED BLOOD CELLS URINE: 1 /HPF
SODIUM SERPL-SCNC: 143 MMOL/L
SPECIFIC GRAVITY URINE: 1.01
SQUAMOUS EPITHELIAL CELLS: 3 /HPF
UROBILINOGEN URINE: NORMAL
WBC # FLD AUTO: 7.15 K/UL
WHITE BLOOD CELLS URINE: 1 /HPF

## 2019-12-16 ENCOUNTER — APPOINTMENT (OUTPATIENT)
Dept: ORTHOPEDIC SURGERY | Facility: CLINIC | Age: 54
End: 2019-12-16
Payer: COMMERCIAL

## 2019-12-16 PROCEDURE — 99024 POSTOP FOLLOW-UP VISIT: CPT

## 2019-12-16 PROCEDURE — 73630 X-RAY EXAM OF FOOT: CPT | Mod: RT

## 2019-12-23 ENCOUNTER — TRANSCRIPTION ENCOUNTER (OUTPATIENT)
Age: 54
End: 2019-12-23

## 2019-12-30 ENCOUNTER — FORM ENCOUNTER (OUTPATIENT)
Age: 54
End: 2019-12-30

## 2019-12-31 ENCOUNTER — OUTPATIENT (OUTPATIENT)
Dept: OUTPATIENT SERVICES | Facility: HOSPITAL | Age: 54
LOS: 1 days | End: 2019-12-31
Payer: COMMERCIAL

## 2019-12-31 ENCOUNTER — APPOINTMENT (OUTPATIENT)
Dept: RADIOLOGY | Facility: CLINIC | Age: 54
End: 2019-12-31
Payer: COMMERCIAL

## 2019-12-31 DIAGNOSIS — Z98.89 OTHER SPECIFIED POSTPROCEDURAL STATES: Chronic | ICD-10-CM

## 2019-12-31 DIAGNOSIS — Z98.890 OTHER SPECIFIED POSTPROCEDURAL STATES: Chronic | ICD-10-CM

## 2019-12-31 DIAGNOSIS — Z98.51 TUBAL LIGATION STATUS: Chronic | ICD-10-CM

## 2019-12-31 DIAGNOSIS — Z00.8 ENCOUNTER FOR OTHER GENERAL EXAMINATION: ICD-10-CM

## 2019-12-31 PROCEDURE — 77080 DXA BONE DENSITY AXIAL: CPT | Mod: 26

## 2019-12-31 PROCEDURE — 77080 DXA BONE DENSITY AXIAL: CPT

## 2020-01-02 ENCOUNTER — TRANSCRIPTION ENCOUNTER (OUTPATIENT)
Age: 55
End: 2020-01-02

## 2020-01-03 ENCOUNTER — TRANSCRIPTION ENCOUNTER (OUTPATIENT)
Age: 55
End: 2020-01-03

## 2020-01-08 ENCOUNTER — TRANSCRIPTION ENCOUNTER (OUTPATIENT)
Age: 55
End: 2020-01-08

## 2020-01-09 ENCOUNTER — TRANSCRIPTION ENCOUNTER (OUTPATIENT)
Age: 55
End: 2020-01-09

## 2020-01-13 ENCOUNTER — APPOINTMENT (OUTPATIENT)
Dept: ORTHOPEDIC SURGERY | Facility: CLINIC | Age: 55
End: 2020-01-13
Payer: COMMERCIAL

## 2020-01-13 ENCOUNTER — APPOINTMENT (OUTPATIENT)
Dept: ORTHOPEDIC SURGERY | Facility: CLINIC | Age: 55
End: 2020-01-13

## 2020-01-13 VITALS — WEIGHT: 156 LBS | HEIGHT: 66 IN | BODY MASS INDEX: 25.07 KG/M2

## 2020-01-13 PROCEDURE — 73630 X-RAY EXAM OF FOOT: CPT | Mod: RT

## 2020-01-13 PROCEDURE — 99213 OFFICE O/P EST LOW 20 MIN: CPT

## 2020-01-13 NOTE — DISCUSSION/SUMMARY
[de-identified] : I discussed the underlying pathophysiology of the patient's condition in great detail with the patient. I went over the patient's x-rays with them in great detail.   The use of ice and rest was reviewed with the patient. I recommend the patient attend PT to further increase their strength and mobility.  At-home strengthening exercises were discussed and demonstrated in great detail with the patient. Needs to avoid high-impact activities such as running and jumping. I recommend alternate activities such as yoga, Pilates, barre classes, toning classes, and riding a stationary bike on low tension.

## 2020-01-13 NOTE — HISTORY OF PRESENT ILLNESS
[de-identified] : 54 year old female presents with healing distal metatarsal fracture. She is doing well and improving and can bear weight at this time. She has been wearing a boot.  She feels the boot is very uncomfortable and would like to get out of it.  She is about 7 months status for post distal third fifth metatarsal fracture of the right foot

## 2020-01-13 NOTE — ADDENDUM
[FreeTextEntry1] : I, Bari Marks, acted solely as a scribe for Dr. Clinton on 01/13/2020  .\par  \par All medical record entries made by the scribe were at my, Dr. Clinton, direction and personally dictated by me on 01/13/2020. I have reviewed the chart and agree that the record accurately reflects my personal performance of the history, physical exam, assessment and plan. I have also personally directed, reviewed, and agreed with the chart.

## 2020-01-14 ENCOUNTER — TRANSCRIPTION ENCOUNTER (OUTPATIENT)
Age: 55
End: 2020-01-14

## 2020-01-22 ENCOUNTER — APPOINTMENT (OUTPATIENT)
Dept: ORTHOPEDIC SURGERY | Facility: CLINIC | Age: 55
End: 2020-01-22
Payer: COMMERCIAL

## 2020-01-27 ENCOUNTER — TRANSCRIPTION ENCOUNTER (OUTPATIENT)
Age: 55
End: 2020-01-27

## 2020-01-28 ENCOUNTER — APPOINTMENT (OUTPATIENT)
Dept: ORTHOPEDIC SURGERY | Facility: CLINIC | Age: 55
End: 2020-01-28
Payer: COMMERCIAL

## 2020-01-28 PROCEDURE — 73630 X-RAY EXAM OF FOOT: CPT | Mod: RT

## 2020-01-28 PROCEDURE — 99024 POSTOP FOLLOW-UP VISIT: CPT

## 2020-02-10 ENCOUNTER — APPOINTMENT (OUTPATIENT)
Dept: ORTHOPEDIC SURGERY | Facility: CLINIC | Age: 55
End: 2020-02-10

## 2020-03-06 ENCOUNTER — LABORATORY RESULT (OUTPATIENT)
Age: 55
End: 2020-03-06

## 2020-03-06 ENCOUNTER — APPOINTMENT (OUTPATIENT)
Dept: RHEUMATOLOGY | Facility: CLINIC | Age: 55
End: 2020-03-06
Payer: COMMERCIAL

## 2020-03-06 VITALS
BODY MASS INDEX: 25.39 KG/M2 | OXYGEN SATURATION: 97 % | HEART RATE: 58 BPM | DIASTOLIC BLOOD PRESSURE: 78 MMHG | SYSTOLIC BLOOD PRESSURE: 139 MMHG | HEIGHT: 66 IN | TEMPERATURE: 97.9 F | WEIGHT: 158 LBS

## 2020-03-06 PROCEDURE — 99215 OFFICE O/P EST HI 40 MIN: CPT

## 2020-03-06 RX ORDER — CLINDAMYCIN HYDROCHLORIDE 300 MG/1
300 CAPSULE ORAL EVERY 6 HOURS
Qty: 56 | Refills: 0 | Status: DISCONTINUED | COMMUNITY
Start: 2019-10-16 | End: 2020-03-06

## 2020-03-06 RX ORDER — RITUXIMAB 10 MG/ML
100 INJECTION, SOLUTION INTRAVENOUS
Qty: 90 | Refills: 0 | Status: DISCONTINUED | OUTPATIENT
Start: 2017-08-11 | End: 2020-03-06

## 2020-03-06 RX ORDER — FLUCONAZOLE 150 MG/1
150 TABLET ORAL
Qty: 1 | Refills: 0 | Status: DISCONTINUED | COMMUNITY
Start: 2019-10-16 | End: 2020-03-06

## 2020-03-06 RX ORDER — MELOXICAM 7.5 MG/1
7.5 TABLET ORAL
Qty: 180 | Refills: 0 | Status: DISCONTINUED | COMMUNITY
Start: 2019-10-16 | End: 2020-03-06

## 2020-03-06 RX ORDER — UBIDECARENONE/VIT E ACET 100MG-5
50 MCG CAPSULE ORAL
Qty: 90 | Refills: 2 | Status: DISCONTINUED | COMMUNITY
Start: 2017-01-05 | End: 2020-03-06

## 2020-03-06 NOTE — HISTORY OF PRESENT ILLNESS
[FreeTextEntry1] : 55 year old female here today for follow up for necrotizing myopathy + SRP, elevated CK (2000s) + RF + RNP\par MCTD/necrotizing myopathy: \par On CellCept d/c in april 2019\par rituxan on 4-23-19 and 5-16-19\par unable to tolerate IVIg due to serum sickness\par \par 1)  New;  stumbling and veering when she walks, intermittent headaches. \par 2)  muscle stiffness and joint  achiness improved with current regimen but persistent at low level and working with physical therapy [facility] and does exercises at home in between. \par 3) Rash - no active rash\par 4)  SOB:  onset in May 2018, no cough,  - she has seen pulmonary and cardiology and had cardiopulm stress test. no recent shortness of breath other than when she is anxious.  No shortness of breath\par 5)  borderline pulm htn = last echo 9-2019 - borderline pulm htn.  no further shortness of breath. \par 6)  right shoulder pain - structural and continuing with physical therapy but she still hears a click. pain is better. \par 8)  right foot pain  and working with orthopedics and physical therapy.  improving.

## 2020-03-06 NOTE — REASON FOR VISIT
[Follow-Up: _____] : a [unfilled] follow-up visit [FreeTextEntry1] :  necrotizing myopathy + SRP, elevated CK (2000s) + RF + RNP, new inbalance

## 2020-03-06 NOTE — DATA REVIEWED
[FreeTextEntry1] : Laboratory and radiology studies that were personally reviewed at today's visit are summarized below:\par \par 12-5-2020:  CD19 < 1\par \par \par Echo 9-23-19: pasp = 40 \par 10-16-19:  Prot/cr = 0.2, CK = 173 , CD19 = < 1%\par \par \par 5-2018  CT Chest:  LUNGS AND LARGE AIRWAYS: Patent central airways. No pulmonary nodules. \par Minimal bibasilar atelectasis. \par PLEURA: No pleural effusion. \par VESSELS: Adequate opacification of the pulmonary arteries. No pulmonary \par embolus. \par HEART: Heart size is normal. No pericardial effusion. \par MEDIASTINUM AND NEHEMIAS: No lymphadenopathy. \par CHEST WALL AND LOWER NECK: Within normal limits. \par VISUALIZED UPPER ABDOMEN: Within normal limits. \par BONES: Multilevel degenerative changes of the spine. \par \par IMPRESSION: \par \par No pulmonary embolus. \par

## 2020-03-06 NOTE — ASSESSMENT
[FreeTextEntry1] : 55 year old female with 6+ years of weakness and muscle tenderness and aching. \par positive anti SRP, + RF, + RNP.  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 \par cellcept d/c in april 2019 due to initiation of rituxan and restarted in fall 2019\par rtx 4/23/19 and 5/16/19\par \par Imbalance and veering ot one side\par [] check head CT to look for parenchymal abnormality \par [] in setting of myositis and of immunosuppression  there is always an increased concern for cancer so the brain CT is necessary. \par \par Myositis \par [] currently on cellcept 1000 mg BID and will continue currnet dose. \par [] Will check laboratory tests today  to look for markers of disease activity and also to assess for medication toxicity today. \par [] rtc in 12 weeks and if not improved will consider re-dosing rituxan \par [] continue physical therapy - she will let me know if she needs new prescripton\par \par Neuropathy/joint pain :\par []  continue 400 mg QiD \par \par RTC disease  - improved. \par \par right foot pain \par []  followup orthopedics . \par \par HCM - Flu shot given oct 2019 prevnar in dec 2019 and pneumovax today \par DEXA reviewed and normla - check again in 12 -2021\par [] calcium total 1200 mg\par [] recheck vitamin d \par \par rtc in 12 weeks. \par \par Risks of, benefits of and alternatives to this treatment plan discussed with patient and patient expressed understanding.

## 2020-03-09 ENCOUNTER — TRANSCRIPTION ENCOUNTER (OUTPATIENT)
Age: 55
End: 2020-03-09

## 2020-03-09 LAB
25(OH)D3 SERPL-MCNC: 42 NG/ML
ALBUMIN SERPL ELPH-MCNC: 4.6 G/DL
ALP BLD-CCNC: 70 U/L
ALT SERPL-CCNC: 15 U/L
ANION GAP SERPL CALC-SCNC: 16 MMOL/L
APPEARANCE: CLEAR
AST SERPL-CCNC: 15 U/L
BACTERIA: NEGATIVE
BASOPHILS # BLD AUTO: 0.03 K/UL
BASOPHILS NFR BLD AUTO: 0.5 %
BILIRUB SERPL-MCNC: 0.6 MG/DL
BILIRUBIN URINE: NEGATIVE
BLOOD URINE: NEGATIVE
BUN SERPL-MCNC: 15 MG/DL
C3 SERPL-MCNC: 117 MG/DL
C4 SERPL-MCNC: 47 MG/DL
CALCIUM SERPL-MCNC: 9.9 MG/DL
CELLS.CD3-CD19+/CELLS IN BLOOD: 1 %
CHLORIDE SERPL-SCNC: 103 MMOL/L
CK SERPL-CCNC: 191 U/L
CO2 SERPL-SCNC: 24 MMOL/L
COLOR: YELLOW
CREAT SERPL-MCNC: 0.62 MG/DL
CREAT SPEC-SCNC: 163 MG/DL
CREAT/PROT UR: 0.1 RATIO
CRP SERPL-MCNC: 0.21 MG/DL
DSDNA AB SER-ACNC: <12 IU/ML
EOSINOPHIL # BLD AUTO: 0.05 K/UL
EOSINOPHIL NFR BLD AUTO: 0.8 %
ERYTHROCYTE [SEDIMENTATION RATE] IN BLOOD BY WESTERGREN METHOD: 52 MM/HR
GLUCOSE QUALITATIVE U: NEGATIVE
GLUCOSE SERPL-MCNC: 99 MG/DL
HCT VFR BLD CALC: 42.3 %
HGB BLD-MCNC: 13.7 G/DL
HYALINE CASTS: 2 /LPF
IGA SER QL IEP: 190 MG/DL
IGG SER QL IEP: 1218 MG/DL
IGM SER QL IEP: 55 MG/DL
IMM GRANULOCYTES NFR BLD AUTO: 0.2 %
KETONES URINE: NEGATIVE
LEUKOCYTE ESTERASE URINE: ABNORMAL
LYMPHOCYTES # BLD AUTO: 1.81 K/UL
LYMPHOCYTES NFR BLD AUTO: 29.4 %
MAN DIFF?: NORMAL
MCHC RBC-ENTMCNC: 28.2 PG
MCHC RBC-ENTMCNC: 32.4 GM/DL
MCV RBC AUTO: 87 FL
MICROSCOPIC-UA: NORMAL
MONOCYTES # BLD AUTO: 0.43 K/UL
MONOCYTES NFR BLD AUTO: 7 %
NEUTROPHILS # BLD AUTO: 3.83 K/UL
NEUTROPHILS NFR BLD AUTO: 62.1 %
NITRITE URINE: NEGATIVE
PH URINE: 5.5
PLATELET # BLD AUTO: NORMAL K/UL
POTASSIUM SERPL-SCNC: 3.7 MMOL/L
PROT SERPL-MCNC: 7.2 G/DL
PROT UR-MCNC: 11 MG/DL
PROTEIN URINE: NORMAL
RBC # BLD: 4.86 M/UL
RBC # FLD: 15.2 %
RED BLOOD CELLS URINE: 3 /HPF
SODIUM SERPL-SCNC: 143 MMOL/L
SPECIFIC GRAVITY URINE: 1.03
SQUAMOUS EPITHELIAL CELLS: 8 /HPF
UROBILINOGEN URINE: NORMAL
WBC # FLD AUTO: 6.16 K/UL
WHITE BLOOD CELLS URINE: 8 /HPF

## 2020-03-17 ENCOUNTER — APPOINTMENT (OUTPATIENT)
Dept: CT IMAGING | Facility: CLINIC | Age: 55
End: 2020-03-17
Payer: COMMERCIAL

## 2020-03-17 ENCOUNTER — RESULT REVIEW (OUTPATIENT)
Age: 55
End: 2020-03-17

## 2020-03-17 ENCOUNTER — OUTPATIENT (OUTPATIENT)
Dept: OUTPATIENT SERVICES | Facility: HOSPITAL | Age: 55
LOS: 1 days | End: 2020-03-17
Payer: COMMERCIAL

## 2020-03-17 ENCOUNTER — APPOINTMENT (OUTPATIENT)
Dept: ORTHOPEDIC SURGERY | Facility: CLINIC | Age: 55
End: 2020-03-17
Payer: COMMERCIAL

## 2020-03-17 ENCOUNTER — TRANSCRIPTION ENCOUNTER (OUTPATIENT)
Age: 55
End: 2020-03-17

## 2020-03-17 DIAGNOSIS — Z98.890 OTHER SPECIFIED POSTPROCEDURAL STATES: Chronic | ICD-10-CM

## 2020-03-17 DIAGNOSIS — Z98.89 OTHER SPECIFIED POSTPROCEDURAL STATES: Chronic | ICD-10-CM

## 2020-03-17 DIAGNOSIS — Z00.8 ENCOUNTER FOR OTHER GENERAL EXAMINATION: ICD-10-CM

## 2020-03-17 DIAGNOSIS — Z98.51 TUBAL LIGATION STATUS: Chronic | ICD-10-CM

## 2020-03-17 PROCEDURE — 99213 OFFICE O/P EST LOW 20 MIN: CPT

## 2020-03-17 PROCEDURE — 70450 CT HEAD/BRAIN W/O DYE: CPT | Mod: 26

## 2020-03-17 PROCEDURE — 70450 CT HEAD/BRAIN W/O DYE: CPT

## 2020-03-17 PROCEDURE — 73630 X-RAY EXAM OF FOOT: CPT | Mod: RT

## 2020-03-18 ENCOUNTER — TRANSCRIPTION ENCOUNTER (OUTPATIENT)
Age: 55
End: 2020-03-18

## 2020-04-28 ENCOUNTER — TRANSCRIPTION ENCOUNTER (OUTPATIENT)
Age: 55
End: 2020-04-28

## 2020-04-29 ENCOUNTER — TRANSCRIPTION ENCOUNTER (OUTPATIENT)
Age: 55
End: 2020-04-29

## 2020-04-29 ENCOUNTER — LABORATORY RESULT (OUTPATIENT)
Age: 55
End: 2020-04-29

## 2020-05-01 ENCOUNTER — NON-APPOINTMENT (OUTPATIENT)
Age: 55
End: 2020-05-01

## 2020-05-06 ENCOUNTER — TRANSCRIPTION ENCOUNTER (OUTPATIENT)
Age: 55
End: 2020-05-06

## 2020-05-06 LAB
ALBUMIN SERPL ELPH-MCNC: 4.8 G/DL
ALP BLD-CCNC: 64 U/L
ALT SERPL-CCNC: 14 U/L
ANION GAP SERPL CALC-SCNC: 15 MMOL/L
AST SERPL-CCNC: 17 U/L
BASOPHILS # BLD AUTO: 0.04 K/UL
BASOPHILS NFR BLD AUTO: 0.7 %
BILIRUB SERPL-MCNC: 0.7 MG/DL
BUN SERPL-MCNC: 15 MG/DL
CALCIUM SERPL-MCNC: 10 MG/DL
CELLS.CD3-CD19+/CELLS IN BLOOD: 1 %
CHLORIDE SERPL-SCNC: 101 MMOL/L
CK SERPL-CCNC: 191 U/L
CO2 SERPL-SCNC: 25 MMOL/L
CREAT SERPL-MCNC: 0.71 MG/DL
EOSINOPHIL # BLD AUTO: 0.1 K/UL
EOSINOPHIL NFR BLD AUTO: 1.7 %
HAV IGM SER QL: NONREACTIVE
HBV CORE IGG+IGM SER QL: NONREACTIVE
HBV CORE IGM SER QL: NONREACTIVE
HBV SURFACE AG SER QL: NONREACTIVE
HCT VFR BLD CALC: 42.8 %
HCV AB SER QL: NONREACTIVE
HCV S/CO RATIO: 0.2 S/CO
HGB BLD-MCNC: 13.2 G/DL
IGA SER QL IEP: 176 MG/DL
IGG SER QL IEP: 1234 MG/DL
IGM SER QL IEP: 58 MG/DL
IMM GRANULOCYTES NFR BLD AUTO: 0.2 %
LYMPHOCYTES # BLD AUTO: 1.75 K/UL
LYMPHOCYTES NFR BLD AUTO: 30.6 %
M TB IFN-G BLD-IMP: NEGATIVE
MAN DIFF?: NORMAL
MCHC RBC-ENTMCNC: 27.5 PG
MCHC RBC-ENTMCNC: 30.8 GM/DL
MCV RBC AUTO: 89.2 FL
MONOCYTES # BLD AUTO: 0.44 K/UL
MONOCYTES NFR BLD AUTO: 7.7 %
NEUTROPHILS # BLD AUTO: 3.38 K/UL
NEUTROPHILS NFR BLD AUTO: 59.1 %
PLATELET # BLD AUTO: NORMAL K/UL
POTASSIUM SERPL-SCNC: 4.4 MMOL/L
PROT SERPL-MCNC: 7.3 G/DL
QUANTIFERON TB PLUS MITOGEN MINUS NIL: 5.46 IU/ML
QUANTIFERON TB PLUS NIL: 0.01 IU/ML
QUANTIFERON TB PLUS TB1 MINUS NIL: 0.01 IU/ML
QUANTIFERON TB PLUS TB2 MINUS NIL: 0 IU/ML
RBC # BLD: 4.8 M/UL
RBC # FLD: 14.8 %
SODIUM SERPL-SCNC: 141 MMOL/L
WBC # FLD AUTO: 5.72 K/UL

## 2020-05-28 ENCOUNTER — TRANSCRIPTION ENCOUNTER (OUTPATIENT)
Age: 55
End: 2020-05-28

## 2020-05-28 ENCOUNTER — APPOINTMENT (OUTPATIENT)
Dept: INTERNAL MEDICINE | Facility: CLINIC | Age: 55
End: 2020-05-28
Payer: COMMERCIAL

## 2020-05-28 PROCEDURE — 99214 OFFICE O/P EST MOD 30 MIN: CPT | Mod: 95

## 2020-05-28 NOTE — ASSESSMENT
[FreeTextEntry1] : 54 yo F pmhx HTN, HLD, preDM, hx ARAGON, MCTD, necrotizing myopathy, vit d insuff, B12 def for f/u\par \par hx MCTD, necrotizing myopathy (hx muscle bx)- \par -followed by rheum, seen 3/20- noted intermittent HAs, new imbalance and veering to one side, CTH done (negative).  No med changes made, labs done and to f/u 20.\par -hx serum sickness with IVIg\par -on CellCept (restarted 2019)\par -on rituxan since \par -on neurontin 400 mg qhs since \par -on amitriptyline qhs for sleep\par -doing PT as able\par -neurology referral given and encouraged\par -advised prompt ER eval if worsened sx's of HA/imbalance or new neurologic sx's arise\par \par hx mechanical R 5th MT neck fracture 19- improving\par -followed by ortho, has f/u 20\par -PT pending\par \par HTN, atypical CP-\par -EKG   NSR @ 64 bpm, nl axis, no LVH/path Q; nonspecific T changes (no chg c/w )\par - echo: EF 69%, mild MR, borderline pulm HTN, nl LV fxn and size, mild diastolic dysfxn. \par - echo: EF 65%, nl LV fxn, mild pulm HTN, min MR, mild-mod TR.  No PFO.\par -on amlodipine- benazepril\par -on HCTZ 12.5 mg qday, adherent\par -cardio f/u advised, referral given prior- pending\par -followed by optho, hx eval  with nl dilated exam per pt\par -3/20 urine prt/crt wnl\par - cmp wnl, cbc wnl except plt clumped\par -low salt diet advised\par -advised prompt ER eval if sx's worsen\par -check cbc (in blue tube)\par \par hx ARAGON- improved per pt\par -seen by cardio , had echo. Advised pulm eval\par -see by pulm , states called and told was "age appropriate" and advised increased exercise only. office f/u pending.\par \par preDM-  A1c 5.9 (was 5.8) \par -ADA diet, exercise and wt loss advised\par -declines nutritionist referral\par -check A1c\par \par HLD-  Tchol 190 TG 54  HDL 58; ASCV risk < 7.5\par -low fat diet, exercise and wt loss advised\par -check lipids\par \par GERD, IBS/constipation, hx hemorrhoids-\par - TSH wnl\par -on omeprazole prn\par -on metamucil prn\par -on prepH prn\par -hx c-scope by Dr. Hammond in - told nl, advised repeat in 5 yrs.\par -followed by GI, asked to forward records\par \par hx vit d def-\par -3/20 vit d wnl\par -on OTC vit d 2000U/d\par \par hx B12 def-\par - B12/folate wnl\par -on oral supplement\par \par hx skin rash, itching- dx'd eczematous dermatitis by derm 3/19 with amitriptyline dose increased.  Resolved.\par -followed by derm\par -on amitriptyline\par -hx FBSE , yearly advised. Regular use of sun block for skin cancer prevention advised.\par \par stress- 2/2 mother who  , denies depression\par - referral and info for [facility] given\par -info for behavioral health crisis walk-in center given prior\par -advised to f/u if sx's worsen\par \par MISC:  Advised to cont social distancing and precautions for covid prevention.  Check covid AB screening. \par \par \par HCM\par -check urine GC/chlam screening (not done prior at [facility] as ordered)\par -hx CPE \par -hx negative PAP  by GYN per pt\par -hx negative mammo  \par -hx screening colonoscopy , rec: repeat in 5 yrs\par \par Advised to f/u in 4-6 weeks for cont'd medical care.

## 2020-05-28 NOTE — REVIEW OF SYSTEMS
[Negative] : Integumentary [FreeTextEntry9] : see HPI [de-identified] : see HPI [de-identified] : see HPI

## 2020-05-28 NOTE — DATA REVIEWED
[FreeTextEntry1] : [facility] 9/14/19\par bmp wnl\par TSH wnl\par B12/folate/Vit d wnl\par A1c 5.9 (was 5.8)\par Tchol 190 TG 54  HDL 58; ASCV risk < 7.5\par hep B surf AB + (titer 119), surf ag, core AB negative\par hep C AB negative\par HIV negative\par RPR negative\par \par 10/2/19 tele note:\par Notified by staff that screening GC testing not added on to testing done 9/19 with [facility] as requested- now too late to add on.\par -above relayed to pt, willing to resubmit specimen- new lab slip to be mailed to home (pending)\par \par

## 2020-05-28 NOTE — PHYSICAL EXAM
[Well-Appearing] : well-appearing [No Acute Distress] : no acute distress [EOMI] : extraocular movements intact [Normal Sclera/Conjunctiva] : normal sclera/conjunctiva [Grossly Normal Strength/Tone] : grossly normal strength/tone [Normal Gait] : normal gait [Normal Affect] : the affect was normal [Alert and Oriented x3] : oriented to person, place, and time

## 2020-05-28 NOTE — HISTORY OF PRESENT ILLNESS
[Medical Office: ([facility])___] : at the medical office located in  [Home] : at home, [unfilled] , at the time of the visit. [FreeTextEntry1] : f/u [de-identified] : \par As this is a telemedicine visit, no physical exam could be performed.  Diagnosis and treatment is based on symptoms provided.\par \par 54 yo F pmhx HTN, HLD, preDM, hx ARAGON, MCTD, necrotizing myopathy, vit d insuff, B12 def \par \par cc: f/u\par \par Last seen in office 8/19 for CPE and labs done.\par \par States feeling well.\par \par \par hx ortho f/u 3/20 of mechanical R 5th MT neck fracture 11/16/19- noted improving, xray done and advised to cont activity/NB shoes and f/u in 2-3 mo.  Has f/u 6/20.\par -states improving, no pain meds needed.  PT on hold due to covid.\par \par hx rheum f/u 3/20- noted intermittent HAs, new imbalance and veering to one side, CTH done (negative).  No med changes made, labs done and to f/u 6/12/20.\par -exercising regularly x 2mo: online health  qod, feels helps her joint symptoms.  PT currently on hold due to covid pandemic.\par -reports intermittent, mild right sided headaches 1-2x/wk "for a while", no pain meds taken.  Notes vague sensation of "veering to one side" on/off "for a while".  Denies associated falls, vision loss, dysarthria or focal weakness.\par -hx optho eval 1/20, thinks had nl dilated exam then\par \par Reports is socially distancing, using precautions for covid19 (N95, gloves) and has not been around and known + contacts.\par Denies fever, chills, cough, CP or sob.\par Reports nl appetite and BMs.  Denies GI complaints.\par Denies  complaints.\par \par Reports mom passed away in 4/20- was at a NH and endstage COPD.  \par -states has good social support, but occasionally gets stress/anxiety thinking about it.  Denies depression/SI or panic attacks.\par -sleep okay\par

## 2020-05-29 ENCOUNTER — TRANSCRIPTION ENCOUNTER (OUTPATIENT)
Age: 55
End: 2020-05-29

## 2020-05-29 ENCOUNTER — LABORATORY RESULT (OUTPATIENT)
Age: 55
End: 2020-05-29

## 2020-06-01 LAB
BASOPHILS # BLD AUTO: 0.04 K/UL
BASOPHILS NFR BLD AUTO: 0.7 %
C TRACH RRNA SPEC QL NAA+PROBE: NOT DETECTED
CHOLEST SERPL-MCNC: 251 MG/DL
CHOLEST/HDLC SERPL: 3.1 RATIO
EOSINOPHIL # BLD AUTO: 0.09 K/UL
EOSINOPHIL NFR BLD AUTO: 1.6 %
ESTIMATED AVERAGE GLUCOSE: 114 MG/DL
HBA1C MFR BLD HPLC: 5.6 %
HCT VFR BLD CALC: 45 %
HDLC SERPL-MCNC: 82 MG/DL
HGB BLD-MCNC: 13.6 G/DL
IMM GRANULOCYTES NFR BLD AUTO: 0.3 %
LDLC SERPL CALC-MCNC: 151 MG/DL
LYMPHOCYTES # BLD AUTO: 1.7 K/UL
LYMPHOCYTES NFR BLD AUTO: 29.7 %
MAN DIFF?: NORMAL
MCHC RBC-ENTMCNC: 27.4 PG
MCHC RBC-ENTMCNC: 30.2 GM/DL
MCV RBC AUTO: 90.5 FL
MONOCYTES # BLD AUTO: 0.5 K/UL
MONOCYTES NFR BLD AUTO: 8.7 %
N GONORRHOEA RRNA SPEC QL NAA+PROBE: NOT DETECTED
NEUTROPHILS # BLD AUTO: 3.38 K/UL
NEUTROPHILS NFR BLD AUTO: 59 %
PLATELET # BLD AUTO: NORMAL K/UL
RBC # BLD: 4.97 M/UL
RBC # FLD: 15.1 %
SARS-COV-2 IGG SERPL IA-ACNC: 0.1 INDEX
SARS-COV-2 IGG SERPL QL IA: NEGATIVE
SOURCE AMPLIFICATION: NORMAL
TRIGL SERPL-MCNC: 92 MG/DL
WBC # FLD AUTO: 5.73 K/UL

## 2020-06-04 LAB — PLATELET # PLAS AUTO: 275 K/UL

## 2020-06-11 ENCOUNTER — TRANSCRIPTION ENCOUNTER (OUTPATIENT)
Age: 55
End: 2020-06-11

## 2020-06-12 ENCOUNTER — APPOINTMENT (OUTPATIENT)
Dept: RHEUMATOLOGY | Facility: CLINIC | Age: 55
End: 2020-06-12
Payer: COMMERCIAL

## 2020-06-12 VITALS
DIASTOLIC BLOOD PRESSURE: 95 MMHG | SYSTOLIC BLOOD PRESSURE: 151 MMHG | BODY MASS INDEX: 24.11 KG/M2 | HEIGHT: 66 IN | HEART RATE: 68 BPM | RESPIRATION RATE: 16 BRPM | OXYGEN SATURATION: 98 % | WEIGHT: 150 LBS | TEMPERATURE: 97.3 F

## 2020-06-12 PROCEDURE — 99214 OFFICE O/P EST MOD 30 MIN: CPT

## 2020-06-12 RX ORDER — NAPROXEN 500 MG/1
500 TABLET, DELAYED RELEASE ORAL
Qty: 60 | Refills: 2 | Status: DISCONTINUED | COMMUNITY
Start: 2019-11-21 | End: 2020-06-12

## 2020-06-16 ENCOUNTER — APPOINTMENT (OUTPATIENT)
Dept: ORTHOPEDIC SURGERY | Facility: CLINIC | Age: 55
End: 2020-06-16
Payer: COMMERCIAL

## 2020-06-16 VITALS — TEMPERATURE: 97.8 F

## 2020-06-16 PROCEDURE — 73630 X-RAY EXAM OF FOOT: CPT | Mod: RT

## 2020-06-16 PROCEDURE — 99213 OFFICE O/P EST LOW 20 MIN: CPT

## 2020-07-01 LAB
25(OH)D3 SERPL-MCNC: 36.9 NG/ML
ALBUMIN SERPL ELPH-MCNC: 4.4 G/DL
ALP BLD-CCNC: 58 U/L
ALT SERPL-CCNC: 12 U/L
ANION GAP SERPL CALC-SCNC: 11 MMOL/L
AST SERPL-CCNC: 15 U/L
BASOPHILS # BLD AUTO: 0.05 K/UL
BASOPHILS NFR BLD AUTO: 0.9 %
BILIRUB SERPL-MCNC: 0.3 MG/DL
BUN SERPL-MCNC: 10 MG/DL
CALCIUM SERPL-MCNC: 9.3 MG/DL
CELLS.CD3-CD19+/CELLS IN BLOOD: 1 %
CHLORIDE SERPL-SCNC: 106 MMOL/L
CK SERPL-CCNC: 124 U/L
CO2 SERPL-SCNC: 27 MMOL/L
CREAT SERPL-MCNC: 0.71 MG/DL
CRP SERPL-MCNC: 0.1 MG/DL
EOSINOPHIL # BLD AUTO: 0.08 K/UL
EOSINOPHIL NFR BLD AUTO: 1.5 %
ERYTHROCYTE [SEDIMENTATION RATE] IN BLOOD BY WESTERGREN METHOD: 23 MM/HR
GLUCOSE SERPL-MCNC: 83 MG/DL
HCT VFR BLD CALC: 41.3 %
HGB BLD-MCNC: 12.7 G/DL
IGA SER QL IEP: 166 MG/DL
IGG SER QL IEP: 996 MG/DL
IGM SER QL IEP: 50 MG/DL
IMM GRANULOCYTES NFR BLD AUTO: 0.2 %
LYMPHOCYTES # BLD AUTO: 1.99 K/UL
LYMPHOCYTES NFR BLD AUTO: 36.4 %
MAN DIFF?: NORMAL
MCHC RBC-ENTMCNC: 27.5 PG
MCHC RBC-ENTMCNC: 30.8 GM/DL
MCV RBC AUTO: 89.6 FL
MONOCYTES # BLD AUTO: 0.35 K/UL
MONOCYTES NFR BLD AUTO: 6.4 %
NEUTROPHILS # BLD AUTO: 2.98 K/UL
NEUTROPHILS NFR BLD AUTO: 54.6 %
PLATELET # BLD AUTO: NORMAL K/UL
POTASSIUM SERPL-SCNC: 3.7 MMOL/L
PROT SERPL-MCNC: 6.7 G/DL
RBC # BLD: 4.61 M/UL
RBC # FLD: 15.3 %
SODIUM SERPL-SCNC: 143 MMOL/L
WBC # FLD AUTO: 5.46 K/UL

## 2020-07-17 ENCOUNTER — TRANSCRIPTION ENCOUNTER (OUTPATIENT)
Age: 55
End: 2020-07-17

## 2020-07-21 ENCOUNTER — TRANSCRIPTION ENCOUNTER (OUTPATIENT)
Age: 55
End: 2020-07-21

## 2020-07-23 ENCOUNTER — TRANSCRIPTION ENCOUNTER (OUTPATIENT)
Age: 55
End: 2020-07-23

## 2020-07-29 ENCOUNTER — APPOINTMENT (OUTPATIENT)
Dept: INTERNAL MEDICINE | Facility: CLINIC | Age: 55
End: 2020-07-29
Payer: COMMERCIAL

## 2020-07-29 VITALS
WEIGHT: 150 LBS | SYSTOLIC BLOOD PRESSURE: 140 MMHG | DIASTOLIC BLOOD PRESSURE: 74 MMHG | HEIGHT: 66 IN | OXYGEN SATURATION: 97 % | HEART RATE: 74 BPM | TEMPERATURE: 98.3 F | BODY MASS INDEX: 24.11 KG/M2

## 2020-07-29 VITALS — RESPIRATION RATE: 16 BRPM | SYSTOLIC BLOOD PRESSURE: 130 MMHG | DIASTOLIC BLOOD PRESSURE: 74 MMHG | HEART RATE: 66 BPM

## 2020-07-29 DIAGNOSIS — Z13.6 ENCOUNTER FOR SCREENING FOR CARDIOVASCULAR DISORDERS: ICD-10-CM

## 2020-07-29 DIAGNOSIS — S99.921A UNSPECIFIED INJURY OF RIGHT FOOT, INITIAL ENCOUNTER: ICD-10-CM

## 2020-07-29 DIAGNOSIS — D69.1 QUALITATIVE PLATELET DEFECTS: ICD-10-CM

## 2020-07-29 DIAGNOSIS — Z11.59 ENCOUNTER FOR SCREENING FOR OTHER VIRAL DISEASES: ICD-10-CM

## 2020-07-29 DIAGNOSIS — M21.41 FLAT FOOT [PES PLANUS] (ACQUIRED), RIGHT FOOT: ICD-10-CM

## 2020-07-29 PROCEDURE — 99214 OFFICE O/P EST MOD 30 MIN: CPT

## 2020-07-29 NOTE — HISTORY OF PRESENT ILLNESS
[FreeTextEntry1] : f/u [de-identified] : 54 yo F pmhx HTN, HLD, preDM, hx ARAGON, MCTD, necrotizing myopathy, vit d insuff, B12 def for f/u\par \par \par Last encounter via telemedicine 5/28/20 for f/u with labs ordered.\par \par Feeling well.\par Needs med refill.\par \par \par hx mechanical R 5th MT neck fracture 11/16/19- notes improved, no pain meds used\par -followed by ortho, seen 6/20 with xray done noted healing.  Advised to f/u prn.\par -no PT done due to covid pandemic, feels not needed currently\par \par hx rheum f/u 6/20- reported b/l hand pain and swelling, reported improved imbalance/HAs (hx negative CTH 3/20).  Naprosyn d/c'd and given trial of diclofenac.  MMF increased to 3 tabs BID.  Labs ordered and advised to f/u in 12 weeks.\par -joint sx's slightly better since seen by rheum with advised med changes\par -reports HAs resolved since 5/20\par -hx imbalance on/off, no falls or focal weakness.  Feels PT helps\par -doing PT q Saturday\par -neuro eval pending\par \par HTN, hx CP- notes CP resolved x many months on own\par -on med regimen, adherent\par -hx optho eval 1/20, thinks had nl dilated exam then\par -cardio eval pending, declines\par -denies cough, sob, palpitations or dizziness\par \par Reports intentionally lost weight ~ 8 lbs since 3/20\par eating healthy\par exercising regularly: online health  qod- done w/o sx's or limitations, feels helps her joint symptoms. \par \par Reports nl appetite and BMs (no recent Metamucil use).\par Denies GI complaints.\par \par Reports is socially distancing, using precautions for covid19 (N95, gloves) and has not been around and known + contacts.\par Denies fever, chills, cough, CP or sob.\par \par \par Reports mom passed away in 4/20- was at a NH and endstage COPD.  States is coping well, has co-worker that confides in\par -denies depression, anxiety, HI/SI or panic attacks.\par -sleep okay\par \par Denies  complaints.

## 2020-07-29 NOTE — ASSESSMENT
[FreeTextEntry1] : 56 yo F pmhx HTN, HLD, preDM, hx ARAGON, MCTD, necrotizing myopathy, vit d insuff, B12 def for f/u\par \par \par hx MCTD, necrotizing myopathy (hx muscle bx), hx imbalance (improved)/ /HAs (resolved)- \par -followed by rheum, seen - reported b/l hand pain and swelling, naprosyn d/c'd and given trial of diclofenac. MMF increased to 3 tabs BID. Labs ordered.  Has f/u , labs planned  per pt.\par -hx serum sickness with IVIg, avoiding during covid pandemic as high risk with use per pt\par -on CellCept (restarted 2019)\par -on rituxan since \par -on neurontin 400 mg qhs since \par -on amitriptyline qhs for sleep\par -on diclofenac since \par -doing PT as able\par -3/20 CTH negative\par -neurology referral given and encouraged\par -hx nl optho eval  per pt\par -advised prompt ER eval if worsened sx's of HA/imbalance or new neurologic sx's arise\par \par hx mechanical R 5th MT neck fracture 19- improved\par -followed by ortho, seen  with xray done noted healing. Advised to f/u prn.\par \par HTN, atypical CP- self resolved per pt; VSS\par -EKG  NSR @ 64 bpm, nl axis, no LVH/path Q; nonspecific T changes (no chg c/w )\par - echo: EF 69%, mild MR, borderline pulm HTN, nl LV fxn and size, mild diastolic dysfxn. \par - echo: EF 65%, nl LV fxn, mild pulm HTN, min MR, mild-mod TR. No PFO.\par -on amlodipine- benazepril\par -on HCTZ 12.5 mg qday, adherent\par -cardio f/u advised, now declines\par -followed by optho, hx eval  with nl dilated exam per pt\par -3/20 urine prt/crt wnl\par - and  cmp wnl, cbc wnl except plt clumped\par - plt (blue top) 275, wnl\par -low salt diet advised\par -advised prompt medical eval if sx's recur\par \par hx ARAGON- resolved per pt\par -seen by cardio , had echo. Advised pulm eval\par -seen by pulm , states called and told was "age appropriate" and advised increased exercise only. office f/u pending.\par \par preDM-  A1c 5.6 (was 5.8) - resolved, congratulated!\par -ADA diet, exercise encouraged\par \par HLD-  Tchol 251  LDL 1151 HDL 82; ASCV risk < 7.5\par -low fat diet, exercise encouraged\par \par GERD, IBS/constipation, hx hemorrhoids-\par - TSH wnl\par -on omeprazole prn\par -on metamucil prn\par -on prepH prn\par -hx c-scope by Dr. Hammond in - told nl, advised repeat in 5 yrs.\par -followed by GI, asked to forward records\par \par hx vit d def-\par - vit d wnl\par -on OTC vit d 2000U/d, Rx escribed per request willing to buy if not covered\par \par hx B12 def-\par - B12/folate wnl\par -on oral supplement\par \par hx skin rash, itching- dx'd eczematous dermatitis by derm 3/19 with amitriptyline dose increased. Resolved.\par -followed by derm\par -on amitriptyline\par -hx FBSE , yearly advised- referral given, pending. Regular use of sun block for skin cancer prevention advised.\par \par stress- improved per pt, onset 2/2 mother who  ; denies depression\par - referral and info for [facility] given prior- pending\par -info for behavioral health crisis walk-in center given prior\par -advised to f/u if sx's worsen\par \par MISC: Advised to cont social distancing and precautions for covid prevention. \par - covid AB screening negative\par \par \par HCM\par -hx CPE , yearly advised\par -hx negative hep C screening \par -hx flu shot  at work per pt, yearly advised\par -hx Tdap \par -to check on shingrix insurance coverage and get okay to get from rheum prior to considering\par -hx negative PAP  by GYN per pt, f/u pending\par -hx negative mammo , Rx for repeat given\par -hx screening colonoscopy , rec: repeat in 5 yrs\par \par Pt has prior scheduled appt for CPE 10/13/20, advised to f/u sooner as needed.

## 2020-07-29 NOTE — REVIEW OF SYSTEMS
[Negative] : Integumentary [FreeTextEntry9] : see HPI [de-identified] : see HPI [de-identified] : see HPI

## 2020-07-29 NOTE — PHYSICAL EXAM
[No Acute Distress] : no acute distress [Well-Appearing] : well-appearing [Normal Sclera/Conjunctiva] : normal sclera/conjunctiva [PERRL] : pupils equal round and reactive to light [EOMI] : extraocular movements intact [Normal Outer Ear/Nose] : the outer ears and nose were normal in appearance [Normal Oropharynx] : the oropharynx was normal [Normal Nasal Mucosa] : the nasal mucosa was normal [Supple] : supple [Thyroid Normal, No Nodules] : the thyroid was normal and there were no nodules present [No Respiratory Distress] : no respiratory distress  [Clear to Auscultation] : lungs were clear to auscultation bilaterally [Normal Rate] : normal rate  [Regular Rhythm] : with a regular rhythm [Normal S1, S2] : normal S1 and S2 [No Murmur] : no murmur heard [Pedal Pulses Present] : the pedal pulses are present [No Edema] : there was no peripheral edema [Soft] : abdomen soft [Non Tender] : non-tender [No HSM] : no HSM [Normal Supraclavicular Nodes] : no supraclavicular lymphadenopathy [Normal Posterior Cervical Nodes] : no posterior cervical lymphadenopathy [Normal Anterior Cervical Nodes] : no anterior cervical lymphadenopathy [No CVA Tenderness] : no CVA  tenderness [No Spinal Tenderness] : no spinal tenderness [No Joint Swelling] : no joint swelling [Grossly Normal Strength/Tone] : grossly normal strength/tone [No Rash] : no rash [No Focal Deficits] : no focal deficits [Normal Gait] : normal gait [Alert and Oriented x3] : oriented to person, place, and time [Normal Affect] : the affect was normal [de-identified] : negative rhomberg's

## 2020-08-05 ENCOUNTER — TRANSCRIPTION ENCOUNTER (OUTPATIENT)
Age: 55
End: 2020-08-05

## 2020-08-06 ENCOUNTER — TRANSCRIPTION ENCOUNTER (OUTPATIENT)
Age: 55
End: 2020-08-06

## 2020-08-07 ENCOUNTER — TRANSCRIPTION ENCOUNTER (OUTPATIENT)
Age: 55
End: 2020-08-07

## 2020-08-07 ENCOUNTER — LABORATORY RESULT (OUTPATIENT)
Age: 55
End: 2020-08-07

## 2020-08-08 ENCOUNTER — TRANSCRIPTION ENCOUNTER (OUTPATIENT)
Age: 55
End: 2020-08-08

## 2020-08-08 LAB
ALBUMIN SERPL ELPH-MCNC: 4.6 G/DL
ALP BLD-CCNC: 60 U/L
ALT SERPL-CCNC: 12 U/L
ANION GAP SERPL CALC-SCNC: 13 MMOL/L
APPEARANCE: CLEAR
AST SERPL-CCNC: 16 U/L
BACTERIA: NEGATIVE
BASOPHILS # BLD AUTO: 0.04 K/UL
BASOPHILS NFR BLD AUTO: 0.7 %
BILIRUB SERPL-MCNC: 0.6 MG/DL
BILIRUBIN URINE: NEGATIVE
BLOOD URINE: NEGATIVE
BUN SERPL-MCNC: 12 MG/DL
C3 SERPL-MCNC: 109 MG/DL
C4 SERPL-MCNC: 44 MG/DL
CALCIUM SERPL-MCNC: 9.6 MG/DL
CELLS.CD3-CD19+/CELLS IN BLOOD: 3 %
CHLORIDE SERPL-SCNC: 103 MMOL/L
CO2 SERPL-SCNC: 26 MMOL/L
COLOR: NORMAL
CREAT SERPL-MCNC: 0.7 MG/DL
CREAT SPEC-SCNC: 38 MG/DL
CREAT/PROT UR: 0.1 RATIO
EOSINOPHIL # BLD AUTO: 0.08 K/UL
EOSINOPHIL NFR BLD AUTO: 1.5 %
ERYTHROCYTE [SEDIMENTATION RATE] IN BLOOD BY WESTERGREN METHOD: 52 MM/HR
GLUCOSE QUALITATIVE U: NEGATIVE
GLUCOSE SERPL-MCNC: 69 MG/DL
HCT VFR BLD CALC: 44.9 %
HGB BLD-MCNC: 13.7 G/DL
HYALINE CASTS: 0 /LPF
IGA SER QL IEP: 189 MG/DL
IGG SER QL IEP: 1091 MG/DL
IGM SER QL IEP: 54 MG/DL
IMM GRANULOCYTES NFR BLD AUTO: 0.4 %
KETONES URINE: NEGATIVE
LEUKOCYTE ESTERASE URINE: ABNORMAL
LYMPHOCYTES # BLD AUTO: 1.72 K/UL
LYMPHOCYTES NFR BLD AUTO: 31.4 %
MAN DIFF?: NORMAL
MCHC RBC-ENTMCNC: 27.6 PG
MCHC RBC-ENTMCNC: 30.5 GM/DL
MCV RBC AUTO: 90.3 FL
MICROSCOPIC-UA: NORMAL
MONOCYTES # BLD AUTO: 0.35 K/UL
MONOCYTES NFR BLD AUTO: 6.4 %
NEUTROPHILS # BLD AUTO: 3.26 K/UL
NEUTROPHILS NFR BLD AUTO: 59.6 %
NITRITE URINE: NEGATIVE
PH URINE: 6
PLATELET # BLD AUTO: NORMAL K/UL
POTASSIUM SERPL-SCNC: 3.9 MMOL/L
PROT SERPL-MCNC: 6.9 G/DL
PROT UR-MCNC: 4 MG/DL
PROTEIN URINE: NEGATIVE
RBC # BLD: 4.97 M/UL
RBC # FLD: 15.6 %
RED BLOOD CELLS URINE: 1 /HPF
SARS-COV-2 IGG SERPL IA-ACNC: <0.1 INDEX
SARS-COV-2 IGG SERPL QL IA: NEGATIVE
SODIUM SERPL-SCNC: 143 MMOL/L
SPECIFIC GRAVITY URINE: 1.01
SQUAMOUS EPITHELIAL CELLS: 5 /HPF
UROBILINOGEN URINE: NORMAL
WBC # FLD AUTO: 5.47 K/UL
WHITE BLOOD CELLS URINE: 12 /HPF

## 2020-08-10 ENCOUNTER — TRANSCRIPTION ENCOUNTER (OUTPATIENT)
Age: 55
End: 2020-08-10

## 2020-08-10 LAB — DSDNA AB SER-ACNC: <12 IU/ML

## 2020-09-04 ENCOUNTER — LABORATORY RESULT (OUTPATIENT)
Age: 55
End: 2020-09-04

## 2020-09-04 ENCOUNTER — APPOINTMENT (OUTPATIENT)
Dept: RHEUMATOLOGY | Facility: CLINIC | Age: 55
End: 2020-09-04
Payer: COMMERCIAL

## 2020-09-04 VITALS
OXYGEN SATURATION: 97 % | HEIGHT: 66 IN | WEIGHT: 148 LBS | BODY MASS INDEX: 23.78 KG/M2 | SYSTOLIC BLOOD PRESSURE: 126 MMHG | TEMPERATURE: 97.3 F | HEART RATE: 66 BPM | DIASTOLIC BLOOD PRESSURE: 84 MMHG

## 2020-09-04 PROCEDURE — G0008: CPT

## 2020-09-04 PROCEDURE — 90686 IIV4 VACC NO PRSV 0.5 ML IM: CPT

## 2020-09-04 PROCEDURE — 99214 OFFICE O/P EST MOD 30 MIN: CPT | Mod: 25

## 2020-09-08 ENCOUNTER — RX RENEWAL (OUTPATIENT)
Age: 55
End: 2020-09-08

## 2020-09-08 ENCOUNTER — TRANSCRIPTION ENCOUNTER (OUTPATIENT)
Age: 55
End: 2020-09-08

## 2020-09-08 LAB
25(OH)D3 SERPL-MCNC: 50.7 NG/ML
ALBUMIN SERPL ELPH-MCNC: 4.8 G/DL
ALBUMIN SERPL ELPH-MCNC: 4.8 G/DL
ALP BLD-CCNC: 60 U/L
ALP BLD-CCNC: 60 U/L
ALT SERPL-CCNC: 11 U/L
ALT SERPL-CCNC: 11 U/L
ANION GAP SERPL CALC-SCNC: 15 MMOL/L
ANION GAP SERPL CALC-SCNC: 15 MMOL/L
APPEARANCE: CLEAR
AST SERPL-CCNC: 18 U/L
AST SERPL-CCNC: 18 U/L
BACTERIA: NEGATIVE
BASOPHILS # BLD AUTO: 0.03 K/UL
BASOPHILS NFR BLD AUTO: 0.5 %
BILIRUB SERPL-MCNC: 0.6 MG/DL
BILIRUB SERPL-MCNC: 0.6 MG/DL
BILIRUBIN URINE: NEGATIVE
BLOOD URINE: NEGATIVE
BUN SERPL-MCNC: 11 MG/DL
BUN SERPL-MCNC: 11 MG/DL
C3 SERPL-MCNC: 116 MG/DL
C4 SERPL-MCNC: 44 MG/DL
CALCIUM SERPL-MCNC: 10 MG/DL
CALCIUM SERPL-MCNC: 10 MG/DL
CELLS.CD3-CD19+/CELLS IN BLOOD: 2 %
CHLORIDE SERPL-SCNC: 103 MMOL/L
CHLORIDE SERPL-SCNC: 103 MMOL/L
CK SERPL-CCNC: 306 U/L
CO2 SERPL-SCNC: 25 MMOL/L
CO2 SERPL-SCNC: 25 MMOL/L
COLOR: COLORLESS
CREAT SERPL-MCNC: 0.73 MG/DL
CREAT SERPL-MCNC: 0.73 MG/DL
CREAT SPEC-SCNC: 18 MG/DL
CREAT/PROT UR: NORMAL RATIO
DSDNA AB SER-ACNC: <12 IU/ML
EOSINOPHIL # BLD AUTO: 0.04 K/UL
EOSINOPHIL NFR BLD AUTO: 0.6 %
ERYTHROCYTE [SEDIMENTATION RATE] IN BLOOD BY WESTERGREN METHOD: 33 MM/HR
GLUCOSE QUALITATIVE U: NEGATIVE
GLUCOSE SERPL-MCNC: 87 MG/DL
HCT VFR BLD CALC: 43.7 %
HGB BLD-MCNC: 13.4 G/DL
HYALINE CASTS: 0 /LPF
IGA SER QL IEP: 188 MG/DL
IGG SER QL IEP: 1303 MG/DL
IGM SER QL IEP: 59 MG/DL
IMM GRANULOCYTES NFR BLD AUTO: 0.2 %
KETONES URINE: NEGATIVE
LEUKOCYTE ESTERASE URINE: NEGATIVE
LYMPHOCYTES # BLD AUTO: 1.81 K/UL
LYMPHOCYTES NFR BLD AUTO: 28.8 %
MAN DIFF?: NORMAL
MCHC RBC-ENTMCNC: 27.3 PG
MCHC RBC-ENTMCNC: 30.7 GM/DL
MCV RBC AUTO: 89 FL
MICROSCOPIC-UA: NORMAL
MONOCYTES # BLD AUTO: 0.35 K/UL
MONOCYTES NFR BLD AUTO: 5.6 %
NEUTROPHILS # BLD AUTO: 4.04 K/UL
NEUTROPHILS NFR BLD AUTO: 64.3 %
NITRITE URINE: NEGATIVE
PH URINE: 6
PLATELET # BLD AUTO: NORMAL K/UL
POTASSIUM SERPL-SCNC: 4.2 MMOL/L
POTASSIUM SERPL-SCNC: 4.2 MMOL/L
PROT SERPL-MCNC: 7.3 G/DL
PROT SERPL-MCNC: 7.3 G/DL
PROT UR-MCNC: <4 MG/DL
PROTEIN URINE: NEGATIVE
RBC # BLD: 4.91 M/UL
RBC # FLD: 15.4 %
RED BLOOD CELLS URINE: 1 /HPF
SODIUM SERPL-SCNC: 143 MMOL/L
SODIUM SERPL-SCNC: 143 MMOL/L
SPECIFIC GRAVITY URINE: 1.01
SQUAMOUS EPITHELIAL CELLS: 1 /HPF
UROBILINOGEN URINE: NORMAL
WBC # FLD AUTO: 6.28 K/UL
WHITE BLOOD CELLS URINE: 0 /HPF

## 2020-09-18 ENCOUNTER — TRANSCRIPTION ENCOUNTER (OUTPATIENT)
Age: 55
End: 2020-09-18

## 2020-09-20 ENCOUNTER — RX RENEWAL (OUTPATIENT)
Age: 55
End: 2020-09-20

## 2020-09-21 ENCOUNTER — TRANSCRIPTION ENCOUNTER (OUTPATIENT)
Age: 55
End: 2020-09-21

## 2020-09-30 ENCOUNTER — TRANSCRIPTION ENCOUNTER (OUTPATIENT)
Age: 55
End: 2020-09-30

## 2020-10-02 ENCOUNTER — TRANSCRIPTION ENCOUNTER (OUTPATIENT)
Age: 55
End: 2020-10-02

## 2020-10-13 ENCOUNTER — LABORATORY RESULT (OUTPATIENT)
Age: 55
End: 2020-10-13

## 2020-10-13 ENCOUNTER — APPOINTMENT (OUTPATIENT)
Dept: INTERNAL MEDICINE | Facility: CLINIC | Age: 55
End: 2020-10-13
Payer: COMMERCIAL

## 2020-10-13 VITALS
SYSTOLIC BLOOD PRESSURE: 115 MMHG | HEART RATE: 74 BPM | DIASTOLIC BLOOD PRESSURE: 70 MMHG | BODY MASS INDEX: 24.11 KG/M2 | TEMPERATURE: 98.1 F | HEIGHT: 66 IN | RESPIRATION RATE: 16 BRPM | OXYGEN SATURATION: 98 % | WEIGHT: 150 LBS

## 2020-10-13 DIAGNOSIS — R92.2 INCONCLUSIVE MAMMOGRAM: ICD-10-CM

## 2020-10-13 DIAGNOSIS — L90.6 STRIAE ATROPHICAE: ICD-10-CM

## 2020-10-13 DIAGNOSIS — Z87.19 PERSONAL HISTORY OF OTHER DISEASES OF THE DIGESTIVE SYSTEM: ICD-10-CM

## 2020-10-13 DIAGNOSIS — S92.351D DISPLACED FRACTURE OF FIFTH METATARSAL BONE, RIGHT FOOT, SUBSEQUENT ENCOUNTER FOR FRACTURE WITH ROUTINE HEALING: ICD-10-CM

## 2020-10-13 PROCEDURE — 99396 PREV VISIT EST AGE 40-64: CPT | Mod: 25

## 2020-10-13 PROCEDURE — 93000 ELECTROCARDIOGRAM COMPLETE: CPT | Mod: 59

## 2020-10-13 PROCEDURE — 99213 OFFICE O/P EST LOW 20 MIN: CPT | Mod: 25

## 2020-10-13 PROCEDURE — G0444 DEPRESSION SCREEN ANNUAL: CPT

## 2020-10-13 NOTE — REVIEW OF SYSTEMS
[Negative] : Integumentary [FreeTextEntry9] : see HPI [de-identified] : see HPI [de-identified] : see HPI

## 2020-10-13 NOTE — ASSESSMENT
[FreeTextEntry1] : 56 yo F pmhx HTN, HLD, preDM, hx ARAGON, MCTD, necrotizing myopathy, vit d insuff, B12 def for CPE\par \par \par hx stress 2/2 mother's death in 4/20- recent depression/anxiety a/w grief- mild on PH Q 9, denies HI/SI\par -declines standing mood medication (ie SSRI)\par -willing for trial of clonazepam, istop checked.  Risks/benefits/SEs discussed.  Advised to avoid concurrent use with Ambien (given by rheum), amitriptyline and ETOH given potential additive effects.  Advised of need for office f/u for further refills.\par - referral and info for [facility] [employer] [facility] given prior- pending\par -info for behavioral health crisis walk-in center given again today\par -advised to f/u if sx's worsen\par \par hx MCTD, necrotizing myopathy (hx muscle bx), hx imbalance (improved)/ /HAs (resolved)- \par -followed by rheum, seen 9/20. diclofenac changed to etodolac, gabapentin dose increased.  Labs done.  Has f/u 11/20.\par -hx serum sickness with IVIg, avoiding during covid pandemic as high risk with use per pt\par -on CellCept (restarted fall 2019)\par -on rituxan since 4/19\par -on neurontin\par -on amitriptyline qhs for sleep\par -on etodolace since 9/20 (when diclofenac d/c'd by rheum)\par -doing PT as able\par -3/20 CTH negative\par -neurology referral given prior- pending, encouraged\par -hx nl optho eval 1/20 per pt\par -advised prompt ER eval if worsened sx's of HA/imbalance or new neurologic sx's arise\par \par hx mechanical R 5th MT neck fracture 11/16/19- improved\par -followed by ortho, seen 6/20 with xray done noted healing. Advised to f/u prn.\par \par HTN, atypical CP, hx abnl EKG- self resolved per pt; VSS\par -EKG today NSR @ 71 bpm, nl axis, no LVH/path Q; nonspecific T changes (no chg c/w 8/19)\par -7/18 echo: EF 69%, mild MR, borderline pulm HTN, nl LV fxn and size, mild diastolic dysfxn. \par -8/19 echo: EF 65%, nl LV fxn, mild pulm HTN, min MR, mild-mod TR. No PFO.\par -on amlodipine- benazepril and HCTZ 12.5 mg qday\par -cardio f/u advised\par -followed by optho, hx eval 1/20 with nl dilated exam per pt\par -9/20 UA negative, urine prt/crt wnl\par -9/20 cmp wnl, cbc wnl except plt clumped \par -6/20 plt (blue top) 275, wnl\par -low salt diet advised\par -advised prompt medical eval if sx's recur\par -check cbc/blue top plt, cmp\par \par hx ARAGON- resolved per pt\par -seen by cardio 7/18, had echo. Advised pulm eval\par -seen by pulm 7/18, states called and told was "age appropriate" and advised increased exercise only.  Office f/u pending.\par \par preDM- 5/20 A1c 5.6 (was 5.8) - resolved\par -ADA diet, exercise encouraged\par -check A1c\par \par HLD- 5/20 Tchol 251  LDL 1151 HDL 82; ASCV risk < 7.5\par -low fat diet, exercise encouraged\par -check lipids\par \par GERD, IBS/constipation, hx hemorrhoids-\par -followed by GI\par -hx c-scope by Dr. Hammond in 4/18- told nl, advised repeat in 5 yrs.\par -hx omeprazole prn\par -on metamucil prn\par -on prepH prn\par -asked to forward records\par \par hx vit d def-\par -6/20 vit d wnl\par -on OTC supp\par -check level\par \par hx B12 def-\par -9/19 B12/folate wnl - check repeat\par -on oral supplement\par \par hx skin rash, itching- dx'd eczematous dermatitis by derm 3/19 with amitriptyline dose increased. Resolved.\par -followed by derm\par -on amitriptyline\par -hx FBSE 9/18, yearly advised. Regular use of sun block for skin cancer prevention advised.\par \par MISC: Advised to cont social distancing and precautions for covid prevention. \par -8/20 covid AB screening negative\par \par \par HCM\par -check screening labs; HIV/STD screening\par -hx negative hep C screening 4/20\par -hx flu shot 9/20\par -hx Tdap 8/18\par -advised to check on shingrix insurance coverage and get okay to get from rheum prior to considering\par -hx negative PAP 8/19 by GYN per pt\par -hx negative mammo 8/20\par -hx nl DEXA 12/19\par -hx screening colonoscopy 4/18, rec: repeat in 5 yrs\par -yearly dental screening advised\par \par Pt has prior scheduled office f/u visit 1/21/21.  Advised to f/u in mo re: mood.

## 2020-10-13 NOTE — PAST MEDICAL HISTORY
[Definite ___ (Date)] : the last menstrual period was [unfilled] [Total Preg ___] : G[unfilled] [Living ___] : Living: [unfilled] [AB Induced ___] : elective abortions: [unfilled]

## 2020-10-13 NOTE — PHYSICAL EXAM
[No Acute Distress] : no acute distress [Well-Appearing] : well-appearing [Normal Sclera/Conjunctiva] : normal sclera/conjunctiva [PERRL] : pupils equal round and reactive to light [EOMI] : extraocular movements intact [Normal Outer Ear/Nose] : the outer ears and nose were normal in appearance [Normal Oropharynx] : the oropharynx was normal [Normal Nasal Mucosa] : the nasal mucosa was normal [Supple] : supple [Thyroid Normal, No Nodules] : the thyroid was normal and there were no nodules present [No Respiratory Distress] : no respiratory distress  [Clear to Auscultation] : lungs were clear to auscultation bilaterally [Normal Rate] : normal rate  [Regular Rhythm] : with a regular rhythm [Normal S1, S2] : normal S1 and S2 [No Murmur] : no murmur heard [Pedal Pulses Present] : the pedal pulses are present [No Edema] : there was no peripheral edema [Soft] : abdomen soft [Non Tender] : non-tender [No HSM] : no HSM [Normal Supraclavicular Nodes] : no supraclavicular lymphadenopathy [Normal Posterior Cervical Nodes] : no posterior cervical lymphadenopathy [Normal Anterior Cervical Nodes] : no anterior cervical lymphadenopathy [No CVA Tenderness] : no CVA  tenderness [No Spinal Tenderness] : no spinal tenderness [No Joint Swelling] : no joint swelling [Grossly Normal Strength/Tone] : grossly normal strength/tone [No Rash] : no rash [No Focal Deficits] : no focal deficits [Normal Gait] : normal gait [Normal Affect] : the affect was normal [Alert and Oriented x3] : oriented to person, place, and time [Normal TMs] : both tympanic membranes were normal [No Lymphadenopathy] : no lymphadenopathy [de-identified] : tearful at times

## 2020-10-13 NOTE — HISTORY OF PRESENT ILLNESS
[Health Maintenance] : health maintenance [Spouse] : spouse [] :  [Working Full Time] : working full time [Occupation ___] : occupation: [unfilled] [Former Cigarette Smoker] : is a former cigarette smoker [Quit Cigarettes: ___] : quit smoking [unfilled] [Occasional Use] : occasional alcohol use [Never] : has never used illicit drugs [Fair] : fair [Reg. Dental Visits] : She has regular dental visits [Healthy Diet] : She consumes a diverse and healthy diet [Postmenopausal] : the patient is postmenopausal [Regular Exercise] : She exercises regularly [FreeTextEntry1] : CPE [Binge Drinking] : denies binge drinking [Patient Concern] : no personal concern about alcohol use [Family Concern] : no family concern about alcohol use [Vision Problems] : She denies vision problems [Hearing Loss] : She denies hearing loss [de-identified] : 8/19 [de-identified] : hx 1/2 ppd x 20 yrs [de-identified] : hx flu shot 9/20, hx Tdap 8/18, hx PVX 3/20, hx hep B series, no hx shingles vaccine [de-identified] : \par 56 yo F pmhx HTN, HLD, preDM, hx ARAGON, MCTD, necrotizing myopathy, vit d insuff, B12 def for CPE\par \par Last seen 7/29/20 in office for f/u.\par \par Feeling well today.\par Does not need med refill.\par Not fasting- wants slip to do fasting.\par \par Reports on-going grief and recent depression/anxiety  2/2 mom passing away in 4/20 (was at a NH and endstage COPD).  States was coping well, but recently caring for selling of mom's home and has brought back emotions in past 2-3 weeks  \par -denies HI/SI\par -gets anxious at times, onset daily- unsure of trigger, but it resolves with prayers.    \par -No hx medications for mood prior- interested for short term use only\par -has co-worker that confides in, but still finds does not have someone to talk with regularly.  BH eval pending\par -some trouble with sleep- both falling as staying asleep, using Ambien (given #10 by rheum at 9/20 visit- states used ~ 5 so far) or amitriptyline prn with some help, but doesn't like to take meds\par -reports good appetite and po intake, wt stable\par \par Hx rheum f/u 9/20- ongoing joint pains in hands reported, diclofenac changed to etodolac, gabapentin dose increased. Labs done. Has f/u 11/20.\par -feels joint sx's better since seen by rheum with advised med changes\par -reports hx improved imbalance/HAs (hx negative CTH 3/20)\par -reports HAs resolved since 5/20\par -hx imbalance on/off, no falls or focal weakness.  Feels PT helps\par -doing PT q Saturday on/off\par -neuro eval pending\par \par hx mechanical R 5th MT neck fracture 11/16/19- notes improved, no pain meds used\par -followed by ortho, seen 6/20 with xray done noted healing.  Advised to f/u prn.\par -no PT done due to covid pandemic, feels not needed currently\par \par HTN, hx CP- notes CP resolved x many months on own\par -on med regimen, adherent\par -hx optho eval 1/20, thinks had nl dilated exam then\par -cardio eval pending\par -denies cough, sob, palpitations or dizziness\par \par Reports hx intentionally lost weight since 3/20, now stable\par eating healthy\par exercising regularly: online health  qod- done w/o sx's or limitations, feels helps her joint symptoms. \par \par Reports nl appetite and BMs (no recent Metamucil use).\par Denies GI complaints.\par \par Reports is socially distancing, using precautions for covid19 (N95, gloves) and has not been around and known + contacts.\par Denies fever, chills, cough, CP or sob.\par \par \par Denies  complaints.

## 2020-10-13 NOTE — HEALTH RISK ASSESSMENT
[Patient reported mammogram was normal] : Patient reported mammogram was normal [Patient reported PAP Smear was normal] : Patient reported PAP Smear was normal [Patient reported bone density results were normal] : Patient reported bone density results were normal [Patient reported colonoscopy was normal] : Patient reported colonoscopy was normal [Feels Safe at Home] : Feels safe at home [Smoke Detector] : smoke detector [Carbon Monoxide Detector] : carbon monoxide detector [Guns at Home] : guns at home [Seat Belt] :  uses seat belt [Sunscreen] : uses sunscreen [HIV test declined] : HIV test declined [0] : 1) Little interest or pleasure doing things: Not at all (0) [2] : 2) Feeling down, depressed, or hopeless for more than half of the days (2) [HGH3Rdtcs] : 2 [NHI3Ojxbr] : 6, mild [MammogramDate] : 08/20 [PapSmearDate] : 08/19 [BoneDensityDate] : 12/19 [ColonoscopyDate] : 04/18 [ColonoscopyComments] : told nl and repeat in 5 yrs per pt [HepatitisCDate] : 04/20 [HepatitisCComments] : negative [de-identified] :  is retired - kept in locked safe

## 2020-10-14 ENCOUNTER — TRANSCRIPTION ENCOUNTER (OUTPATIENT)
Age: 55
End: 2020-10-14

## 2020-10-22 ENCOUNTER — NON-APPOINTMENT (OUTPATIENT)
Age: 55
End: 2020-10-22

## 2020-10-22 ENCOUNTER — APPOINTMENT (OUTPATIENT)
Dept: CARDIOLOGY | Facility: CLINIC | Age: 55
End: 2020-10-22
Payer: COMMERCIAL

## 2020-10-22 VITALS
HEART RATE: 64 BPM | DIASTOLIC BLOOD PRESSURE: 96 MMHG | OXYGEN SATURATION: 100 % | HEIGHT: 66 IN | BODY MASS INDEX: 24.53 KG/M2 | SYSTOLIC BLOOD PRESSURE: 157 MMHG | TEMPERATURE: 97.2 F

## 2020-10-22 VITALS — BODY MASS INDEX: 24.53 KG/M2 | WEIGHT: 152 LBS

## 2020-10-22 PROCEDURE — 93000 ELECTROCARDIOGRAM COMPLETE: CPT

## 2020-10-22 PROCEDURE — 99213 OFFICE O/P EST LOW 20 MIN: CPT

## 2020-10-22 PROCEDURE — 99072 ADDL SUPL MATRL&STAF TM PHE: CPT

## 2020-10-23 ENCOUNTER — TRANSCRIPTION ENCOUNTER (OUTPATIENT)
Age: 55
End: 2020-10-23

## 2020-10-25 NOTE — PHYSICAL EXAM
[General Appearance - Well Developed] : well developed [Normal Appearance] : normal appearance [Well Groomed] : well groomed [General Appearance - Well Nourished] : well nourished [No Deformities] : no deformities [General Appearance - In No Acute Distress] : no acute distress [Normal Conjunctiva] : the conjunctiva exhibited no abnormalities [Eyelids - No Xanthelasma] : the eyelids demonstrated no xanthelasmas [Normal Oral Mucosa] : normal oral mucosa [No Oral Pallor] : no oral pallor [No Oral Cyanosis] : no oral cyanosis [Normal Jugular Venous A Waves Present] : normal jugular venous A waves present [Normal Jugular Venous V Waves Present] : normal jugular venous V waves present [No Jugular Venous Mendieta A Waves] : no jugular venous mendieta A waves [Heart Rate And Rhythm] : heart rate and rhythm were normal [Heart Sounds] : normal S1 and S2 [Murmurs] : no murmurs present [Respiration, Rhythm And Depth] : normal respiratory rhythm and effort [Exaggerated Use Of Accessory Muscles For Inspiration] : no accessory muscle use [Auscultation Breath Sounds / Voice Sounds] : lungs were clear to auscultation bilaterally [Abdomen Soft] : soft [Abdomen Tenderness] : non-tender [Abdomen Mass (___ Cm)] : no abdominal mass palpated [Abnormal Walk] : normal gait [Gait - Sufficient For Exercise Testing] : the gait was sufficient for exercise testing [Nail Clubbing] : no clubbing of the fingernails [Cyanosis, Localized] : no localized cyanosis [Petechial Hemorrhages (___cm)] : no petechial hemorrhages [Skin Color & Pigmentation] : normal skin color and pigmentation [] : no rash [No Venous Stasis] : no venous stasis [Skin Lesions] : no skin lesions [No Skin Ulcers] : no skin ulcer [No Xanthoma] : no  xanthoma was observed [Oriented To Time, Place, And Person] : oriented to person, place, and time [Affect] : the affect was normal [Mood] : the mood was normal [No Anxiety] : not feeling anxious

## 2020-10-25 NOTE — REASON FOR VISIT
[FreeTextEntry1] : Dear Randee Amador and Karen:\par \par I had the pleasure of evaluating your patient, Ms. Iza Murguia, who presents for initial cardiovascular and vascular medicine evaluation.  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  Her chronic conditions include persistent pain in the joints of both her hands and feet.\par \par She has also noted having chronic SOB and borderline pulmonary HTN. Denies having chest pain or palpitations.\par \par She has been hypertensive, with SBPs 140-150s mmHg routinely at home.\par Last lipid panel June 2020 -- , TG 92, HDL 82, LDL\par 12-lead ECG notes SR, normal axis, normal intervals, non-specific T-waves.\par Physical examination was unremarkabe.\par \par At this time, will arrange an echocardiogram and NST.\par

## 2020-11-05 ENCOUNTER — TRANSCRIPTION ENCOUNTER (OUTPATIENT)
Age: 55
End: 2020-11-05

## 2020-11-06 ENCOUNTER — APPOINTMENT (OUTPATIENT)
Dept: RHEUMATOLOGY | Facility: CLINIC | Age: 55
End: 2020-11-06
Payer: COMMERCIAL

## 2020-11-06 ENCOUNTER — LABORATORY RESULT (OUTPATIENT)
Age: 55
End: 2020-11-06

## 2020-11-06 VITALS
WEIGHT: 149 LBS | OXYGEN SATURATION: 98 % | HEIGHT: 66 IN | RESPIRATION RATE: 16 BRPM | TEMPERATURE: 97.5 F | SYSTOLIC BLOOD PRESSURE: 129 MMHG | HEART RATE: 80 BPM | DIASTOLIC BLOOD PRESSURE: 82 MMHG | BODY MASS INDEX: 23.95 KG/M2

## 2020-11-06 PROCEDURE — 99072 ADDL SUPL MATRL&STAF TM PHE: CPT

## 2020-11-06 PROCEDURE — 99215 OFFICE O/P EST HI 40 MIN: CPT | Mod: 25

## 2020-11-06 RX ORDER — DICLOFENAC SODIUM 100 MG/1
100 TABLET, FILM COATED, EXTENDED RELEASE ORAL DAILY
Qty: 30 | Refills: 2 | Status: DISCONTINUED | COMMUNITY
Start: 2020-06-12 | End: 2020-11-06

## 2020-11-09 ENCOUNTER — TRANSCRIPTION ENCOUNTER (OUTPATIENT)
Age: 55
End: 2020-11-09

## 2020-11-09 LAB
25(OH)D3 SERPL-MCNC: 47.9 NG/ML
ALBUMIN SERPL ELPH-MCNC: 4.9 G/DL
ALBUMIN SERPL ELPH-MCNC: 4.9 G/DL
ALP BLD-CCNC: 69 U/L
ALP BLD-CCNC: 69 U/L
ALT SERPL-CCNC: 10 U/L
ALT SERPL-CCNC: 10 U/L
ANION GAP SERPL CALC-SCNC: 18 MMOL/L
ANION GAP SERPL CALC-SCNC: 18 MMOL/L
AST SERPL-CCNC: 18 U/L
AST SERPL-CCNC: 18 U/L
BASOPHILS # BLD AUTO: 0.03 K/UL
BASOPHILS NFR BLD AUTO: 0.5 %
BILIRUB SERPL-MCNC: 0.8 MG/DL
BILIRUB SERPL-MCNC: 0.8 MG/DL
BUN SERPL-MCNC: 10 MG/DL
BUN SERPL-MCNC: 10 MG/DL
CALCIUM SERPL-MCNC: 10 MG/DL
CALCIUM SERPL-MCNC: 10 MG/DL
CELLS.CD3-CD19+/CELLS IN BLOOD: 3 %
CHLORIDE SERPL-SCNC: 100 MMOL/L
CHLORIDE SERPL-SCNC: 100 MMOL/L
CHOLEST SERPL-MCNC: 244 MG/DL
CK SERPL-CCNC: 141 U/L
CO2 SERPL-SCNC: 24 MMOL/L
CO2 SERPL-SCNC: 24 MMOL/L
CREAT SERPL-MCNC: 0.71 MG/DL
CREAT SERPL-MCNC: 0.71 MG/DL
CRP SERPL-MCNC: <0.1 MG/DL
EOSINOPHIL # BLD AUTO: 0.02 K/UL
EOSINOPHIL NFR BLD AUTO: 0.3 %
ERYTHROCYTE [SEDIMENTATION RATE] IN BLOOD BY WESTERGREN METHOD: 30 MM/HR
ESTIMATED AVERAGE GLUCOSE: 120 MG/DL
FOLATE SERPL-MCNC: 19.6 NG/ML
GLUCOSE SERPL-MCNC: 95 MG/DL
HBA1C MFR BLD HPLC: 5.8 %
HCT VFR BLD CALC: 46.7 %
HDLC SERPL-MCNC: 96 MG/DL
HGB BLD-MCNC: 14.1 G/DL
IGA SER QL IEP: 192 MG/DL
IGG SER QL IEP: 1263 MG/DL
IGM SER QL IEP: 58 MG/DL
IMM GRANULOCYTES NFR BLD AUTO: 0.2 %
LDLC SERPL CALC-MCNC: 140 MG/DL
LYMPHOCYTES # BLD AUTO: 1.94 K/UL
LYMPHOCYTES NFR BLD AUTO: 31.5 %
MAN DIFF?: NORMAL
MCHC RBC-ENTMCNC: 27.7 PG
MCHC RBC-ENTMCNC: 30.2 GM/DL
MCV RBC AUTO: 91.7 FL
MONOCYTES # BLD AUTO: 0.33 K/UL
MONOCYTES NFR BLD AUTO: 5.4 %
NEUTROPHILS # BLD AUTO: 3.83 K/UL
NEUTROPHILS NFR BLD AUTO: 62.1 %
NONHDLC SERPL-MCNC: 148 MG/DL
PLATELET # BLD AUTO: NORMAL K/UL
POTASSIUM SERPL-SCNC: 4.3 MMOL/L
POTASSIUM SERPL-SCNC: 4.3 MMOL/L
PROT SERPL-MCNC: 7.5 G/DL
PROT SERPL-MCNC: 7.5 G/DL
RBC # BLD: 5.09 M/UL
RBC # FLD: 15.2 %
SODIUM SERPL-SCNC: 141 MMOL/L
SODIUM SERPL-SCNC: 141 MMOL/L
TRIGL SERPL-MCNC: 41 MG/DL
TSH SERPL-ACNC: 1.22 UIU/ML
VIT B12 SERPL-MCNC: 952 PG/ML
WBC # FLD AUTO: 6.16 K/UL

## 2020-11-12 ENCOUNTER — APPOINTMENT (OUTPATIENT)
Dept: CV DIAGNOSTICS | Facility: HOSPITAL | Age: 55
End: 2020-11-12
Payer: COMMERCIAL

## 2020-11-12 ENCOUNTER — OUTPATIENT (OUTPATIENT)
Dept: OUTPATIENT SERVICES | Facility: HOSPITAL | Age: 55
LOS: 1 days | End: 2020-11-12

## 2020-11-12 ENCOUNTER — APPOINTMENT (OUTPATIENT)
Dept: INTERNAL MEDICINE | Facility: CLINIC | Age: 55
End: 2020-11-12
Payer: COMMERCIAL

## 2020-11-12 ENCOUNTER — APPOINTMENT (OUTPATIENT)
Dept: CV DIAGNOSITCS | Facility: HOSPITAL | Age: 55
End: 2020-11-12
Payer: COMMERCIAL

## 2020-11-12 DIAGNOSIS — R94.31 ABNORMAL ELECTROCARDIOGRAM [ECG] [EKG]: ICD-10-CM

## 2020-11-12 DIAGNOSIS — Z98.51 TUBAL LIGATION STATUS: Chronic | ICD-10-CM

## 2020-11-12 DIAGNOSIS — I10 ESSENTIAL (PRIMARY) HYPERTENSION: ICD-10-CM

## 2020-11-12 DIAGNOSIS — Z98.890 OTHER SPECIFIED POSTPROCEDURAL STATES: Chronic | ICD-10-CM

## 2020-11-12 DIAGNOSIS — Z98.89 OTHER SPECIFIED POSTPROCEDURAL STATES: Chronic | ICD-10-CM

## 2020-11-12 DIAGNOSIS — R07.89 OTHER CHEST PAIN: ICD-10-CM

## 2020-11-12 PROCEDURE — 78452 HT MUSCLE IMAGE SPECT MULT: CPT | Mod: 26

## 2020-11-12 PROCEDURE — 93018 CV STRESS TEST I&R ONLY: CPT | Mod: GC

## 2020-11-12 PROCEDURE — 93306 TTE W/DOPPLER COMPLETE: CPT | Mod: 26

## 2020-11-12 PROCEDURE — 99442: CPT

## 2020-11-12 PROCEDURE — 93016 CV STRESS TEST SUPVJ ONLY: CPT | Mod: GC

## 2020-11-12 NOTE — HISTORY OF PRESENT ILLNESS
[FreeTextEntry1] : f/u [Home] : at home, [unfilled] , at the time of the visit. [Medical Office: ([facility] [place])___] : at the medical office located in  [Verbal consent obtained from patient] : the patient, [unfilled] [de-identified] : \par As this is a telemedicine visit, no physical exam could be performed.  Diagnosis and treatment is based on symptoms provided.\par No video done due to technical trouble pt had.\par \par 56 yo F pmhx HTN, HLD, preDM, hx ARAGON, MCTD, necrotizing myopathy, vit d insuff, B12 def for f/u\par \par Last seen 10/13/20 in office for CPE with labs ordered (pending).\par \par Feeling well today.\par Needs med refill.\par \par Reports had labs done at rheum visit 11/20, all except plt in blue top\par \par hx grief and recent depression/anxiety - overall feeling better with mood, on Klonopin prn with help and not taking ambien any longer\par -denies panic attacks/HI or SI\par -mainly 2/2 mom passing away in 4/20 (was at a NH and endstage COPD). \par -hx gets anxious at times, onset daily- unsure of trigger, but it resolves with prayers.    \par -No hx medications for mood prior- interested for short term use only\par -has co-worker that confides in, but still finds does not have someone to talk with regularly.  BH eval pending\par -some trouble with sleep- both falling as staying asleep, hx using Ambien (given #10 by rheum at 9/20 visit- since finished) or amitriptyline prn with some help, but doesn't like to take meds\par -reports good appetite and po intake, wt stable\par \par Hx rheum f/u 11/20- ongoing joint pains in hands reported, cont'd on med regimen with adherence encouraged. Labs done. Has f/u 1/21.\par -feels joint sx's better since seen by rheum with advised med changes\par -reports hx improved imbalance/HAs (hx negative CTH 3/20)\par -reports HAs resolved since 5/20\par -hx imbalance on/off, no falls or focal weakness.  Feels PT helps\par -doing PT q Saturday on/off\par -neuro eval pending\par \par HTN, hx CP- \par -on med regimen, adherent\par -hx optho eval 1/20, thinks had nl dilated exam then\par -seen by cardio, Dr. Rivero 10/20 and is awaiting ehco and NST 11/20\par -denies cough, sob, palpitations or dizziness\par \par eating healthy\par exercising regularly: online health  qod- done w/o sx's or limitations, feels helps her joint symptoms. \par \par Reports nl appetite and BMs (no recent Metamucil use).\par Denies GI complaints.\par \par Reports is socially distancing, using precautions for covid19 (N95, gloves) and has not been around and known + contacts.\par Denies fever, chills, cough, CP or sob.\par \par \par Denies  complaints.

## 2020-11-12 NOTE — ASSESSMENT
[FreeTextEntry1] : 56 yo F pmhx HTN, HLD, preDM, hx ARAGON, MCTD, necrotizing myopathy, vit d insuff, B12 def for f/u\par \par \par hx stress 2/2 mother's death in 4/20- recent depression/anxiety a/w grief- improving; hx mild on PH Q 9, denies HI/SI\par -declines standing mood medication (ie SSRI)\par -on clonazepam with help- refilled, istop checked.  Risks/benefits/SEs discussed.  Advised to avoid concurrent use with Ambien (given by rheum), amitriptyline and ETOH given potential additive effects.  Advised of need for office f/u for further refills.\par - referral and info for [facility] given prior- pending\par -info for behavioral health crisis walk-in center given prior\par -advised to f/u if sx's worsen\par \par hx MCTD, necrotizing myopathy (hx muscle bx), hx imbalance (improved)/ /HAs (resolved)- \par -followed by rheum, seen 11/20 noted ongoing joint pains in hands reported, cont'd on med regimen with adherence encouraged. Labs done. Has f/u 1/21\par -hx serum sickness with IVIg, avoiding during covid pandemic as high risk with use per pt\par -on CellCept (restarted fall 2019)\par -on rituxan since 4/19\par -on neurontin\par -on amitriptyline qhs for sleep\par -on etodolace since 9/20 (when diclofenac d/c'd by rheum)\par -doing PT as able\par -3/20 CTH negative\par -neurology referral given prior- pending, encouraged\par -hx nl optho eval 1/20 per pt\par -advised prompt ER eval if worsened sx's of HA/imbalance or new neurologic sx's arise\par \par hx mechanical R 5th MT neck fracture 11/16/19- improved\par -followed by ortho, seen 6/20 with xray done noted healing. Advised to f/u prn.\par \par HTN, atypical CP, hx abnl EKG- self resolved per pt\par -7/18 echo: EF 69%, mild MR, borderline pulm HTN, nl LV fxn and size, mild diastolic dysfxn. \par -8/19 echo: EF 65%, nl LV fxn, mild pulm HTN, min MR, mild-mod TR. No PFO.\par -on amlodipine- benazepril and HCTZ 12.5 mg qday\par -seen by cardio,  Dr. Rivero 10/20 and is awaiting ehco and NST 11/20\par -followed by optho, hx eval 1/20 with nl dilated exam per pt\par -9/20 UA negative, urine prt/crt wnl\par -9/20 cmp wnl, cbc wnl except plt clumped \par -6/20 plt (blue top) 275, wnl - repeat pending\par -11/20 cmp wnl, cbc wnl, except plt clumped\par -low salt diet advised\par -advised prompt medical eval if sx's recur\par -check plt in blue top at next visit\par \par hx ARAGON- resolved per pt\par -seen by pulm 7/18, states called and told was "age appropriate" and advised increased exercise only.  Office f/u pending.\par -seen by cardio,  Dr. Rivero 10/20 and is awaiting ehco and NST 11/20\par \par preDM- 10/20 A1c 5.8 (was 5.6) \par -ADA diet, exercise encouraged\par -check A1c in 3mo\par \par HLD- 10/20 Tchol 244 TG 41  HDL 96; ASCV risk < 7.5\par -low fat diet, exercise encouraged\par \par GERD, IBS/constipation, hx hemorrhoids-\par -followed by GI\par -hx c-scope by Dr. Hammond in 4/18- told nl, advised repeat in 5 yrs.\par -hx omeprazole prn\par -on metamucil prn\par -on prepH prn\par -asked to forward records\par \par hx vit d def-\par -10/20 vit d wnl\par -on OTC supp\par \par hx B12 def-\par -10/20 B12/folate wnl\par -on oral supplement\par \par hx skin rash, itching- dx'd eczematous dermatitis by derm 3/19 with amitriptyline dose increased. Resolved.\par -followed by derm\par -on amitriptyline\par -hx FBSE 9/18, yearly advised. Regular use of sun block for skin cancer prevention advised.\par \par MISC: Advised to cont social distancing and precautions for covid prevention. \par -8/20 covid AB screening negative\par \par MISC: will mail pt lab slip for plt in blue top tube\par \par \par HCM\par -hx CPE 10/20\par -hx negative hep C screening 4/20\par -hx flu shot 9/20\par -hx Tdap 8/18\par -advised to check on shingrix insurance coverage and get okay to get from rheum prior to considering\par -hx negative PAP 8/19 by GYN per pt\par -hx negative mammo 8/20\par -hx nl DEXA 12/19\par -hx screening colonoscopy 4/18, rec: repeat in 5 yrs\par \par Pt has prior scheduled office f/u visit 1/21/21.  Advised to f/u 1 mo or sooner as needed.

## 2020-11-13 ENCOUNTER — TRANSCRIPTION ENCOUNTER (OUTPATIENT)
Age: 55
End: 2020-11-13

## 2020-11-23 LAB
EJ AB SER QL: NEGATIVE
ENA JO1 AB SER IA-ACNC: <20 UNITS
ENA PM/SCL AB SER-ACNC: <20 UNITS
ENA SM+RNP AB SER IA-ACNC: <20 UNITS
ENA SS-A IGG SER QL: <20 UNITS
FIBRILLARIN AB SER QL: NEGATIVE
KU AB SER QL: NEGATIVE
MDA-5 (P140)(CADM-140): <20 UNITS
MI2 AB SER QL: NEGATIVE
NXP-2 (P140): <20 UNITS
OJ AB SER QL: NEGATIVE
PL12 AB SER QL: NEGATIVE
PL7 AB SER QL: NEGATIVE
SRP AB SERPL QL: NEGATIVE
TIF GAMMA (P155/140): <20 UNITS
U2 SNRNP AB SER QL: NEGATIVE

## 2020-12-21 ENCOUNTER — TRANSCRIPTION ENCOUNTER (OUTPATIENT)
Age: 55
End: 2020-12-21

## 2020-12-22 ENCOUNTER — TRANSCRIPTION ENCOUNTER (OUTPATIENT)
Age: 55
End: 2020-12-22

## 2020-12-22 ENCOUNTER — RX RENEWAL (OUTPATIENT)
Age: 55
End: 2020-12-22

## 2020-12-22 ENCOUNTER — NON-APPOINTMENT (OUTPATIENT)
Age: 55
End: 2020-12-22

## 2020-12-29 ENCOUNTER — APPOINTMENT (OUTPATIENT)
Dept: INTERNAL MEDICINE | Facility: CLINIC | Age: 55
End: 2020-12-29
Payer: COMMERCIAL

## 2020-12-29 ENCOUNTER — TRANSCRIPTION ENCOUNTER (OUTPATIENT)
Age: 55
End: 2020-12-29

## 2020-12-29 PROCEDURE — 99214 OFFICE O/P EST MOD 30 MIN: CPT | Mod: 95

## 2020-12-29 NOTE — HISTORY OF PRESENT ILLNESS
[Home] : at home, [unfilled] , at the time of the visit. [Medical Office: ([facility])___] : at the medical office located in  [Verbal consent obtained from patient] : the patient, [unfilled] [FreeTextEntry8] : As this is a telemedicine visit, no physical exam could be performed. Diagnosis and treatment is based on symptoms provided.\par \par \par 56 yo F pmhx HTN, HLD, preDM, hx ARAGON, MCTD, necrotizing myopathy, vit d insuff, B12 def for acute visit\par \par Last encounter 11/12/20 vai telephone for f/u - reported improved mood with Klonpin prn and BH eval pending\par \par Last seen 10/13/20 in office for CPE with labs ordered (pending, done with rheum).\par \par Reports is socially distancing and using precautions for covid prevention.  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  Denies known sick or covid positive contacts.\par -exam noted unremarkable, VSS and afebrile\par -given nasal Rx Affrin\par -covid pcr done, found negative\par \par Today reports no improvement in sx's and feels may be slightly worsen.\par +sinus congestion, persistent; notes used affrin x3d as directed then stopped.  Taking Zyrtec, no other nasal meds tried.\par +throat discomfort, feels irritated, overall persistent, temporary help with OTC Lozenges.\par +cough with minimal yellow sputum x4d\par \par Denies new joint pains.\par Reports nl appetite and BMs.  Denies GI complaints.\par \par \par Denies fever, reports Temp today 97.8, of note on Etodolac prn (last taken 24 hrs ago)\par Denies chills, nasal d/c, HA, facial pain, dysphagia, odynophagia, CP, sob or rash.\par \par hx grief and recent depression/anxiety - overall feeling better with mood, denies panic attacks/HI or SI\par -on Klonopin prn with help, no off an does not feel needs it any more.\par -hx onset mainly 2/2 mom passing away in 4/20 (was at a NH and endstage COPD). \par -has co-worker that confides in, but still finds does not have someone to talk with regularly. BH eval pending, has resources\par \par Hx rheum f/u 11/20- ongoing joint pains in hands reported, cont'd on med regimen with adherence encouraged. Labs done. Has f/u 1/21.\par -feels joint sx's better since seen by rheum with advised med changes\par -reports hx improved imbalance/HAs (hx negative CTH 3/20)- notes HAs resolved since 5/20.  States saw neuro 11/20, with EMG done told abnl in arms and gabapentin dose increased now 1/1/2 tabs per day, advised PT for balance\par -doing PT BIW, feels it helps\par \par HTN, hx CP (resolved)-\par -on med regimen, adherent\par -seen by cardio, Dr. Rivero 10/20 and is s/p echo 11/20 (unchanged) and NST 11/20 (negative)\par -hx optho eval 1/20, thinks had nl dilated exam then\par

## 2020-12-29 NOTE — ASSESSMENT
[FreeTextEntry1] : 56 yo F pmhx HTN, HLD, preDM, hx ARAGON, MCTD, necrotizing myopathy, vit d insuff, B12 def for acute visi\par \par sinus pressure, cough, postnasal drip- s/p UC eval 12/22/20 with negative covid pcr.  Concern for persistent and slightly worsened sx's.  Nontoxic appearing on limited virtual exam\par -will tx with empiric Augmentin for sinusitis (will also cover CAP)- risks/benefits/SEs discussed.  Pt requests Rx for diflucan for yeast infection as notes hx of use in past with abx when had yeast infection- Rx escribed.\par -flonase prn, risks/benefits/SEs discussed, to f/u with optho for clearance to use.  Advised to cont OFF affrin due to risk for rebound sx's\par -cont zyrtec prn\par -check cxr\par -advised to cont social distancing and precautions for covid prevention \par -advised prompt medical eval if sx's worsen, fever (to monitor pre NSAID), CP, sob, etc.\par -advised to have f/u virtual visit in 1 week to re-assess \par \par hx stress 2/2 mother's death in 4/20- recent depression/anxiety a/w grief- improving; hx mild on PH Q 9, denies HI/SI\par -declines standing mood medication (ie SSRI)\par -on clonazepam with help- refilled prior, istop checked prior. Risks/benefits/SEs discussed. Advised to avoid concurrent use with Ambien (given by rheum, now off), amitriptyline and ETOH given potential additive effects. Advised of need for office f/u for further refills.\par - referral and info for [email or web address] [facility] given prior- pending\par -info for behavioral health crisis walk-in center given prior\par -advised to f/u if sx's worsen\par \par hx MCTD, necrotizing myopathy (hx muscle bx), hx imbalance (improved)/ /HAs (resolved)- \par -followed by rheum, seen 11/20 noted ongoing joint pains in hands reported, cont'd on med regimen with adherence encouraged. Labs done. Has f/u 1/21\par -hx serum sickness with IVIg, avoiding during covid pandemic as high risk with use per pt\par -on CellCept (restarted fall 2019)\par -on rituxan since 4/19\par -on neurontin\par -on amitriptyline qhs for sleep\par -on etodolace since 9/20 (when diclofenac d/c'd by rheum)\par -doing PT \par -3/20 CTH negative\par -followed by neuro, states seen 11/20 with EMG done told abnl in arms and gabapentin dose increased now 1/1/2 tabs per day, advised PT for balance.  Asked to forward record.\par -hx nl optho eval 1/20 per pt\par \par HTN, atypical CP, hx abnl EKG- self resolved per pt\par -7/18 echo: EF 69%, mild MR, borderline pulm HTN, nl LV fxn and size, mild diastolic dysfxn. \par -8/19 echo: EF 65%, nl LV fxn, mild pulm HTN, min MR, mild-mod TR. No PFO.\par -11/20 echo: EF 65-70%, no significant change c/w 7/18\par -11/20 exercise stress test: normal study\par -on amlodipine- benazepril and HCTZ 12.5 mg qday\par -seen by cardio, Dr. Rivero 10/20 and is s/p echo 11/20 (unchanged) and NST 11/20 (negative)\par -followed by optho, hx eval 1/20 with nl dilated exam per pt\par -9/20 UA negative, urine prt/crt wnl\par -9/20 cmp wnl, cbc wnl except plt clumped \par -6/20 plt (blue top) 275, wnl - repeat pending\par -11/20 cmp wnl, cbc wnl, except plt clumped\par -plt check in blue top tube- pending, encouraged\par -low salt diet advised\par -advised prompt medical eval if sx's recur\par \par hx ARAGON- resolved per pt\par -seen by pulm 7/18, states called and told was "age appropriate" and advised increased exercise only. Office f/u pending.\par -seen by cardio, Dr. Rivero 10/20 and is s/p echo 11/20 (unchanged) and NST 11/20 (negative)\par \par preDM- 11/20 A1c 5.8 (was 5.6) \par -ADA diet, exercise encouraged\par -check A1c in 3mo (2/21)\par \par HLD- 11/20 Tchol 244 TG 41  HDL 96; ASCV risk < 7.5\par -low fat diet, exercise encouraged\par \par GERD, IBS/constipation, hx hemorrhoids-\par -followed by GI\par -hx c-scope by Dr. Hammond in 4/18- told nl, advised repeat in 5 yrs.\par -hx omeprazole prn\par -on metamucil prn\par -on prepH prn\par -asked to forward records\par \par hx vit d def-\par -11/20 vit d wnl\par -on OTC supp\par \par hx B12 def-\par -11/20 B12/folate wnl\par -on oral supplement\par \par hx skin rash, itching- dx'd eczematous dermatitis by derm 3/19 with amitriptyline dose increased. Resolved.\par -followed by derm\par -on amitriptyline\par -hx FBSE 9/18, yearly advised. Regular use of sun block for skin cancer prevention advised.\par \par \par \par HCM\par -hx CPE 10/20\par -hx negative hep C screening 4/20\par -hx flu shot 9/20\par -hx Tdap 8/18\par -advised to check on shingrix insurance coverage and get okay to get from rheum prior to considering\par -hx negative PAP 8/19 by GYN per pt\par -hx negative mammo 8/20\par -hx nl DEXA 12/19\par -hx screening colonoscopy 4/18, rec: repeat in 5 yrs\par \par Pt has prior scheduled office f/u visit 1/21/21.\par

## 2020-12-29 NOTE — REVIEW OF SYSTEMS
[Negative] : Integumentary [FreeTextEntry4] : see HPI [FreeTextEntry6] : see HPI [FreeTextEntry9] : see HPI [de-identified] : see HPI [de-identified] : see HPI

## 2020-12-29 NOTE — PHYSICAL EXAM
[No Acute Distress] : no acute distress [Well-Appearing] : well-appearing [Supple] : supple [Normal Affect] : the affect was normal [Alert and Oriented x3] : oriented to person, place, and time [de-identified] : no photophobia [de-identified] : no sinus tenderness on patient self exam

## 2020-12-30 ENCOUNTER — RX RENEWAL (OUTPATIENT)
Age: 55
End: 2020-12-30

## 2020-12-30 ENCOUNTER — TRANSCRIPTION ENCOUNTER (OUTPATIENT)
Age: 55
End: 2020-12-30

## 2021-01-04 ENCOUNTER — TRANSCRIPTION ENCOUNTER (OUTPATIENT)
Age: 56
End: 2021-01-04

## 2021-01-06 ENCOUNTER — TRANSCRIPTION ENCOUNTER (OUTPATIENT)
Age: 56
End: 2021-01-06

## 2021-01-06 ENCOUNTER — OUTPATIENT (OUTPATIENT)
Dept: OUTPATIENT SERVICES | Facility: HOSPITAL | Age: 56
LOS: 1 days | End: 2021-01-06
Payer: COMMERCIAL

## 2021-01-06 ENCOUNTER — APPOINTMENT (OUTPATIENT)
Dept: RADIOLOGY | Facility: CLINIC | Age: 56
End: 2021-01-06
Payer: COMMERCIAL

## 2021-01-06 DIAGNOSIS — Z98.890 OTHER SPECIFIED POSTPROCEDURAL STATES: Chronic | ICD-10-CM

## 2021-01-06 DIAGNOSIS — Z98.89 OTHER SPECIFIED POSTPROCEDURAL STATES: Chronic | ICD-10-CM

## 2021-01-06 DIAGNOSIS — Z98.51 TUBAL LIGATION STATUS: Chronic | ICD-10-CM

## 2021-01-06 DIAGNOSIS — Z00.8 ENCOUNTER FOR OTHER GENERAL EXAMINATION: ICD-10-CM

## 2021-01-06 PROCEDURE — 71046 X-RAY EXAM CHEST 2 VIEWS: CPT | Mod: 26

## 2021-01-06 PROCEDURE — 71046 X-RAY EXAM CHEST 2 VIEWS: CPT

## 2021-01-07 ENCOUNTER — RX RENEWAL (OUTPATIENT)
Age: 56
End: 2021-01-07

## 2021-01-07 ENCOUNTER — APPOINTMENT (OUTPATIENT)
Dept: INTERNAL MEDICINE | Facility: CLINIC | Age: 56
End: 2021-01-07
Payer: COMMERCIAL

## 2021-01-07 PROCEDURE — 99213 OFFICE O/P EST LOW 20 MIN: CPT | Mod: 95

## 2021-01-07 RX ORDER — ZOLPIDEM TARTRATE 6.25 MG/1
6.25 TABLET, EXTENDED RELEASE ORAL
Qty: 10 | Refills: 0 | Status: DISCONTINUED | COMMUNITY
Start: 2020-09-18 | End: 2021-01-07

## 2021-01-07 NOTE — HISTORY OF PRESENT ILLNESS
[Home] : at home, [unfilled] , at the time of the visit. [Medical Office: ([facility])___] : at the medical office located in  [Verbal consent obtained from patient] : the patient, [unfilled] [FreeTextEntry1] : f/u [de-identified] : As this is a telemedicine visit, no physical exam could be performed. Diagnosis and treatment is based on symptoms provided.\par \par \par 56 yo F pmhx HTN, HLD, preDM, hx ARAGON, MCTD, necrotizing myopathy, vit d insuff, B12 def for f/u\par \par Last encounter 12/29/20 via telephone for acute visit c/o sinus pressure, cough and postnasal drip s/p UC visit\par -Augmentin given (diflucan prn yeast infection also given per pt request)\par -cxr ordered (found negative)\par \par Last seen 10/13/20 in office for CPE with labs ordered (pending, done with rheum).\par \par \par Today reports feeling 95% better, only current sx is minimal clear rhinitis and occasional post nasal drip. Cough and sore throat resolved.\par -on flonase and zyrtec prn, no recent use\par -finished Augment course 2d ago, denies any use of prior requested diflucan for possible yeat infection as has no sx's\par \par Reports nl appetite and BMs.  Denies GI or  complaints.\par \par Denies fever, reports Temp today 98.1 (done at time of visit), was 98.8 two days ago when last checked), of note on Etodolac prn (last taken 24 hrs ago)\par Denies chills, HA, facial pain, dysphagia, odynophagia, CP, sob or rash.\par \par Reports is socially distancing and using precautions for covid prevention.  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 \par -has co-worker that confides in, but still finds does not have someone to talk with regularly. BH eval pending, has resources\par \par Hx rheum f/u 11/20- ongoing joint pains in hands reported, cont'd on med regimen with adherence encouraged. Labs done. Has f/u 1/21.\par -feels joint sx's better since seen by rheum with advised med changes\par -reports hx improved imbalance/HAs (hx negative CTH 3/20)- notes HAs resolved since 5/20.  States saw neuro 11/20, with EMG done told abnl in arms and gabapentin dose increased now 1/1/2 tabs per day, advised PT for balance\par -doing PT BIW, feels it helps\par \par HTN, hx CP (resolved)-\par -on med regimen, adherent\par -seen by cardio, Dr. Rivero 10/20 and is s/p echo 11/20 (unchanged) and NST 11/20 (negative)\par -hx optho eval 1/20, thinks had nl dilated exam then\par

## 2021-01-07 NOTE — ASSESSMENT
[FreeTextEntry1] : \par 54 yo F pmhx HTN, HLD, preDM, hx ARAGON, MCTD, necrotizing myopathy, vit d insuff, B12 def for f/u\par \par hx sinus pressure, cough, postnasal drip- s/p UC eval 12/22/20 with negative covid pcr.  Near resolved sx's.  Well appearing.\par -s/p Augmentin course (never used diflucan given if had yeast infection concurrent with use)\par -1/21 cxr: clear lungs\par -on flonase prn and Zyrtec prn\par -advised to cont social distancing and precautions for covid prevention \par -advised prompt medical eval if sx's worsen, fever (to monitor pre NSAID), CP, sob, etc. \par \par hx stress 2/2 mother's death in 4/20- recent depression/anxiety a/w grief- improving; hx mild on PH Q 9, denies HI/SI\par -declines standing mood medication (ie SSRI)\par -on clonazepam with help- refilled prior, istop checked prior. Risks/benefits/SEs discussed. Advised to avoid concurrent use with Ambien (given by rheum, now off), amitriptyline and ETOH given potential additive effects. Advised of need for office f/u for further refills.\par - referral and info for [facility] given prior- pending\par -info for behavioral health crisis walk-in center given prior\par -advised to f/u if sx's worsen\par \par hx MCTD, necrotizing myopathy (hx muscle bx), hx imbalance (improved)/ /HAs (resolved)- \par -followed by rheum, seen 11/20 noted ongoing joint pains in hands reported, cont'd on med regimen with adherence encouraged. Labs done. Has f/u 1/21\par -hx serum sickness with IVIg, avoiding during covid pandemic as high risk with use per pt\par -on CellCept (restarted fall 2019)\par -on rituxan since 4/19\par -on neurontin\par -on amitriptyline qhs for sleep\par -on etodolace since 9/20 (when diclofenac d/c'd by rheum)\par -doing PT \par -3/20 CTH negative\par -followed by neuro, states seen 11/20 with EMG done told abnl in arms and gabapentin dose increased now 1/1/2 tabs per day, advised PT for balance.  Asked to forward record.\par -hx nl optho eval 1/20 per pt\par \par HTN, atypical CP, hx abnl EKG- self resolved per pt\par -7/18 echo: EF 69%, mild MR, borderline pulm HTN, nl LV fxn and size, mild diastolic dysfxn. \par -8/19 echo: EF 65%, nl LV fxn, mild pulm HTN, min MR, mild-mod TR. No PFO.\par -11/20 echo: EF 65-70%, no significant change c/w 7/18\par -11/20 exercise stress test: normal study\par -on amlodipine- benazepril and HCTZ 12.5 mg qday\par -seen by cardio, Dr. Rivero 10/20 and is s/p echo 11/20 (unchanged) and NST 11/20 (negative)\par -followed by optho, hx eval 1/20 with nl dilated exam per pt\par -9/20 UA negative, urine prt/crt wnl\par -9/20 cmp wnl, cbc wnl except plt clumped \par -6/20 plt (blue top) 275, wnl - repeat pending\par -11/20 cmp wnl, cbc wnl, except plt clumped\par -plt check in blue top tube- pending, encouraged\par -low salt diet advised\par -advised prompt medical eval if sx's recur\par \par hx ARAGON- resolved per pt\par -seen by pulm 7/18, states called and told was "age appropriate" and advised increased exercise only. Office f/u pending.\par -seen by cardio, Dr. Rivero 10/20 and is s/p echo 11/20 (unchanged) and NST 11/20 (negative)\par \par preDM- 11/20 A1c 5.8 (was 5.6) \par -ADA diet, exercise encouraged\par -check A1c in 3mo (2/21)\par \par HLD- 11/20 Tchol 244 TG 41  HDL 96; ASCV risk < 7.5\par -low fat diet, exercise encouraged\par \par GERD, IBS/constipation, hx hemorrhoids-\par -followed by GI\par -hx c-scope by Dr. Hammond in 4/18- told nl, advised repeat in 5 yrs.\par -hx omeprazole prn\par -on metamucil prn\par -on prepH prn\par -asked to forward records\par \par hx vit d def-\par -11/20 vit d wnl\par -on OTC supp\par \par hx B12 def-\par -11/20 B12/folate wnl\par -on oral supplement\par \par hx skin rash, itching- dx'd eczematous dermatitis by derm 3/19 with amitriptyline dose increased. Resolved.\par -followed by derm\par -on amitriptyline\par -hx FBSE 9/18, yearly advised. Regular use of sun block for skin cancer prevention advised.\par \par \par \par HCM\par -hx CPE 10/20\par -hx negative hep C screening 4/20\par -hx flu shot 9/20\par -hx Tdap 8/18\par -advised to check on shingrix insurance coverage and get okay to get from rheum prior to considering\par -hx negative PAP 8/19 by GYN per pt, has f/u 8/21\par -hx negative mammo 8/20\par -hx nl DEXA 12/19\par -hx screening colonoscopy 4/18, rec: repeat in 5 yrs\par \par Pt has prior scheduled office f/u visit 1/21/21.  Advised to f/u sooner as needed.\par

## 2021-01-08 ENCOUNTER — TRANSCRIPTION ENCOUNTER (OUTPATIENT)
Age: 56
End: 2021-01-08

## 2021-01-09 ENCOUNTER — LABORATORY RESULT (OUTPATIENT)
Age: 56
End: 2021-01-09

## 2021-01-10 RX ORDER — AMOXICILLIN AND CLAVULANATE POTASSIUM 875; 125 MG/1; MG/1
875-125 TABLET, COATED ORAL
Qty: 14 | Refills: 0 | Status: DISCONTINUED | COMMUNITY
Start: 2020-12-29 | End: 2021-01-10

## 2021-01-11 LAB
25(OH)D3 SERPL-MCNC: 59.3 NG/ML
ALBUMIN SERPL ELPH-MCNC: 4.5 G/DL
ALP BLD-CCNC: 60 U/L
ALT SERPL-CCNC: 10 U/L
ANION GAP SERPL CALC-SCNC: 14 MMOL/L
AST SERPL-CCNC: 14 U/L
BASOPHILS # BLD AUTO: 0.04 K/UL
BASOPHILS NFR BLD AUTO: 0.6 %
BILIRUB SERPL-MCNC: 0.4 MG/DL
BUN SERPL-MCNC: 20 MG/DL
CALCIUM SERPL-MCNC: 9.5 MG/DL
CHLORIDE SERPL-SCNC: 104 MMOL/L
CHOLEST SERPL-MCNC: 234 MG/DL
CO2 SERPL-SCNC: 23 MMOL/L
CREAT SERPL-MCNC: 0.81 MG/DL
EOSINOPHIL # BLD AUTO: 0.1 K/UL
EOSINOPHIL NFR BLD AUTO: 1.5 %
ESTIMATED AVERAGE GLUCOSE: 117 MG/DL
FOLATE SERPL-MCNC: 12.5 NG/ML
GLUCOSE SERPL-MCNC: 103 MG/DL
HBA1C MFR BLD HPLC: 5.7 %
HCT VFR BLD CALC: 43.8 %
HDLC SERPL-MCNC: 79 MG/DL
HGB BLD-MCNC: 13.5 G/DL
IMM GRANULOCYTES NFR BLD AUTO: 0.3 %
LDLC SERPL CALC-MCNC: 140 MG/DL
LYMPHOCYTES # BLD AUTO: 2.32 K/UL
LYMPHOCYTES NFR BLD AUTO: 34.2 %
MAN DIFF?: NORMAL
MCHC RBC-ENTMCNC: 27.2 PG
MCHC RBC-ENTMCNC: 30.8 GM/DL
MCV RBC AUTO: 88.3 FL
MONOCYTES # BLD AUTO: 0.45 K/UL
MONOCYTES NFR BLD AUTO: 6.6 %
NEUTROPHILS # BLD AUTO: 3.85 K/UL
NEUTROPHILS NFR BLD AUTO: 56.8 %
NONHDLC SERPL-MCNC: 155 MG/DL
PLATELET # BLD AUTO: NORMAL K/UL
PLATELET # PLAS AUTO: 206 K/UL
POTASSIUM SERPL-SCNC: 4 MMOL/L
PROT SERPL-MCNC: 6.8 G/DL
RBC # BLD: 4.96 M/UL
RBC # FLD: 14.6 %
SODIUM SERPL-SCNC: 142 MMOL/L
TRIGL SERPL-MCNC: 78 MG/DL
TSH SERPL-ACNC: 3.05 UIU/ML
VIT B12 SERPL-MCNC: 698 PG/ML
WBC # FLD AUTO: 6.78 K/UL

## 2021-01-12 ENCOUNTER — NON-APPOINTMENT (OUTPATIENT)
Age: 56
End: 2021-01-12

## 2021-01-12 LAB — SARS-COV-2 N GENE NPH QL NAA+PROBE: NOT DETECTED

## 2021-01-21 ENCOUNTER — APPOINTMENT (OUTPATIENT)
Dept: RHEUMATOLOGY | Facility: CLINIC | Age: 56
End: 2021-01-21
Payer: COMMERCIAL

## 2021-01-21 ENCOUNTER — TRANSCRIPTION ENCOUNTER (OUTPATIENT)
Age: 56
End: 2021-01-21

## 2021-01-21 VITALS
SYSTOLIC BLOOD PRESSURE: 157 MMHG | BODY MASS INDEX: 23.46 KG/M2 | WEIGHT: 146 LBS | TEMPERATURE: 97.3 F | OXYGEN SATURATION: 97 % | HEIGHT: 66 IN | HEART RATE: 63 BPM | DIASTOLIC BLOOD PRESSURE: 86 MMHG

## 2021-01-21 PROCEDURE — 99214 OFFICE O/P EST MOD 30 MIN: CPT | Mod: GC

## 2021-01-21 PROCEDURE — 99072 ADDL SUPL MATRL&STAF TM PHE: CPT

## 2021-01-22 ENCOUNTER — TRANSCRIPTION ENCOUNTER (OUTPATIENT)
Age: 56
End: 2021-01-22

## 2021-01-22 LAB
CK SERPL-CCNC: 107 U/L
CRP SERPL-MCNC: <0.1 MG/DL
ERYTHROCYTE [SEDIMENTATION RATE] IN BLOOD BY WESTERGREN METHOD: 16 MM/HR
SARS-COV-2 IGG SERPL IA-ACNC: 0.09 INDEX
SARS-COV-2 IGG SERPL QL IA: NEGATIVE

## 2021-01-26 ENCOUNTER — TRANSCRIPTION ENCOUNTER (OUTPATIENT)
Age: 56
End: 2021-01-26

## 2021-01-27 ENCOUNTER — APPOINTMENT (OUTPATIENT)
Dept: INTERNAL MEDICINE | Facility: CLINIC | Age: 56
End: 2021-01-27
Payer: COMMERCIAL

## 2021-01-27 VITALS
SYSTOLIC BLOOD PRESSURE: 112 MMHG | RESPIRATION RATE: 16 BRPM | OXYGEN SATURATION: 98 % | BODY MASS INDEX: 23.4 KG/M2 | HEART RATE: 81 BPM | DIASTOLIC BLOOD PRESSURE: 72 MMHG | TEMPERATURE: 98.3 F | WEIGHT: 145 LBS

## 2021-01-27 PROCEDURE — 99072 ADDL SUPL MATRL&STAF TM PHE: CPT

## 2021-01-27 PROCEDURE — 99214 OFFICE O/P EST MOD 30 MIN: CPT

## 2021-01-27 NOTE — HISTORY OF PRESENT ILLNESS
[FreeTextEntry1] : f/u\par \par  [de-identified] : \par 54 yo F pmhx HTN, HLD, preDM, hx ARAGON, MCTD, necrotizing myopathy, vit d insuff, B12 def for f/u\par \par Feeling well.\par Needs med refill.\par \par Reports is socially distancing and using precautions for covid prevention.  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 Resolved.\par -s/p Augmentin course (never used diflucan given if had yeast infection concurrent with use)\par -1/21 cxr: clear lungs\par -hx negative pcr screening \par \par Reports nl appetite and BMs.  \par Denies GI or  complaints.\par \par \par hx MCTD, necrotizing myopathy (hx muscle bx), hx imbalance (improved)/ /HAs (resolved)- stable\par -followed by rheum, seen 1/21 noted ongoing joint pains in hands reported, cont'd on med regimen with adherence encouraged. Labs done. Has f/u 4/21\par -reports hx improved imbalance/HAs (hx negative CTH 3/20)- notes HAs resolved since 5/20.  States saw neuro 11/20, with EMG done told abnl in arms and gabapentin dose increased now 1/1/2 tabs per day, advised PT for balance\par -doing PT BIW, feels it helps\par \par hx grief and recent depression/anxiety - reports increased anxiety level (feel is "mild") in past week along with trouble sleeping as result, notes had gone to visit mom's grave, also brother just told her moving to [place] and he is only family has left that is near her currently.  Denies panic attacks/HI or SI\par -on Klonopin prn with help, no off - requests refill\par -hx onset mainly 2/2 mom passing away in 4/20 (was at a NH and endstage COPD). \par -has co-worker that confides in, but still finds does not have someone to talk with regularly.  eval pending, has resources\par \par HTN, hx CP (resolved)-\par -on med regimen, adherent\par -seen by cardio, Dr. Rivero 10/20 and is s/p echo 11/20 (unchanged) and NST 11/20 (negative)\par -hx optho eval 1/20, thinks had nl dilated exam then\par

## 2021-01-27 NOTE — PHYSICAL EXAM
[Well-Appearing] : well-appearing [Normal Sclera/Conjunctiva] : normal sclera/conjunctiva [PERRL] : pupils equal round and reactive to light [EOMI] : extraocular movements intact [Normal Outer Ear/Nose] : the outer ears and nose were normal in appearance [Normal Oropharynx] : the oropharynx was normal [Normal Nasal Mucosa] : the nasal mucosa was normal [No Lymphadenopathy] : no lymphadenopathy [Supple] : supple [Thyroid Normal, No Nodules] : the thyroid was normal and there were no nodules present [No Respiratory Distress] : no respiratory distress  [Clear to Auscultation] : lungs were clear to auscultation bilaterally [Normal Rate] : normal rate  [Normal S1, S2] : normal S1 and S2 [Regular Rhythm] : with a regular rhythm [No Murmur] : no murmur heard [Pedal Pulses Present] : the pedal pulses are present [No Edema] : there was no peripheral edema [Soft] : abdomen soft [Non Tender] : non-tender [No HSM] : no HSM [Normal Supraclavicular Nodes] : no supraclavicular lymphadenopathy [Normal Posterior Cervical Nodes] : no posterior cervical lymphadenopathy [Normal Anterior Cervical Nodes] : no anterior cervical lymphadenopathy [No CVA Tenderness] : no CVA  tenderness [No Spinal Tenderness] : no spinal tenderness [No Joint Swelling] : no joint swelling [Grossly Normal Strength/Tone] : grossly normal strength/tone [No Rash] : no rash [No Focal Deficits] : no focal deficits [Normal Gait] : normal gait [Normal Affect] : the affect was normal [Alert and Oriented x3] : oriented to person, place, and time [de-identified] : tearful at times

## 2021-01-27 NOTE — ASSESSMENT
[FreeTextEntry1] : \par 54 yo F pmhx HTN, HLD, preDM, hx ARAGON, MCTD, necrotizing myopathy, vit d insuff, B12 def for f/u\par \par hx sinus pressure, cough, postnasal drip- s/p UC eval 12/22/20 with negative covid pcr. Resolved.\par -s/p Augmentin course (never used diflucan given if had yeast infection concurrent with use)\par -1/21 cxr: clear lungs\par -on flonase prn and Zyrtec prn\par -advised to cont social distancing and precautions for covid prevention \par -advised prompt medical eval if sx's recur\par \par hx stress 2/2 mother's death in 4/20- recent depression/anxiety a/w grief- recent mild anxiety, denies acute sx's\par -declines standing mood medication (ie SSRI)\par -on clonazepam with help- refilled , istop checked Risks/benefits/SEs discussed. Advised to avoid concurrent use with Ambien (given by rheum, now off), amitriptyline and ETOH given potential additive effects. Advised of need for office f/u for further refills.\par - referral and info for [facility] given prior- pending\par -info for behavioral health crisis walk-in center given prior (confirms has info)\par -advised to f/u if sx's worsen\par \par hx MCTD, necrotizing myopathy (hx muscle bx), hx imbalance (improved)/ /HAs (resolved)- \par -followed by rheum, seen 1/21 noted ongoing joint pains in hands reported, cont'd on med regimen with adherence encouraged. Labs done. Has f/u 4/21\par -hx serum sickness with IVIg, avoiding during covid pandemic as high risk with use per pt\par -on CellCept (restarted fall 2019)\par -on rituxan since 4/19\par -on neurontin\par -on amitriptyline qhs for sleep\par -on etodolace since 9/20 (when diclofenac d/c'd by rheum)\par -doing PT \par -3/20 CTH negative\par -followed by neuro, states seen 11/20 with EMG done told abnl in arms and gabapentin dose increased now 1/1/2 tabs per day, advised PT for balance.  Asked to forward record.\par -hx nl optho eval 1/20 per pt\par \par HTN, atypical CP, hx abnl EKG- self resolved per pt\par -7/18 echo: EF 69%, mild MR, borderline pulm HTN, nl LV fxn and size, mild diastolic dysfxn. \par -8/19 echo: EF 65%, nl LV fxn, mild pulm HTN, min MR, mild-mod TR. No PFO.\par -11/20 echo: EF 65-70%, no significant change c/w 7/18\par -11/20 exercise stress test: normal study\par -on amlodipine- benazepril and HCTZ 12.5 mg qday\par -seen by cardio, Dr. Rivero 10/20 and is s/p echo 11/20 (unchanged) and NST 11/20 (negative)\par -followed by optho, hx eval 1/20 with nl dilated exam per pt\par -9/20 UA negative, urine prt/crt wnl\par -9/20 cmp wnl, cbc wnl except plt clumped \par -6/20 plt (blue top) 275, wnl - repeat pending\par -1/21cmp wnl, cbc wnl, except plt clumped\par -1/21 plt check in blue top tube- 206 wnl\par -low salt diet advised\par -advised prompt medical eval if sx's recur\par \par hx ARAGON- resolved per pt\par -seen by pulm 7/18, states called and told was "age appropriate" and advised increased exercise only. Office f/u pending.\par -seen by cardio, Dr. Rivero 10/20 and is s/p echo 11/20 (unchanged) and NST 11/20 (negative)\par \par preDM- 1/21 A1c 5.7 (was 5.8) \par -ADA diet, exercise encouraged\par -check A1c in 3mo \par \par HLD- 1/21 Tchol 234 TG 78  HDL 79; ASCV risk < 7.5\par -low fat diet, exercise encouraged\par \par GERD, IBS/constipation, hx hemorrhoids-\par -followed by GI\par -hx c-scope by Dr. Hammond in 4/18- told nl, advised repeat in 5 yrs.\par -hx omeprazole prn\par -on metamucil prn\par -on prepH prn\par -asked to forward records\par \par hx vit d def-\par -on OTC supp\par -1/21 vit d wnl\par \par hx B12 def-\par -1/21 B12/folate wnl\par -on oral supplement\par \par hx skin rash, itching- dx'd eczematous dermatitis by derm 3/19 with amitriptyline dose increased. Resolved.\par -followed by derm\par -on amitriptyline\par -hx FBSE 9/18, yearly advised. Regular use of sun block for skin cancer prevention advised.\par \par \par \par HCM\par -hx CPE 10/20\par -hx negative hep C screening 4/20\par -hx flu shot 9/20\par -hx Tdap 8/18\par -advised to check on shingrix insurance coverage and get okay to get from rheum prior to considering\par -hx negative PAP 8/19 by GYN per pt, has f/u 8/21\par -hx negative mammo 8/20\par -hx nl DEXA 12/19\par -hx screening colonoscopy 4/18, rec: repeat in 5 yrs\par \par

## 2021-01-27 NOTE — REVIEW OF SYSTEMS
[Negative] : Integumentary [FreeTextEntry9] : see HPI [de-identified] : see HPI [de-identified] : see HPI

## 2021-01-28 ENCOUNTER — TRANSCRIPTION ENCOUNTER (OUTPATIENT)
Age: 56
End: 2021-01-28

## 2021-01-29 ENCOUNTER — TRANSCRIPTION ENCOUNTER (OUTPATIENT)
Age: 56
End: 2021-01-29

## 2021-01-29 ENCOUNTER — NON-APPOINTMENT (OUTPATIENT)
Age: 56
End: 2021-01-29

## 2021-02-01 ENCOUNTER — TRANSCRIPTION ENCOUNTER (OUTPATIENT)
Age: 56
End: 2021-02-01

## 2021-02-03 ENCOUNTER — TRANSCRIPTION ENCOUNTER (OUTPATIENT)
Age: 56
End: 2021-02-03

## 2021-02-05 ENCOUNTER — TRANSCRIPTION ENCOUNTER (OUTPATIENT)
Age: 56
End: 2021-02-05

## 2021-02-22 ENCOUNTER — TRANSCRIPTION ENCOUNTER (OUTPATIENT)
Age: 56
End: 2021-02-22

## 2021-02-24 ENCOUNTER — TRANSCRIPTION ENCOUNTER (OUTPATIENT)
Age: 56
End: 2021-02-24

## 2021-02-25 ENCOUNTER — TRANSCRIPTION ENCOUNTER (OUTPATIENT)
Age: 56
End: 2021-02-25

## 2021-02-25 ENCOUNTER — NON-APPOINTMENT (OUTPATIENT)
Age: 56
End: 2021-02-25

## 2021-03-04 ENCOUNTER — NON-APPOINTMENT (OUTPATIENT)
Age: 56
End: 2021-03-04

## 2021-03-04 ENCOUNTER — TRANSCRIPTION ENCOUNTER (OUTPATIENT)
Age: 56
End: 2021-03-04

## 2021-03-26 ENCOUNTER — TRANSCRIPTION ENCOUNTER (OUTPATIENT)
Age: 56
End: 2021-03-26

## 2021-04-27 ENCOUNTER — TRANSCRIPTION ENCOUNTER (OUTPATIENT)
Age: 56
End: 2021-04-27

## 2021-04-27 ENCOUNTER — APPOINTMENT (OUTPATIENT)
Dept: RHEUMATOLOGY | Facility: CLINIC | Age: 56
End: 2021-04-27
Payer: COMMERCIAL

## 2021-04-27 ENCOUNTER — LABORATORY RESULT (OUTPATIENT)
Age: 56
End: 2021-04-27

## 2021-04-27 VITALS
BODY MASS INDEX: 23.46 KG/M2 | HEIGHT: 66 IN | SYSTOLIC BLOOD PRESSURE: 142 MMHG | WEIGHT: 146 LBS | OXYGEN SATURATION: 99 % | HEART RATE: 66 BPM | DIASTOLIC BLOOD PRESSURE: 87 MMHG | TEMPERATURE: 97.6 F

## 2021-04-27 PROCEDURE — 99215 OFFICE O/P EST HI 40 MIN: CPT

## 2021-04-27 PROCEDURE — 99072 ADDL SUPL MATRL&STAF TM PHE: CPT

## 2021-04-28 ENCOUNTER — TRANSCRIPTION ENCOUNTER (OUTPATIENT)
Age: 56
End: 2021-04-28

## 2021-04-28 LAB
25(OH)D3 SERPL-MCNC: 34.5 NG/ML
ALBUMIN SERPL ELPH-MCNC: 4.6 G/DL
ALP BLD-CCNC: 71 U/L
ALT SERPL-CCNC: 11 U/L
ANION GAP SERPL CALC-SCNC: 15 MMOL/L
APPEARANCE: CLEAR
AST SERPL-CCNC: 15 U/L
BACTERIA: NEGATIVE
BASOPHILS # BLD AUTO: 0.04 K/UL
BASOPHILS NFR BLD AUTO: 0.6 %
BILIRUB SERPL-MCNC: 0.8 MG/DL
BILIRUBIN URINE: NEGATIVE
BLOOD URINE: NEGATIVE
BUN SERPL-MCNC: 8 MG/DL
CALCIUM SERPL-MCNC: 9.7 MG/DL
CHLORIDE SERPL-SCNC: 103 MMOL/L
CK SERPL-CCNC: 102 U/L
CO2 SERPL-SCNC: 24 MMOL/L
COLOR: NORMAL
COVID-19 SPIKE DOMAIN ANTIBODY INTERPRETATION: POSITIVE
CREAT SERPL-MCNC: 0.71 MG/DL
CREAT SPEC-SCNC: 73 MG/DL
CREAT/PROT UR: 0.1 RATIO
EOSINOPHIL # BLD AUTO: 0.06 K/UL
EOSINOPHIL NFR BLD AUTO: 0.8 %
ERYTHROCYTE [SEDIMENTATION RATE] IN BLOOD BY WESTERGREN METHOD: 32 MM/HR
GLUCOSE QUALITATIVE U: NEGATIVE
GLUCOSE SERPL-MCNC: 94 MG/DL
HAV IGM SER QL: NONREACTIVE
HBV CORE IGG+IGM SER QL: NONREACTIVE
HBV CORE IGM SER QL: NONREACTIVE
HBV SURFACE AG SER QL: NONREACTIVE
HCT VFR BLD CALC: 42.6 %
HCV AB SER QL: NONREACTIVE
HCV S/CO RATIO: 0.14 S/CO
HGB BLD-MCNC: 13.1 G/DL
HYALINE CASTS: 9 /LPF
IMM GRANULOCYTES NFR BLD AUTO: 0.3 %
KETONES URINE: NEGATIVE
LEUKOCYTE ESTERASE URINE: ABNORMAL
LYMPHOCYTES # BLD AUTO: 1.77 K/UL
LYMPHOCYTES NFR BLD AUTO: 24.8 %
MAN DIFF?: NORMAL
MCHC RBC-ENTMCNC: 27.6 PG
MCHC RBC-ENTMCNC: 30.8 GM/DL
MCV RBC AUTO: 89.7 FL
MICROSCOPIC-UA: NORMAL
MONOCYTES # BLD AUTO: 0.44 K/UL
MONOCYTES NFR BLD AUTO: 6.2 %
NEUTROPHILS # BLD AUTO: 4.8 K/UL
NEUTROPHILS NFR BLD AUTO: 67.3 %
NITRITE URINE: NEGATIVE
PH URINE: 6.5
PLATELET # BLD AUTO: NORMAL K/UL
POTASSIUM SERPL-SCNC: 4.1 MMOL/L
PROT SERPL-MCNC: 7.2 G/DL
PROT UR-MCNC: 5 MG/DL
PROTEIN URINE: NORMAL
RBC # BLD: 4.75 M/UL
RBC # FLD: 15.1 %
RED BLOOD CELLS URINE: 2 /HPF
SARS-COV-2 AB SERPL IA-ACNC: >250 U/ML
SODIUM SERPL-SCNC: 141 MMOL/L
SPECIFIC GRAVITY URINE: 1.02
SQUAMOUS EPITHELIAL CELLS: 12 /HPF
UROBILINOGEN URINE: NORMAL
WBC # FLD AUTO: 7.13 K/UL
WHITE BLOOD CELLS URINE: 25 /HPF

## 2021-04-29 LAB
C3 SERPL-MCNC: 118 MG/DL
C4 SERPL-MCNC: 50 MG/DL
M TB IFN-G BLD-IMP: NEGATIVE
QUANTIFERON TB PLUS MITOGEN MINUS NIL: 7.38 IU/ML
QUANTIFERON TB PLUS NIL: 0.02 IU/ML
QUANTIFERON TB PLUS TB1 MINUS NIL: 0 IU/ML
QUANTIFERON TB PLUS TB2 MINUS NIL: 0.01 IU/ML

## 2021-04-30 ENCOUNTER — TRANSCRIPTION ENCOUNTER (OUTPATIENT)
Age: 56
End: 2021-04-30

## 2021-04-30 LAB — DRUG ABUSE PANEL-9, SERUM: NORMAL

## 2021-05-05 ENCOUNTER — APPOINTMENT (OUTPATIENT)
Dept: INTERNAL MEDICINE | Facility: CLINIC | Age: 56
End: 2021-05-05

## 2021-05-13 ENCOUNTER — TRANSCRIPTION ENCOUNTER (OUTPATIENT)
Age: 56
End: 2021-05-13

## 2021-05-14 ENCOUNTER — NON-APPOINTMENT (OUTPATIENT)
Age: 56
End: 2021-05-14

## 2021-05-14 ENCOUNTER — TRANSCRIPTION ENCOUNTER (OUTPATIENT)
Age: 56
End: 2021-05-14

## 2021-06-16 ENCOUNTER — TRANSCRIPTION ENCOUNTER (OUTPATIENT)
Age: 56
End: 2021-06-16

## 2021-06-30 ENCOUNTER — TRANSCRIPTION ENCOUNTER (OUTPATIENT)
Age: 56
End: 2021-06-30

## 2021-07-07 ENCOUNTER — TRANSCRIPTION ENCOUNTER (OUTPATIENT)
Age: 56
End: 2021-07-07

## 2021-07-27 ENCOUNTER — NON-APPOINTMENT (OUTPATIENT)
Age: 56
End: 2021-07-27

## 2021-07-27 ENCOUNTER — APPOINTMENT (OUTPATIENT)
Dept: INTERNAL MEDICINE | Facility: CLINIC | Age: 56
End: 2021-07-27
Payer: COMMERCIAL

## 2021-07-27 VITALS
HEIGHT: 66 IN | RESPIRATION RATE: 16 BRPM | DIASTOLIC BLOOD PRESSURE: 76 MMHG | WEIGHT: 145 LBS | BODY MASS INDEX: 23.3 KG/M2 | OXYGEN SATURATION: 98 % | HEART RATE: 60 BPM | SYSTOLIC BLOOD PRESSURE: 122 MMHG

## 2021-07-27 DIAGNOSIS — Z20.822 CONTACT WITH AND (SUSPECTED) EXPOSURE TO COVID-19: ICD-10-CM

## 2021-07-27 PROCEDURE — 99214 OFFICE O/P EST MOD 30 MIN: CPT | Mod: 25

## 2021-07-27 PROCEDURE — 93000 ELECTROCARDIOGRAM COMPLETE: CPT

## 2021-07-27 RX ORDER — FLUCONAZOLE 150 MG/1
150 TABLET ORAL
Qty: 1 | Refills: 1 | Status: DISCONTINUED | COMMUNITY
Start: 2020-12-29 | End: 2021-07-27

## 2021-07-27 RX ORDER — AMITRIPTYLINE HYDROCHLORIDE 25 MG/1
25 TABLET, FILM COATED ORAL
Qty: 30 | Refills: 1 | Status: DISCONTINUED | COMMUNITY
Start: 2019-03-25 | End: 2021-07-27

## 2021-07-27 NOTE — HISTORY OF PRESENT ILLNESS
[FreeTextEntry1] : \par f/u [de-identified] : \par 57 yo F pmhx HTN, HLD, preDM, hx ARAGON, MCTD, necrotizing myopathy, vit d insuff, B12 def for f/u\par \par Feeling well.\par Needs med refill.\par Fasting.\par \par Reports is socially distancing and using precautions for covid prevention.\par Denies sick or covid positive contacts.\par -hx Moderna vaccine- 1/28, 2/25/21\par \par c/o chest discomfort (tightness feeling) on/off a/w bilateral breast discomfort "pinching pain", notes with onset feels sensation of shortness of breath- all symptoms self resolved in seconds.  No inciting factors known.  Denies association with activity or mood.\par No change in chronic minimal cough associated with postnasal drip\par Denies fever, chills, dizziness, palpitations or sweats.  Denies breast mass, rash or nipple discharge\par Feels symptoms are not like anything has felt in the past\par \par Reports exercising daily, doing [employer] and home aerobics x 40 mins- done w/o sx's or limitations\par \par Reports nl appetite and BMs.  \par Denies GI or  complaints.\par \par hx MCTD, necrotizing myopathy (hx muscle bx), hx imbalance (improved)/ /HAs (resolved)- stable\par -followed by rheum, seen 4/21 cont'd on med regimen with adherence encouraged. Labs done. Has f/u 8/21\par -reports hx improved imbalance/HAs (hx negative CTH 3/20)- notes HAs resolved since 5/20.  States saw neuro 3/21, advised to continue gabapentin and physical therapy and follow-up in 6 months\par -doing PT, feels it helps\par \par hx grief, depression/anxiety -  some increase in anxiety past 1 mo due to stress 2/2 planning daughter getting - communicating about it now.  Daughter also pregnant- happy about this.  Denies panic attacks/HI or SI\par -on Klonopin prn with help, no off - requests refill\par -Therapy pending, has resources\par -hx onset mainly 2/2 mom passing away in 4/20 (was at a NH and endstage COPD)-feels this is better controlled\par \par HTN, hx CP (resolved)-\par -on med regimen, adherent\par -seen by cardio, Dr. Rivero 10/20 and is s/p echo 11/20 (unchanged) and NST 11/20 (negative)\par -hx optho eval 1/20, thinks had nl dilated exam then.  Has follow-up 8/21\par \par Reports history of GYN eval 6/21 for vaginal discomfort.  States diagnosed with BV and trichomonas -has since been treated.  Has discussed results with  who has also been treated, denies known infidelity.  Asymptomatic since treatment\par

## 2021-07-27 NOTE — ASSESSMENT
[FreeTextEntry1] : \par 57 yo F pmhx HTN, HLD, preDM, hx ARAGON, MCTD, necrotizing myopathy, vit d insuff, B12 def for f/u\par \par \par \par hx stress 2/2 mother's death in 4/20, depression/anxiety a/w grief- mild, denies acute sx's\par -declines standing mood medication (ie SSRI)\par -on clonazepam with help- refilled , istop [id number] Risks/benefits/SEs discussed. Advised to avoid concurrent use with Ambien (given by rheum, now off), amitriptyline and ETOH given potential additive effects. Advised of need for office f/u for further refills.\par - referral and info for [facility] given prior- pending\par -info for behavioral health crisis walk-in center given prior (confirms has info)\par -advised to f/u if sx's worsen\par \par HTN, atypical CP, hx abnl EKG- c/o new atypical chest pain associated with breast discomfort; asymptomatic currently and vital signs stable.  Breast exam unremarkable.\par -EKG today: Sinus bradycardia at 50 bpm, nonspecific T changes (no change compared to 10/20)\par -7/18 echo: EF 69%, mild MR, borderline pulm HTN, nl LV fxn and size, mild diastolic dysfxn. \par -8/19 echo: EF 65%, nl LV fxn, mild pulm HTN, min MR, mild-mod TR. No PFO.\par -11/20 echo: EF 65-70%, no significant change c/w 7/18\par -11/20 exercise stress test: normal study\par -1/21 cxr: clear lungs\par -on amlodipine- benazepril and HCTZ 12.5 mg qday\par -seen by cardio, Dr. Rivero 10/20 and is s/p echo 11/20 (unchanged) and NST 11/20 (negative). F/U advsed\par -followed by optho, hx eval 1/20 with nl dilated exam per pt - yearly advised\par -9/20 UA negative, urine prt/crt wnl\par -1/21cmp wnl, cbc wnl, except plt clumped\par -1/21 plt check in blue top tube- 206 wnl\par -4/21 cbc wnl, plt clumped; cmp wnl\par -low salt diet advised\par -advised prompt medical eval if sx's recur\par -check dx'ic mammo/US\par -check cbc/bmp/TSH\par \par skin rash-suspect candidal infection\par -We will treat with clotrimazole topical\par -Advised to keep area dry\par \par hx MCTD, necrotizing myopathy (hx muscle bx), hx imbalance (improved)/ /HAs (resolved)- \par -followed by rheum, seen 1/21 noted ongoing joint pains in hands reported, cont'd on med regimen with adherence encouraged. Labs done. Has f/u 4/21\par -hx serum sickness with IVIg, avoiding during covid pandemic as high risk with use per pt\par -on CellCept (restarted fall 2019)\par -on Rituxan since 4/19; Neurontin, etodolac since 9/20 (when diclofenac d/c'd by rheum)\par -doing PT \par -followed by neuro, seen 3/21, advised to continue gabapentin and physical therapy and follow-up in 6 months\par -hx nl optho eval 1/20 per pt. has follow-up 8/21\par \par hx ARAGON- resolved per pt\par -seen by pulm 7/18, states called and told was "age appropriate" and advised increased exercise only. Office f/u pending.\par -seen by cardio, Dr. Rivero 10/20 and is s/p echo 11/20 (unchanged) and NST 11/20 (negative)\par \par preDM- 1/21 A1c 5.7 (was 5.8) \par -ADA diet, exercise encouraged\par -check A1c\par \par HLD- 1/21 Tchol 234 TG 78  HDL 79; ASCV risk < 7.5\par -low fat diet, exercise encouraged\par -check lipids\par \par GERD, IBS/constipation, hx hemorrhoids-\par -followed by GI\par -hx c-scope by Dr. Hammond in 4/18- told nl, advised repeat in 5 yrs.\par -hx omeprazole prn\par -on Metamucil prn\par -on prep H prn\par -asked to forward records\par \par hx sinus pressure, cough, postnasal drip- s/p UC eval 12/22/20 with negative covid pcr. Resolved.\par -s/p Augmentin course (never used diflucan given if had yeast infection concurrent with use)\par -1/21 cxr: clear lungs\par -on Flonase prn and Zyrtec prn\par -advised to cont social distancing and precautions for covid prevention \par -hx Moderna vaccine- 1/28, 2/25/21\par \par hx vit d def-\par -on OTC supp\par -1/21 vit d wnl\par \par hx B12 def-\par -1/21 B12/folate wnl\par -on oral supplement\par \par hx skin rash, itching- dx'd eczematous dermatitis by derm 3/19 with amitriptyline dose increased. Resolved.\par -followed by derm\par -on amitriptyline\par -hx FBSE 9/18, yearly advised. Regular use of sun block for skin cancer prevention advised.\par \par \par \par HCM\par -hx CPE 10/20, yearly advised\par -hx negative hep C screening 4/20\par -hx flu shot 9/20\par -hx Tdap 8/18\par -advised to check on shingrix insurance coverage and get okay to get from rheum prior to considering\par -hx negative PAP 8/19 by GYN per pt, has f/u 8/21\par -hx negative mammo 8/20\par -hx nl DEXA 12/19\par -hx screening colonoscopy 4/18, rec: repeat in 5 yrs\par \par Labs drawn in office today.\par

## 2021-07-27 NOTE — PHYSICAL EXAM
[Well-Appearing] : well-appearing [Normal Sclera/Conjunctiva] : normal sclera/conjunctiva [PERRL] : pupils equal round and reactive to light [EOMI] : extraocular movements intact [Normal Outer Ear/Nose] : the outer ears and nose were normal in appearance [Normal Oropharynx] : the oropharynx was normal [Normal Nasal Mucosa] : the nasal mucosa was normal [No Lymphadenopathy] : no lymphadenopathy [Supple] : supple [Thyroid Normal, No Nodules] : the thyroid was normal and there were no nodules present [No Respiratory Distress] : no respiratory distress  [Clear to Auscultation] : lungs were clear to auscultation bilaterally [Normal Rate] : normal rate  [Regular Rhythm] : with a regular rhythm [Normal S1, S2] : normal S1 and S2 [No Murmur] : no murmur heard [Pedal Pulses Present] : the pedal pulses are present [No Edema] : there was no peripheral edema [Soft] : abdomen soft [Non Tender] : non-tender [No HSM] : no HSM [Normal Supraclavicular Nodes] : no supraclavicular lymphadenopathy [Normal Posterior Cervical Nodes] : no posterior cervical lymphadenopathy [Normal Anterior Cervical Nodes] : no anterior cervical lymphadenopathy [No CVA Tenderness] : no CVA  tenderness [No Spinal Tenderness] : no spinal tenderness [No Joint Swelling] : no joint swelling [Grossly Normal Strength/Tone] : grossly normal strength/tone [No Focal Deficits] : no focal deficits [Normal Gait] : normal gait [Normal Affect] : the affect was normal [Alert and Oriented x3] : oriented to person, place, and time [No Acute Distress] : no acute distress [Normal Appearance] : normal in appearance [No Masses] : no palpable masses [No Nipple Discharge] : no nipple discharge [No Axillary Lymphadenopathy] : no axillary lymphadenopathy [de-identified] : No chest tenderness [de-identified] : Faint area of erythema with scaling in between breasts, no skin breakdown or discharge [de-identified] : tearful at times

## 2021-07-27 NOTE — REVIEW OF SYSTEMS
[Negative] : Genitourinary [FreeTextEntry5] : See HPI [FreeTextEntry9] : see HPI [de-identified] : see HPI [de-identified] : see HPI [de-identified] : see HPI

## 2021-07-28 LAB
ANION GAP SERPL CALC-SCNC: 10 MMOL/L
BASOPHILS # BLD AUTO: 0.04 K/UL
BASOPHILS NFR BLD AUTO: 0.7 %
BUN SERPL-MCNC: 13 MG/DL
CALCIUM SERPL-MCNC: 9.7 MG/DL
CHLORIDE SERPL-SCNC: 104 MMOL/L
CHOLEST SERPL-MCNC: 222 MG/DL
CO2 SERPL-SCNC: 27 MMOL/L
CREAT SERPL-MCNC: 0.68 MG/DL
EOSINOPHIL # BLD AUTO: 0.05 K/UL
EOSINOPHIL NFR BLD AUTO: 0.9 %
GLUCOSE SERPL-MCNC: 93 MG/DL
HCT VFR BLD CALC: 42.2 %
HDLC SERPL-MCNC: 85 MG/DL
HGB BLD-MCNC: 13.3 G/DL
IMM GRANULOCYTES NFR BLD AUTO: 0.2 %
LDLC SERPL CALC-MCNC: 126 MG/DL
LYMPHOCYTES # BLD AUTO: 1.75 K/UL
LYMPHOCYTES NFR BLD AUTO: 30.5 %
MAN DIFF?: NORMAL
MCHC RBC-ENTMCNC: 28 PG
MCHC RBC-ENTMCNC: 31.5 GM/DL
MCV RBC AUTO: 88.8 FL
MONOCYTES # BLD AUTO: 0.38 K/UL
MONOCYTES NFR BLD AUTO: 6.6 %
NEUTROPHILS # BLD AUTO: 3.51 K/UL
NEUTROPHILS NFR BLD AUTO: 61.1 %
NONHDLC SERPL-MCNC: 137 MG/DL
PLATELET # BLD AUTO: NORMAL K/UL
PLATELET # PLAS AUTO: 272 K/UL
POTASSIUM SERPL-SCNC: 3.9 MMOL/L
RBC # BLD: 4.75 M/UL
RBC # FLD: 15.2 %
SODIUM SERPL-SCNC: 142 MMOL/L
TRIGL SERPL-MCNC: 55 MG/DL
TSH SERPL-ACNC: 2.21 UIU/ML
WBC # FLD AUTO: 5.74 K/UL

## 2021-08-02 ENCOUNTER — TRANSCRIPTION ENCOUNTER (OUTPATIENT)
Age: 56
End: 2021-08-02

## 2021-08-09 ENCOUNTER — TRANSCRIPTION ENCOUNTER (OUTPATIENT)
Age: 56
End: 2021-08-09

## 2021-08-11 ENCOUNTER — NON-APPOINTMENT (OUTPATIENT)
Age: 56
End: 2021-08-11

## 2021-08-17 ENCOUNTER — APPOINTMENT (OUTPATIENT)
Dept: OBGYN | Facility: CLINIC | Age: 56
End: 2021-08-17
Payer: COMMERCIAL

## 2021-08-17 VITALS
SYSTOLIC BLOOD PRESSURE: 146 MMHG | TEMPERATURE: 97.2 F | WEIGHT: 147 LBS | DIASTOLIC BLOOD PRESSURE: 83 MMHG | HEART RATE: 66 BPM | BODY MASS INDEX: 23.63 KG/M2 | HEIGHT: 66 IN

## 2021-08-17 PROCEDURE — 99386 PREV VISIT NEW AGE 40-64: CPT

## 2021-08-27 ENCOUNTER — APPOINTMENT (OUTPATIENT)
Dept: RHEUMATOLOGY | Facility: CLINIC | Age: 56
End: 2021-08-27
Payer: COMMERCIAL

## 2021-08-27 VITALS
BODY MASS INDEX: 23.14 KG/M2 | DIASTOLIC BLOOD PRESSURE: 89 MMHG | OXYGEN SATURATION: 96 % | SYSTOLIC BLOOD PRESSURE: 143 MMHG | RESPIRATION RATE: 16 BRPM | TEMPERATURE: 98 F | HEART RATE: 60 BPM | WEIGHT: 144 LBS | HEIGHT: 66 IN

## 2021-08-27 DIAGNOSIS — M35.89 OTHER SPECIFIED SYSTEMIC INVOLVEMENT OF CONNECTIVE TISSUE: ICD-10-CM

## 2021-08-27 PROCEDURE — 99215 OFFICE O/P EST HI 40 MIN: CPT

## 2021-08-27 RX ORDER — ETODOLAC 500 MG/1
500 TABLET, FILM COATED, EXTENDED RELEASE ORAL
Qty: 30 | Refills: 2 | Status: DISCONTINUED | COMMUNITY
Start: 2020-09-04 | End: 2021-08-27

## 2021-08-28 ENCOUNTER — APPOINTMENT (OUTPATIENT)
Dept: RADIOLOGY | Facility: CLINIC | Age: 56
End: 2021-08-28
Payer: COMMERCIAL

## 2021-08-28 ENCOUNTER — OUTPATIENT (OUTPATIENT)
Dept: OUTPATIENT SERVICES | Facility: HOSPITAL | Age: 56
LOS: 1 days | End: 2021-08-28
Payer: COMMERCIAL

## 2021-08-28 DIAGNOSIS — Z00.8 ENCOUNTER FOR OTHER GENERAL EXAMINATION: ICD-10-CM

## 2021-08-28 DIAGNOSIS — M25.512 PAIN IN LEFT SHOULDER: ICD-10-CM

## 2021-08-28 DIAGNOSIS — Z98.890 OTHER SPECIFIED POSTPROCEDURAL STATES: Chronic | ICD-10-CM

## 2021-08-28 DIAGNOSIS — Z98.89 OTHER SPECIFIED POSTPROCEDURAL STATES: Chronic | ICD-10-CM

## 2021-08-28 DIAGNOSIS — Z98.51 TUBAL LIGATION STATUS: Chronic | ICD-10-CM

## 2021-08-28 PROCEDURE — 73030 X-RAY EXAM OF SHOULDER: CPT | Mod: 26,50

## 2021-08-28 PROCEDURE — 73030 X-RAY EXAM OF SHOULDER: CPT

## 2021-08-30 ENCOUNTER — TRANSCRIPTION ENCOUNTER (OUTPATIENT)
Age: 56
End: 2021-08-30

## 2021-09-01 ENCOUNTER — TRANSCRIPTION ENCOUNTER (OUTPATIENT)
Age: 56
End: 2021-09-01

## 2021-09-03 ENCOUNTER — TRANSCRIPTION ENCOUNTER (OUTPATIENT)
Age: 56
End: 2021-09-03

## 2021-09-03 LAB
25(OH)D3 SERPL-MCNC: 43.2 NG/ML
ALBUMIN SERPL ELPH-MCNC: 4.7 G/DL
ALP BLD-CCNC: 67 U/L
ALT SERPL-CCNC: 13 U/L
ANION GAP SERPL CALC-SCNC: 13 MMOL/L
APPEARANCE: CLEAR
AST SERPL-CCNC: 17 U/L
BACTERIA: NEGATIVE
BASOPHILS # BLD AUTO: 0.03 K/UL
BASOPHILS NFR BLD AUTO: 0.5 %
BILIRUB SERPL-MCNC: 0.7 MG/DL
BILIRUBIN URINE: NEGATIVE
BLOOD URINE: NEGATIVE
BUN SERPL-MCNC: 9 MG/DL
C3 SERPL-MCNC: 114 MG/DL
C4 SERPL-MCNC: 51 MG/DL
CALCIUM SERPL-MCNC: 9.7 MG/DL
CHLORIDE SERPL-SCNC: 104 MMOL/L
CK SERPL-CCNC: 122 U/L
CO2 SERPL-SCNC: 26 MMOL/L
COLOR: YELLOW
CONFIRM: 27.7 SEC
COVID-19 SPIKE DOMAIN ANTIBODY INTERPRETATION: POSITIVE
CREAT SERPL-MCNC: 0.73 MG/DL
CREAT SPEC-SCNC: 260 MG/DL
CREAT/PROT UR: 0.1 RATIO
CRP SERPL-MCNC: <3 MG/L
DRVVT IMM 1:2 NP PPP: NORMAL
DRVVT SCREEN TO CONFIRM RATIO: 0.94 RATIO
DSDNA AB SER-ACNC: <12 IU/ML
ENA RNP AB SER IA-ACNC: 1.9 AL
ENA SM AB SER IA-ACNC: <0.2 AL
ENA SS-A AB SER IA-ACNC: 1 AL
ENA SS-B AB SER IA-ACNC: <0.2 AL
EOSINOPHIL # BLD AUTO: 0.05 K/UL
EOSINOPHIL NFR BLD AUTO: 0.9 %
ERYTHROCYTE [SEDIMENTATION RATE] IN BLOOD BY WESTERGREN METHOD: 29 MM/HR
GLUCOSE QUALITATIVE U: NEGATIVE
GLUCOSE SERPL-MCNC: 94 MG/DL
HCT VFR BLD CALC: 44.5 %
HGB BLD-MCNC: 13.8 G/DL
HYALINE CASTS: 2 /LPF
IMM GRANULOCYTES NFR BLD AUTO: 0.2 %
KETONES URINE: NEGATIVE
LEUKOCYTE ESTERASE URINE: ABNORMAL
LYMPHOCYTES # BLD AUTO: 2.04 K/UL
LYMPHOCYTES NFR BLD AUTO: 36.4 %
MAN DIFF?: NORMAL
MCHC RBC-ENTMCNC: 27.4 PG
MCHC RBC-ENTMCNC: 31 GM/DL
MCV RBC AUTO: 88.3 FL
MICROSCOPIC-UA: NORMAL
MONOCYTES # BLD AUTO: 0.45 K/UL
MONOCYTES NFR BLD AUTO: 8 %
NEUTROPHILS # BLD AUTO: 3.02 K/UL
NEUTROPHILS NFR BLD AUTO: 54 %
NITRITE URINE: NEGATIVE
PH URINE: 5.5
PLATELET # BLD AUTO: NORMAL K/UL
POTASSIUM SERPL-SCNC: 4.4 MMOL/L
PROT SERPL-MCNC: 7.2 G/DL
PROT UR-MCNC: 17 MG/DL
PROTEIN URINE: NORMAL
RBC # BLD: 5.04 M/UL
RBC # FLD: 15 %
RED BLOOD CELLS URINE: 2 /HPF
SARS-COV-2 AB SERPL IA-ACNC: >250 U/ML
SCREEN DRVVT: 34.9 SEC
SODIUM SERPL-SCNC: 143 MMOL/L
SPECIFIC GRAVITY URINE: 1.03
SQUAMOUS EPITHELIAL CELLS: 4 /HPF
UROBILINOGEN URINE: NORMAL
WBC # FLD AUTO: 5.6 K/UL
WHITE BLOOD CELLS URINE: 11 /HPF

## 2021-09-04 ENCOUNTER — TRANSCRIPTION ENCOUNTER (OUTPATIENT)
Age: 56
End: 2021-09-04

## 2021-09-07 LAB
CYTOLOGY CVX/VAG DOC THIN PREP: ABNORMAL
HPV HIGH+LOW RISK DNA PNL CVX: DETECTED

## 2021-09-14 ENCOUNTER — TRANSCRIPTION ENCOUNTER (OUTPATIENT)
Age: 56
End: 2021-09-14

## 2021-09-25 ENCOUNTER — OUTPATIENT (OUTPATIENT)
Dept: OUTPATIENT SERVICES | Facility: HOSPITAL | Age: 56
LOS: 1 days | End: 2021-09-25
Payer: COMMERCIAL

## 2021-09-25 ENCOUNTER — APPOINTMENT (OUTPATIENT)
Dept: MRI IMAGING | Facility: CLINIC | Age: 56
End: 2021-09-25
Payer: COMMERCIAL

## 2021-09-25 DIAGNOSIS — Z98.890 OTHER SPECIFIED POSTPROCEDURAL STATES: Chronic | ICD-10-CM

## 2021-09-25 DIAGNOSIS — Z98.89 OTHER SPECIFIED POSTPROCEDURAL STATES: Chronic | ICD-10-CM

## 2021-09-25 DIAGNOSIS — M25.812 OTHER SPECIFIED JOINT DISORDERS, LEFT SHOULDER: ICD-10-CM

## 2021-09-25 DIAGNOSIS — Z98.51 TUBAL LIGATION STATUS: Chronic | ICD-10-CM

## 2021-09-25 PROCEDURE — 73221 MRI JOINT UPR EXTREM W/O DYE: CPT

## 2021-09-25 PROCEDURE — 73221 MRI JOINT UPR EXTREM W/O DYE: CPT | Mod: 26,LT

## 2021-09-28 ENCOUNTER — APPOINTMENT (OUTPATIENT)
Dept: INTERNAL MEDICINE | Facility: CLINIC | Age: 56
End: 2021-09-28
Payer: COMMERCIAL

## 2021-09-28 ENCOUNTER — NON-APPOINTMENT (OUTPATIENT)
Age: 56
End: 2021-09-28

## 2021-09-28 DIAGNOSIS — Z87.42 PERSONAL HISTORY OF OTHER DISEASES OF THE FEMALE GENITAL TRACT: ICD-10-CM

## 2021-09-28 DIAGNOSIS — Z87.09 PERSONAL HISTORY OF OTHER DISEASES OF THE RESPIRATORY SYSTEM: ICD-10-CM

## 2021-09-28 DIAGNOSIS — Z20.822 CONTACT WITH AND (SUSPECTED) EXPOSURE TO COVID-19: ICD-10-CM

## 2021-09-28 DIAGNOSIS — M25.512 PAIN IN LEFT SHOULDER: ICD-10-CM

## 2021-09-28 DIAGNOSIS — R21 RASH AND OTHER NONSPECIFIC SKIN ERUPTION: ICD-10-CM

## 2021-09-28 PROCEDURE — 99442: CPT

## 2021-09-28 NOTE — HISTORY OF PRESENT ILLNESS
[Home] : at home, [unfilled] , at the time of the visit. [Medical Office: ([facility])___] : at the medical office located in  [Verbal consent obtained from patient] : the patient, [unfilled] [FreeTextEntry1] : \par f/u [de-identified] : \par As this is a telemedicine visit, no physical exam could be performed.  Diagnosis and treatment is based on symptoms provided.\par No video portion done due to technical trouble patient was having.\par \par 55 yo F pmhx HTN, HLD, preDM, hx ARAGON, MCTD, necrotizing myopathy, vit d insuff, B12 def for f/u\par \par Last seen in office 7/27/21 for f/u with labs done.\par \par cc: med refill needed\par \par Feeling well.\par \par Reports is socially distancing and using precautions for covid prevention.\par Denies sick or covid positive contacts.\par -hx Moderna vaccine- 1/28, 2/25/21, 8/27/21\par \par hx grief, depression/anxiety -  reports controlled sx's since last visit.  Denies panic attacks/HI or SI.  Planning trip to [place] x 2 weeks end of October to be with daughter who will be having a baby in October - looking forward to this.\par -on Klonopin prn with help, on/off (taking qhs mainly)- requests refill\par -Therapy pending, has resources\par -hx onset 2/2 mom passing away in 4/20 (was at a NH and endstage COPD)-feels this is better controlled\par \par HTN, hx chest discomfort (tightness feeling) - reports has resolved x 3 weeks with changing bra as per gyn.  \par -cardio eval pending\par -8/21 breast imaging negative\par -hx CP: on/off a/w bilateral breast discomfort "pinching pain", notes with onset feels sensation of shortness of breath- all symptoms self resolve in seconds.  No inciting factors known.  Denies association with activity or mood.  Denies fever, chills, dizziness, palpitations or sweats.  Denies breast mass, rash or nipple discharge\par -on med regimen, adherent\par -seen by cardio, Dr. Rivero 10/20 and is s/p echo 11/20 (unchanged) and NST 11/20 (negative).  F/U pending\par -hx optho eval 1/20, thinks had nl dilated exam then.  F/u pending\par \par Reports exercising daily, doing Giovana and home aerobics x 40 mins- done w/o sx's or limitations\par \par Reports nl appetite and BMs.  \par Denies GI complaints.\par \par hx MCTD, necrotizing myopathy (hx muscle bx), hx imbalance (improved)/ /HAs (resolved)- stable\par -followed by rheum, seen 8/21 with labs done, prednisone started for c/o shoulder pain, had MRI recently - results pending. Has f/u 10/21\par -reports hx improved imbalance/HAs (hx negative CTH 3/20)- notes HAs resolved since 5/20.  States saw neuro 3/21, advised to continue gabapentin and physical therapy and follow-up in 6 months\par -doing PT\par \par hx GYN eval 8/21 with PAP/HPV- found abnl, awaiting colposcopy, has f/u 10/21\par -denies  complaints\par \par hx skin rash- reports resolved with clotrimazole use, no longer using

## 2021-09-28 NOTE — ASSESSMENT
[FreeTextEntry1] : \par 57 yo F pmhx HTN, HLD, preDM, hx ARAGON, MCTD, necrotizing myopathy, vit d insuff, B12 def for f/u\par \par \par hx stress 2/2 mother's death in 4/20, depression/anxiety a/w grief- mild, denies acute sx's\par -declines standing mood medication (ie SSRI)\par -on clonazepam with help- refilled, istop checked.  Risks/benefits/SEs discussed. Advised to avoid concurrent use with Ambien (given by rheum, now off), amitriptyline and ETOH given potential additive effects. Advised of need for visit for further refills.\par - referral and info for [employer] by [employer] [facility] given prior- pending\par -info for behavioral health crisis walk-in center given prior (confirms has info)\par -advised to f/u if sx's worsen\par \par HTN, atypical CP, hx abnl EKG- hx new atypical chest pain associated with breast discomfort, resolved with new bra\par -on amlodipine- benazepril and HCTZ 12.5 mg qday\par -7/21 EKG: Sinus bradycardia at 50 bpm, nonspecific T changes (no change compared to 10/20)\par -7/18 echo: EF 69%, mild MR, borderline pulm HTN, nl LV fxn and size, mild diastolic dysfxn. \par -8/19 echo: EF 65%, nl LV fxn, mild pulm HTN, min MR, mild-mod TR. No PFO.\par -11/20 echo: EF 65-70%, no significant change c/w 7/18\par -11/20 exercise stress test: normal study\par -1/21 cxr: clear lungs\par -8/21 b/l dx'ic mammo/US: benign, rec: annual mammo\par -9/20 UA negative, urine prt/crt wnl\par -721 bmp/TSH wnl, plt (blue top) 272\par -8/21 cmp wnl, cbc wnl, except plt clumped\par -seen by cardio, Dr. Rivero 10/20 and is s/p echo 11/20 (unchanged) and NST 11/20 (negative). F/U pending.\par -followed by optho, hx eval 1/20 with nl dilated exam per pt - yearly advised\par -breast specialist referral given prior, eval pending\par -low salt diet advised\par -advised prompt medical eval if sx's recur\par \par hx MCTD, necrotizing myopathy (hx muscle bx), hx imbalance (improved)/ /HAs (resolved)- \par -followed by rheum, seen 8/21 with labs done, prednisone started for c/o shoulder pain, had MRI recently - results pending. Has f/u 10/21\par -hx serum sickness with IVIg, avoiding during covid pandemic as high risk with use per pt\par -on CellCept (restarted fall 2019)\par -on Rituxan since 4/19; Neurontin, etodolac since 9/20 (when diclofenac d/c'd by rheum)\par -doing PT \par -followed by neuro, seen 3/21, advised to continue gabapentin and physical therapy and follow-up in 6 months\par -hx nl optho eval 1/20 per pt. has follow-up 8/21\par \par hx ARAGON- resolved per pt\par -seen by pulm 7/18, states called and told was "age appropriate" and advised increased exercise only. Office f/u pending.\par -seen by cardio, Dr. Rivero 10/20 and is s/p echo 11/20 (unchanged) and NST 11/20 (negative)\par \par preDM- 1/21 A1c 5.7 (was 5.8) \par -ADA diet, exercise encouraged\par \par HLD- 7/21 Tchol 222 TG 55  HDL 85; ASCV risk < 7.5\par -low fat diet, exercise encouraged\par \par GERD, IBS/constipation, hx hemorrhoids-\par -followed by GI\par -hx c-scope by Dr. Hammond in 4/18- told nl, advised repeat in 5 yrs.\par -hx omeprazole prn\par -on Metamucil prn\par -on prep H prn\par -asked to forward records\par \par hx vit d def-\par -on OTC supp\par -8/21 vit d wnl\par \par hx B12 def-\par -1/21 B12/folate wnl\par -on oral supplement\par \par hx skin rash 7/21- suspected candidal infection- resolved\par -s/p clotrimazole topical use\par -Advised to keep area dry\par \par hx skin rash, itching- dx'd eczematous dermatitis by derm 3/19 with amitriptyline dose increased. Resolved.\par -followed by derm\par -on amitriptyline\par -hx FBSE 9/18, yearly advised. Regular use of sun block for skin cancer prevention advised.\par \par MISC:  Continued social distancing and measure for covid19 prevention encouraged.  \par -hx Moderna vaccine- 1/28, 2/25/21, 8/27/21\par \par \par HCM\par -hx CPE 10/20, yearly advised\par -hx negative hep C screening 4/20\par -hx flu shot 9/20, yearly advised- encouraged to get prior to planned travel in 10/21\par -hx Tdap 8/18\par -advised to check on shingrix insurance coverage and get okay to get from rheum prior to considering\par -hx abnl PAP 8/21 (LSIL), HPV + , has GYN f/u pending 10/21 re: colposcopy \par -hx negative mammo/US 8/21\par -hx nl DEXA 12/19\par -hx screening colonoscopy 4/18, rec: repeat in 5 yrs\par \par Pt has prior scheduled office appt for CPE 11/15/21.  Advised to f/u sooner as needed.\par \par

## 2021-09-29 ENCOUNTER — TRANSCRIPTION ENCOUNTER (OUTPATIENT)
Age: 56
End: 2021-09-29

## 2021-09-30 ENCOUNTER — TRANSCRIPTION ENCOUNTER (OUTPATIENT)
Age: 56
End: 2021-09-30

## 2021-10-06 ENCOUNTER — TRANSCRIPTION ENCOUNTER (OUTPATIENT)
Age: 56
End: 2021-10-06

## 2021-10-07 ENCOUNTER — APPOINTMENT (OUTPATIENT)
Dept: RHEUMATOLOGY | Facility: CLINIC | Age: 56
End: 2021-10-07

## 2021-10-19 ENCOUNTER — APPOINTMENT (OUTPATIENT)
Dept: RHEUMATOLOGY | Facility: CLINIC | Age: 56
End: 2021-10-19
Payer: COMMERCIAL

## 2021-10-19 ENCOUNTER — LABORATORY RESULT (OUTPATIENT)
Age: 56
End: 2021-10-19

## 2021-10-19 VITALS
OXYGEN SATURATION: 97 % | SYSTOLIC BLOOD PRESSURE: 135 MMHG | WEIGHT: 142 LBS | HEART RATE: 70 BPM | HEIGHT: 66 IN | TEMPERATURE: 97 F | DIASTOLIC BLOOD PRESSURE: 78 MMHG | BODY MASS INDEX: 22.82 KG/M2

## 2021-10-19 PROCEDURE — 90686 IIV4 VACC NO PRSV 0.5 ML IM: CPT | Mod: GC

## 2021-10-19 PROCEDURE — 99214 OFFICE O/P EST MOD 30 MIN: CPT | Mod: 25,GC

## 2021-10-19 PROCEDURE — G0008: CPT | Mod: GC

## 2021-10-20 ENCOUNTER — TRANSCRIPTION ENCOUNTER (OUTPATIENT)
Age: 56
End: 2021-10-20

## 2021-10-20 LAB
ALBUMIN SERPL ELPH-MCNC: 4.7 G/DL
ALP BLD-CCNC: 64 U/L
ALT SERPL-CCNC: 9 U/L
ANION GAP SERPL CALC-SCNC: 13 MMOL/L
AST SERPL-CCNC: 13 U/L
BASOPHILS # BLD AUTO: 0.03 K/UL
BASOPHILS NFR BLD AUTO: 0.4 %
BILIRUB SERPL-MCNC: 0.6 MG/DL
BUN SERPL-MCNC: 12 MG/DL
CALCIUM SERPL-MCNC: 10.1 MG/DL
CHLORIDE SERPL-SCNC: 103 MMOL/L
CK SERPL-CCNC: 107 U/L
CO2 SERPL-SCNC: 25 MMOL/L
CREAT SERPL-MCNC: 0.7 MG/DL
EOSINOPHIL # BLD AUTO: 0.03 K/UL
EOSINOPHIL NFR BLD AUTO: 0.4 %
HCT VFR BLD CALC: 45 %
HGB BLD-MCNC: 13.6 G/DL
IMM GRANULOCYTES NFR BLD AUTO: 0.1 %
LYMPHOCYTES # BLD AUTO: 1.93 K/UL
LYMPHOCYTES NFR BLD AUTO: 25.7 %
MAN DIFF?: NORMAL
MCHC RBC-ENTMCNC: 27 PG
MCHC RBC-ENTMCNC: 30.2 GM/DL
MCV RBC AUTO: 89.5 FL
MONOCYTES # BLD AUTO: 0.39 K/UL
MONOCYTES NFR BLD AUTO: 5.2 %
NEUTROPHILS # BLD AUTO: 5.12 K/UL
NEUTROPHILS NFR BLD AUTO: 68.2 %
PLATELET # BLD AUTO: NORMAL K/UL
POTASSIUM SERPL-SCNC: 4.5 MMOL/L
PROT SERPL-MCNC: 7.2 G/DL
RBC # BLD: 5.03 M/UL
RBC # FLD: 15.3 %
SODIUM SERPL-SCNC: 142 MMOL/L
WBC # FLD AUTO: 7.51 K/UL

## 2021-10-21 ENCOUNTER — TRANSCRIPTION ENCOUNTER (OUTPATIENT)
Age: 56
End: 2021-10-21

## 2021-10-21 ENCOUNTER — APPOINTMENT (OUTPATIENT)
Dept: OBGYN | Facility: CLINIC | Age: 56
End: 2021-10-21
Payer: COMMERCIAL

## 2021-10-21 VITALS
TEMPERATURE: 94.6 F | HEIGHT: 66 IN | HEART RATE: 59 BPM | DIASTOLIC BLOOD PRESSURE: 81 MMHG | BODY MASS INDEX: 23.2 KG/M2 | WEIGHT: 144.38 LBS | SYSTOLIC BLOOD PRESSURE: 135 MMHG

## 2021-10-21 PROCEDURE — 57454 BX/CURETT OF CERVIX W/SCOPE: CPT

## 2021-11-15 LAB — CORE LAB BIOPSY: NORMAL

## 2021-11-15 NOTE — PAST MEDICAL HISTORY
[Definite ___ (Date)] : the last menstrual period was [unfilled] [Total Preg ___] : G[unfilled] [Living ___] : Living: [unfilled] [AB Induced ___] : elective abortions: [unfilled]  [Postmenopausal] : postmenopausal

## 2021-11-15 NOTE — PROCEDURE
[Colposcopy] : Colposcopy  [Time out performed] : Pre-procedure time out performed.  Patient's name, date of birth and procedure confirmed. [Consent Obtained] : Consent obtained [Risks] : risks [Benefits] : benefits [Alternatives] : alternatives [Patient] : patient [Infection] : infection [Bleeding] : bleeding [Allergic Reaction] : allergic reaction [LGSIL] : LGSIL [No Premedication] : no premedication [Colposcopy Adequate] : colposcopy adequate [Pap Performed] : pap not performed [SCI Fully Visualized] : SCI not fully visualized [ECC Performed] : ECC performed [No Abnormalities] : no abnormalities [Lesion] : lesion seen [Biopsy] : biopsy taken [Hemostasis Obtained] : Hemostasis obtained [Tolerated Well] : the patient tolerated the procedure well [de-identified] : 2 [de-identified] : Acetowhite at 12 and 6 [de-identified] : 12 and 6 oclock

## 2021-11-16 ENCOUNTER — NON-APPOINTMENT (OUTPATIENT)
Age: 56
End: 2021-11-16

## 2021-11-16 ENCOUNTER — APPOINTMENT (OUTPATIENT)
Dept: INTERNAL MEDICINE | Facility: CLINIC | Age: 56
End: 2021-11-16
Payer: COMMERCIAL

## 2021-11-16 VITALS
HEIGHT: 66 IN | DIASTOLIC BLOOD PRESSURE: 80 MMHG | TEMPERATURE: 98.2 F | RESPIRATION RATE: 16 BRPM | WEIGHT: 145 LBS | HEART RATE: 66 BPM | BODY MASS INDEX: 23.3 KG/M2 | OXYGEN SATURATION: 98 % | SYSTOLIC BLOOD PRESSURE: 124 MMHG

## 2021-11-16 PROCEDURE — 96127 BRIEF EMOTIONAL/BEHAV ASSMT: CPT

## 2021-11-16 PROCEDURE — 93000 ELECTROCARDIOGRAM COMPLETE: CPT

## 2021-11-16 PROCEDURE — 99396 PREV VISIT EST AGE 40-64: CPT | Mod: 25

## 2021-11-16 NOTE — HEALTH RISK ASSESSMENT
[Patient reported mammogram was normal] : Patient reported mammogram was normal [Patient reported bone density results were normal] : Patient reported bone density results were normal [Patient reported colonoscopy was normal] : Patient reported colonoscopy was normal [HIV test declined] : HIV test declined [Feels Safe at Home] : Feels safe at home [Smoke Detector] : smoke detector [Carbon Monoxide Detector] : carbon monoxide detector [Guns at Home] : guns at home [Seat Belt] :  uses seat belt [Sunscreen] : uses sunscreen [0] : 2) Feeling down, depressed, or hopeless: Not at all (0) [PHQ-2 Negative - No further assessment needed] : PHQ-2 Negative - No further assessment needed [Patient reported PAP Smear was abnormal] : Patient reported PAP Smear was abnormal [[id number]] : 0 [MammogramDate] : 08/21 [PapSmearDate] : 08/21 [BoneDensityDate] : 12/19 [ColonoscopyDate] : 04/18 [ColonoscopyComments] : told nl and repeat in 5 yrs per pt [HepatitisCDate] : 04/20 [HepatitisCComments] : negative [de-identified] :  is retired - kept in locked safe

## 2021-11-16 NOTE — PHYSICAL EXAM
[No Acute Distress] : no acute distress [Well-Appearing] : well-appearing [Normal Sclera/Conjunctiva] : normal sclera/conjunctiva [PERRL] : pupils equal round and reactive to light [EOMI] : extraocular movements intact [Normal Outer Ear/Nose] : the outer ears and nose were normal in appearance [Normal Oropharynx] : the oropharynx was normal [Normal TMs] : both tympanic membranes were normal [Normal Nasal Mucosa] : the nasal mucosa was normal [No Lymphadenopathy] : no lymphadenopathy [Supple] : supple [Thyroid Normal, No Nodules] : the thyroid was normal and there were no nodules present [No Respiratory Distress] : no respiratory distress  [Clear to Auscultation] : lungs were clear to auscultation bilaterally [Normal Rate] : normal rate  [Regular Rhythm] : with a regular rhythm [Normal S1, S2] : normal S1 and S2 [No Murmur] : no murmur heard [Pedal Pulses Present] : the pedal pulses are present [No Edema] : there was no peripheral edema [Soft] : abdomen soft [Non Tender] : non-tender [No HSM] : no HSM [Normal Supraclavicular Nodes] : no supraclavicular lymphadenopathy [Normal Posterior Cervical Nodes] : no posterior cervical lymphadenopathy [Normal Anterior Cervical Nodes] : no anterior cervical lymphadenopathy [No CVA Tenderness] : no CVA  tenderness [No Spinal Tenderness] : no spinal tenderness [No Joint Swelling] : no joint swelling [Grossly Normal Strength/Tone] : grossly normal strength/tone [No Rash] : no rash [No Focal Deficits] : no focal deficits [Normal Gait] : normal gait [Normal Affect] : the affect was normal [Alert and Oriented x3] : oriented to person, place, and time [de-identified] : scattered freckles [de-identified] : tearful at times

## 2021-11-16 NOTE — REVIEW OF SYSTEMS
[Negative] : Integumentary [FreeTextEntry9] : see HPI [de-identified] : see HPI [de-identified] : see HPI

## 2021-11-16 NOTE — ASSESSMENT
[FreeTextEntry1] : \par 57 yo F pmhx HTN, HLD, preDM, hx ARAGON, MCTD, necrotizing myopathy, vit d insuff, B12 def for CPE\par \par \par hx stress 2/2 mother's death in 4/20, depression/anxiety a/w grief- reports improved, mood better; denies acute sx's\par -declines standing mood medication (ie SSRI)\par -on clonazepam with help- refilled, istop checked.  Risks/benefits/SEs discussed. Advised to avoid concurrent use with Ambien (given by rheum, now off), amitriptyline and ETOH given potential additive effects. Advised of need for visit for further refills.\par - referral and info for [facility] given prior- pending.  Declines visit with in office therapist.\par -info for behavioral health crisis walk-in center given prior (confirms has info)\par -advised to f/u if sx's worsen\par \par HTN, atypical CP, hx abnl EKG- hx new atypical chest pain associated with breast discomfort, resolved with new bra\par -on amlodipine- benazepril and HCTZ 12.5 mg qday\par -EKG:  B @ 50 bpm, nonspecific T changes (no change compared to 10/20)\par -7/18 echo: EF 69%, mild MR, borderline pulm HTN, nl LV fxn and size, mild diastolic dysfxn. \par -8/19 echo: EF 65%, nl LV fxn, mild pulm HTN, min MR, mild-mod TR. No PFO.\par -11/20 echo: EF 65-70%, no significant change c/w 7/18\par -11/20 exercise stress test: normal study\par -1/21 cxr: clear lungs\par -8/21 b/l dx'ic mammo/US: benign, rec: annual mammo\par -9/20 UA negative, urine prt/crt wnl\par -7/21 bmp/TSH wnl, plt (blue top) 272\par -10/21 cmp wnl, cbc wnl, except plt clumped\par -seen by cardio, Dr. Rivero 10/20 and is s/p echo 11/20 (unchanged) and NST 11/20 (negative). F/U pending.\par -followed by optho, hx eval 10/21 with nl dilated exam- yearly advised\par -breast specialist referral given prior, eval pending\par -low salt diet advised\par -advised prompt medical eval if sx's recur\par -check TSH, bmp, plt ct\par \par hx MCTD, necrotizing myopathy (hx muscle bx), hx imbalance (improved)/ /HAs (resolved)- \par -9/21 left shoulder MRI: Marrow edema within the distal clavicle which may be related to stress reaction or posttraumatic osteolysis in the setting of recent trauma.\par -followed by rheum, seen 10/21 with labs done, shoulder MRI reviewed and referred to ortho and PT. No med changes made. Has f/u 12/21.\par -hx serum sickness with IVIg, avoiding during covid pandemic as high risk with use per pt\par -on CellCept (restarted fall 2019)\par -on Rituxan since 4/19; Neurontin, etodolac since 9/20 (when diclofenac d/c'd by rheum)\par -doing PT \par -followed by neuro, seen 3/21, advised to continue gabapentin and physical therapy and follow-up in 6 months\par -hx nl optho eval 10/21 \par -10/21 cbc wnl, except plt clumped; cmp wnl\par -check plt in blue top tube\par \par hx ARAGON- resolved per pt\par -seen by pulm 7/18, states called and told was "age appropriate" and advised increased exercise only. Office f/u pending.\par -seen by cardio, Dr. Rivero 10/20 and is s/p echo 11/20 (unchanged) and NST 11/20 (negative)\par \par preDM- 1/21 A1c 5.7 (was 5.8) \par -ADA diet, exercise encouraged\par -check A1c\par \par HLD- 7/21 Tchol 222 TG 55  HDL 85; ASCV risk < 7.5\par -low fat diet, exercise encouraged\par -check lipids\par \par GERD, IBS/constipation, hx hemorrhoids-\par -followed by GI\par -hx c-scope by Dr. Hammond in 4/18- told nl, advised repeat in 5 yrs.\par -hx omeprazole prn\par -on Metamucil prn\par -on prep H prn\par -asked to forward records\par \par hx vit d def-\par -on OTC supp\par -check level\par \par hx B12 def-\par -1/21 B12/folate wnl - check repeat\par -on oral supplement\par \par hx skin rash, itching- dx'd eczematous dermatitis by derm 3/19 with amitriptyline dose increased. Resolved.\par -followed by derm\par -on amitriptyline\par -hx FBSE 9/18, yearly advised. Regular use of sun block for skin cancer prevention advised.\par \par MISC:  Continued social distancing and measure for covid19 prevention encouraged.  \par -hx Moderna vaccine- 1/28, 2/25/21, 8/27/21\par \par \par HCM\par -check screening labs; declines HIV/STD screening\par -hx negative hep C screening 4/20\par -hx flu shot 10/21\par -hx Tdap 8/18\par -advised to check on shingrix insurance coverage and get okay to get from rheum prior to considering\par -hx abnl PAP 8/21 (LSIL), HPV + , s/p colposcopy 10/21 path: +FLEX I, PAP in 6mo advised.  Has GYN f/u 8/22.\par -hx negative mammo/US 8/21\par -hx nl DEXA 12/19\par -hx screening colonoscopy 4/18, rec: repeat in 5 yrs\par -yearly dental screening advised\par \par Pt's cell: [phone]\par \par Labs drawn in office today.\par \par \par

## 2021-11-16 NOTE — HISTORY OF PRESENT ILLNESS
[Health Maintenance] : health maintenance [Spouse] : spouse [] :  [Working Full Time] : working full time [Occupation ___] : occupation: [unfilled] [Former Cigarette Smoker] : is a former cigarette smoker [Quit Cigarettes: ___] : quit smoking [unfilled] [Occasional Use] : occasional alcohol use [Never] : has never used illicit drugs [Fair] : fair [Reg. Dental Visits] : She has regular dental visits [Healthy Diet] : She consumes a diverse and healthy diet [Regular Exercise] : She exercises regularly [Postmenopausal] : the patient is postmenopausal [FreeTextEntry1] : \par CPE [Binge Drinking] : denies binge drinking [Patient Concern] : no personal concern about alcohol use [Family Concern] : no family concern about alcohol use [Vision Problems] : She denies vision problems [Hearing Loss] : She denies hearing loss [de-identified] : 10/20 [de-identified] : hx 1/2 ppd x 20 yrs [de-identified] : hx flu shot 10/21, hx Tdap 8/18, hx PVX 3/20, hx hep B series, no hx shingles vaccine [de-identified] : \par 57 yo F pmhx HTN, HLD, preDM, hx ARAGON, MCTD, necrotizing myopathy, vit d insuff, B12 def for CPE\par \par Feeling well.\par Needs med refill.\par Fasting for labs.\par \par Reports is socially distancing and using precautions for covid prevention.\par Denies sick or covid positive contacts.\par -hx Moderna vaccine- 1/28, 2/25/21, 8/27/21\par \par hx grief, depression/anxiety -  reports controlled sx's since last visit.  Denies panic attacks/HI or SI.  Recent trip to [place] after daughter had a baby in October - had a nice time.\par -on Klonopin prn with help, on/off (taking qhs mainly)\par -Therapy pending, has resources\par -hx onset 2/2 mom passing away in 4/20 (was at a NH and endstage COPD)-feels this is better controlled\par \par HTN, hx chest discomfort (tightness feeling) - reports has resolved with changing bra as per gyn.  \par -cardio eval pending\par -8/21 breast imaging negative\par -hx CP: on/off a/w bilateral breast discomfort "pinching pain", notes with onset feels sensation of shortness of breath- all symptoms self resolve in seconds.  No inciting factors known.  Denies association with activity or mood.  Denies fever, chills, dizziness, palpitations or sweats.  Denies breast mass, rash or nipple discharge\par -on med regimen, adherent\par -seen by cardio, Dr. Rivero 10/20 and is s/p echo 11/20 (unchanged) and NST 11/20 (negative).  F/U pending\par -hx optho eval 10/21, told had nl dilated exam and to f/u in 1 yr\par \par Reports exercising 3x/wk doing Giovana and home aerobics x 40 mins- done w/o sx's or limitations\par \par Reports nl appetite and BMs.  \par Denies GI complaints.\par \par hx MCTD, necrotizing myopathy (hx muscle bx), hx imbalance (improved)/ /HAs (resolved)- stable\par -followed by rheum, seen 10/21 with labs done, shoulder MRI reviewed and referred to ortho and PT.  No med changes made.  Has f/u 12/21.\par -reports hx improved imbalance/HAs (hx negative CTH 3/20)- notes HAs resolved since 5/20.  States saw neuro 3/21, advised to continue gabapentin and physical therapy and follow-up in 6 months\par -doing PT\par \par hx GYN eval 8/21 with PAP/HPV- found abnl, s/p colposcopy 10/21 with abnl bx, planned to repeat PAP in 1 yr.  has GYN f/u 8/22.\par -denies  complaints\par

## 2021-11-18 ENCOUNTER — TRANSCRIPTION ENCOUNTER (OUTPATIENT)
Age: 56
End: 2021-11-18

## 2021-11-19 LAB
25(OH)D3 SERPL-MCNC: 40.8 NG/ML
ANION GAP SERPL CALC-SCNC: 12 MMOL/L
BUN SERPL-MCNC: 11 MG/DL
CALCIUM SERPL-MCNC: 9.8 MG/DL
CHLORIDE SERPL-SCNC: 104 MMOL/L
CHOLEST SERPL-MCNC: 265 MG/DL
CO2 SERPL-SCNC: 27 MMOL/L
CREAT SERPL-MCNC: 0.71 MG/DL
ESTIMATED AVERAGE GLUCOSE: 123 MG/DL
FOLATE SERPL-MCNC: 9.3 NG/ML
GLUCOSE SERPL-MCNC: 88 MG/DL
HBA1C MFR BLD HPLC: 5.9 %
HDLC SERPL-MCNC: 104 MG/DL
LDLC SERPL CALC-MCNC: 146 MG/DL
NONHDLC SERPL-MCNC: 160 MG/DL
PLATELET # PLAS AUTO: 278 K/UL
POTASSIUM SERPL-SCNC: 3.7 MMOL/L
SODIUM SERPL-SCNC: 142 MMOL/L
TRIGL SERPL-MCNC: 71 MG/DL
TSH SERPL-ACNC: 1.54 UIU/ML
VIT B12 SERPL-MCNC: 510 PG/ML

## 2021-12-16 ENCOUNTER — TRANSCRIPTION ENCOUNTER (OUTPATIENT)
Age: 56
End: 2021-12-16

## 2021-12-21 ENCOUNTER — LABORATORY RESULT (OUTPATIENT)
Age: 56
End: 2021-12-21

## 2021-12-21 ENCOUNTER — APPOINTMENT (OUTPATIENT)
Dept: RHEUMATOLOGY | Facility: CLINIC | Age: 56
End: 2021-12-21
Payer: COMMERCIAL

## 2021-12-21 VITALS
HEIGHT: 66 IN | SYSTOLIC BLOOD PRESSURE: 136 MMHG | DIASTOLIC BLOOD PRESSURE: 85 MMHG | WEIGHT: 147 LBS | BODY MASS INDEX: 23.63 KG/M2 | TEMPERATURE: 97.5 F | OXYGEN SATURATION: 98 % | HEART RATE: 71 BPM

## 2021-12-21 PROCEDURE — 99214 OFFICE O/P EST MOD 30 MIN: CPT

## 2021-12-22 ENCOUNTER — TRANSCRIPTION ENCOUNTER (OUTPATIENT)
Age: 56
End: 2021-12-22

## 2021-12-22 LAB
ALBUMIN SERPL ELPH-MCNC: 4.7 G/DL
ALP BLD-CCNC: 63 U/L
ALT SERPL-CCNC: 9 U/L
ANION GAP SERPL CALC-SCNC: 14 MMOL/L
AST SERPL-CCNC: 15 U/L
BASOPHILS # BLD AUTO: 0.04 K/UL
BASOPHILS NFR BLD AUTO: 0.4 %
BILIRUB SERPL-MCNC: 0.5 MG/DL
BUN SERPL-MCNC: 13 MG/DL
CALCIUM SERPL-MCNC: 10 MG/DL
CHLORIDE SERPL-SCNC: 101 MMOL/L
CK SERPL-CCNC: 93 U/L
CO2 SERPL-SCNC: 27 MMOL/L
CREAT SERPL-MCNC: 0.85 MG/DL
CRP SERPL-MCNC: <3 MG/L
EOSINOPHIL # BLD AUTO: 0.02 K/UL
EOSINOPHIL NFR BLD AUTO: 0.2 %
ERYTHROCYTE [SEDIMENTATION RATE] IN BLOOD BY WESTERGREN METHOD: 25 MM/HR
GLUCOSE SERPL-MCNC: 76 MG/DL
HCT VFR BLD CALC: 45 %
HGB BLD-MCNC: 13.7 G/DL
IMM GRANULOCYTES NFR BLD AUTO: 0.4 %
LYMPHOCYTES # BLD AUTO: 1.74 K/UL
LYMPHOCYTES NFR BLD AUTO: 19.2 %
MAN DIFF?: NORMAL
MCHC RBC-ENTMCNC: 27.8 PG
MCHC RBC-ENTMCNC: 30.4 GM/DL
MCV RBC AUTO: 91.3 FL
MONOCYTES # BLD AUTO: 0.5 K/UL
MONOCYTES NFR BLD AUTO: 5.5 %
NEUTROPHILS # BLD AUTO: 6.7 K/UL
NEUTROPHILS NFR BLD AUTO: 74.3 %
PLATELET # BLD AUTO: NORMAL K/UL
POTASSIUM SERPL-SCNC: 3.8 MMOL/L
PROT SERPL-MCNC: 7.3 G/DL
RBC # BLD: 4.93 M/UL
RBC # FLD: 16.3 %
SODIUM SERPL-SCNC: 142 MMOL/L
WBC # FLD AUTO: 9.04 K/UL

## 2022-01-15 ENCOUNTER — TRANSCRIPTION ENCOUNTER (OUTPATIENT)
Age: 57
End: 2022-01-15

## 2022-01-31 ENCOUNTER — NON-APPOINTMENT (OUTPATIENT)
Age: 57
End: 2022-01-31

## 2022-02-01 ENCOUNTER — NON-APPOINTMENT (OUTPATIENT)
Age: 57
End: 2022-02-01

## 2022-02-03 ENCOUNTER — TRANSCRIPTION ENCOUNTER (OUTPATIENT)
Age: 57
End: 2022-02-03

## 2022-02-04 ENCOUNTER — RX RENEWAL (OUTPATIENT)
Age: 57
End: 2022-02-04

## 2022-02-10 ENCOUNTER — TRANSCRIPTION ENCOUNTER (OUTPATIENT)
Age: 57
End: 2022-02-10

## 2022-02-14 ENCOUNTER — TRANSCRIPTION ENCOUNTER (OUTPATIENT)
Age: 57
End: 2022-02-14

## 2022-02-15 ENCOUNTER — APPOINTMENT (OUTPATIENT)
Dept: INTERNAL MEDICINE | Facility: CLINIC | Age: 57
End: 2022-02-15
Payer: COMMERCIAL

## 2022-02-15 VITALS — DIASTOLIC BLOOD PRESSURE: 82 MMHG | SYSTOLIC BLOOD PRESSURE: 120 MMHG

## 2022-02-15 VITALS
OXYGEN SATURATION: 99 % | DIASTOLIC BLOOD PRESSURE: 86 MMHG | RESPIRATION RATE: 16 BRPM | WEIGHT: 146 LBS | SYSTOLIC BLOOD PRESSURE: 136 MMHG | TEMPERATURE: 97.8 F | BODY MASS INDEX: 23.46 KG/M2 | HEART RATE: 57 BPM | HEIGHT: 66 IN

## 2022-02-15 DIAGNOSIS — R26.89 OTHER ABNORMALITIES OF GAIT AND MOBILITY: ICD-10-CM

## 2022-02-15 PROCEDURE — 96127 BRIEF EMOTIONAL/BEHAV ASSMT: CPT

## 2022-02-15 PROCEDURE — 99213 OFFICE O/P EST LOW 20 MIN: CPT | Mod: 25

## 2022-02-15 RX ORDER — PREDNISONE 2.5 MG/1
2.5 TABLET ORAL DAILY
Qty: 14 | Refills: 0 | Status: DISCONTINUED | COMMUNITY
Start: 2021-08-27 | End: 2022-02-15

## 2022-02-15 NOTE — REVIEW OF SYSTEMS
[Negative] : Integumentary [FreeTextEntry5] : see HPI [FreeTextEntry9] : see HPI [de-identified] : see HPI [de-identified] : see HPI

## 2022-02-15 NOTE — HISTORY OF PRESENT ILLNESS
[FreeTextEntry1] : \par f/u [de-identified] : \par 55 yo F pmhx HTN, HLD, preDM, hx ARAGON, MCTD, necrotizing myopathy, vit d insuff, B12 def for f/u\par \par Last seen 11/16/21 for CPE with labs done.\par \par Feeling well.\par Needs med refill.\par Fasting for labs.\par \par Reports is socially distancing and using precautions for covid prevention.\par Denies sick or covid positive contacts.\par Denies fever, chills, cough or sob.\par -hx Moderna vaccine- 1/28, 2/25/21, 8/27/21\par \par \par States 2 weeks ago opened car door and hit left part of head/face, no LOC- has mild focal soreness since, overall stable, is 2/10 pain\par -denies change in regular occasional HAs\par -denies dizziness, vision trouble, falls or imbalance\par -no pain meds taken or heat/ice used\par \par hx grief, depression/anxiety -  reports stable sx's.  +infrequent panic attacks- resolve with deep breathing and prayer.  Denies active depression, HI or SI.  \par -on Klonopin prn with help, on/off (taking qhs mainly)\par -Therapy pending, now willing to meet with in office provider\par -hx onset 2/2 mom passing away in 4/20 (was at a NH and endstage COPD), general life issues and covid pandemic related concerns\par \par HTN, hx chest discomfort (tightness feeling) - reports helped some with changing bra as per gyn , but still gets on/off - overall stable sx's, mainly focal left chest tenderness\par -cardio eval pending\par -8/21 breast imaging negative\par -hx CP: on/off a/w bilateral breast discomfort "pinching pain", notes with onset feels sensation of shortness of breath- all symptoms self resolve in seconds.  No inciting factors known.  Denies association with activity or mood.  Denies fever, chills, dizziness, palpitations or sweats.  Denies breast mass, rash or nipple discharge\par -on med regimen, adherent\par -has low salt diet\par -seen by cardio, Dr. Rivero 10/20 and is s/p echo 11/20 (unchanged) and NST 11/20 (negative).  F/U pending\par -hx optho eval 10/21, told had nl dilated exam and to f/u in 1 yr\par -breast specialist eval pending\par \par Reports exercising  sessions 2x/wk x 40 mins- done w/o sx's or limitations\par \par Reports eating low fat/carb on/off\par Reports nl appetite and BMs.  \par Denies GI complaints.\par \par hx MCTD, necrotizing myopathy (hx muscle bx), hx imbalance (improved)/ /HAs (resolved)-  reports stable\par -followed by rheum, seen 112/21, no med changes made, planned to complete prednisone taper (off since 1/22).  Has f/u 3/22.\par -reports hx improved imbalance/HAs (hx negative CTH 3/20)- notes HAs resolved since 5/20.  States saw neuro 3/21, advised to continue gabapentin and physical therapy and follow-up in 6 months\par -doing PT BIW for left shoulder, helps\par -no OTC pain meds taken\par \par hx GYN eval 8/21 with PAP/HPV- found abnl, s/p colposcopy 10/21 with abnl bx, planned to repeat PAP in 1 yr.  Has GYN f/u 4/22.\par -denies  complaints\par

## 2022-02-15 NOTE — HEALTH RISK ASSESSMENT
[0] : 2) Feeling down, depressed, or hopeless: Not at all (0) [PHQ-2 Negative - No further assessment needed] : PHQ-2 Negative - No further assessment needed [[id number]] : 0

## 2022-02-15 NOTE — ASSESSMENT
[FreeTextEntry1] : \par 55 yo F pmhx HTN, HLD, preDM, hx ARAGON, MCTD, necrotizing myopathy, vit d insuff, B12 def for f/u\par \par \par hx stress 2/2 mother's death in 4/20, depression/anxiety a/w grief- reports improved, mood better; denies acute sx's\par -declines standing mood medication (ie SSRI)\par -on clonazepam with help- refilled, istop checked.  Risks/benefits/SEs discussed. Advised to avoid concurrent use with Ambien (given by rheum, now off), amitriptyline and ETOH given potential additive effects. Advised of need for visit for further refills.\par -BH referral given again- office to assist to schedule with in office therapist Umair Molina, pt agreeable\par -info for behavioral health crisis walk-in center given prior (confirms has info)\par -advised to f/u if sx's worsen\par \par HTN, atypical CP, hx abnl EKG- hx new atypical chest pain associated with breast discomfort, helped some with new bra\par -on amlodipine- benazepril and HCTZ 12.5 mg qday\par -7/18 echo: EF 69%, mild MR, borderline pulm HTN, nl LV fxn and size, mild diastolic dysfxn. \par -8/19 echo: EF 65%, nl LV fxn, mild pulm HTN, min MR, mild-mod TR. No PFO.\par -11/20 echo: EF 65-70%, no significant change c/w 7/18\par -11/20 exercise stress test: normal study\par -1/21 cxr: clear lungs\par -8/21 b/l dx'ic mammo/US: benign, rec: annual mammo\par -8/21 UA trc prt, urine prt/crt wnl\par -11/21 cmp/TSH wnl, plt (blue top) 278\par -12/21 cmp wnl, cbc wnl, except plt clumped\par -seen by cardio, Dr. Rivero 10/20 and is s/p echo 11/20 (unchanged) and NST 11/20 (negative). F/U pending.\par -followed by optho, hx eval 10/21 with nl dilated exam- yearly advised\par -breast specialist referral given prior, eval pending\par -low salt diet advised\par -advised prompt medical eval if sx's recur\par -check cbc, bmp, plt ct\par \par hx MCTD, necrotizing myopathy (hx muscle bx), hx imbalance (improved)/ /HAs (resolved)- \par -9/21 left shoulder MRI: Marrow edema within the distal clavicle which may be related to stress reaction or posttraumatic osteolysis in the setting of recent trauma.\par -followed by rheum, seen 112/21, no med changes made, planned to complete prednisone taper.  Has f/u 3/22.\par -hx serum sickness with IVIg, avoiding during covid pandemic as high risk with use per pt\par -on CellCept (restarted fall 2019)\par -on Rituxan since 4/19; Neurontin, etodolac since 9/20 (when diclofenac d/c'd by rheum)\par -doing PT for left shoulder\par -followed by neuro, seen 3/21, advised to continue gabapentin and physical therapy and follow-up in 6 months- pending, states in process of obtaining new provider - declines referral\par -hx nl optho eval 10/21 \par -10/21 cbc wnl, except plt clumped; cmp wnl\par -check plt in blue top tube\par \par hx ARAGON- resolved per pt\par -seen by pulm 7/18, states called and told was "age appropriate" and advised increased exercise only. Office f/u pending.\par -seen by cardio, Dr. Rivero 10/20 and is s/p echo 11/20 (unchanged) and NST 11/20 (negative)\par \par preDM- 11/21 A1c 5.9 (was 5.7) \par -ADA diet, exercise encouraged\par -check A1c\par \par HLD- 11/21 Tchol 265 TG 71  ; ASCV risk < 7.5\par -low fat diet, exercise encouraged\par -check lipids\par \par GERD, IBS/constipation, hx hemorrhoids-\par -followed by GI\par -hx c-scope by Dr. Hammond in 4/18- told nl, advised repeat in 5 yrs.\par -hx omeprazole prn\par -on Metamucil prn\par -on prep H prn\par -asked to forward records\par \par hx vit d def-\par -on OTC supp\par -11/21 level wnl\par \par hx B12 def-\par -11/21 B12/folate wnl \par -on oral supplement\par \par hx skin rash, itching- dx'd eczematous dermatitis by derm 3/19 with amitriptyline dose increased. Resolved.\par -followed by derm\par -on amitriptyline\par -hx FBSE 9/18, yearly advised. Regular use of sun block for skin cancer prevention advised.\par \par MISC:  Continued social distancing and measure for covid19 prevention encouraged.  \par -hx Moderna vaccine- 1/28, 2/25/21, 8/27/21\par \par \par HCM\par -hx CPE 11/21\par -hx negative hep C screening 4/20\par -hx flu shot 10/21\par -hx Tdap 8/18\par -advised to check on shingrix insurance coverage and get okay to get from rheum prior to considering\par -hx abnl PAP 8/21 (LSIL), HPV + , s/p colposcopy 10/21 path: +FLEX I, PAP in 6mo advised.  Has GYN f/u 4/22.\par -hx negative mammo/US 8/21\par -hx nl DEXA 12/19, repeat pending per rheum\par -hx screening colonoscopy 4/18, rec: repeat in 5 yrs\par \par Pt's cell: [phone]\par \par Labs drawn in office today.\par \par

## 2022-02-15 NOTE — PHYSICAL EXAM
[No Acute Distress] : no acute distress [Well-Appearing] : well-appearing [Normal Sclera/Conjunctiva] : normal sclera/conjunctiva [EOMI] : extraocular movements intact [PERRL] : pupils equal round and reactive to light [Normal Outer Ear/Nose] : the outer ears and nose were normal in appearance [Normal Oropharynx] : the oropharynx was normal [Normal Nasal Mucosa] : the nasal mucosa was normal [No Lymphadenopathy] : no lymphadenopathy [Supple] : supple [Thyroid Normal, No Nodules] : the thyroid was normal and there were no nodules present [No Respiratory Distress] : no respiratory distress  [Clear to Auscultation] : lungs were clear to auscultation bilaterally [Normal Rate] : normal rate  [Regular Rhythm] : with a regular rhythm [Normal S1, S2] : normal S1 and S2 [No Murmur] : no murmur heard [Pedal Pulses Present] : the pedal pulses are present [No Edema] : there was no peripheral edema [Soft] : abdomen soft [Non Tender] : non-tender [No HSM] : no HSM [Normal Supraclavicular Nodes] : no supraclavicular lymphadenopathy [Normal Posterior Cervical Nodes] : no posterior cervical lymphadenopathy [Normal Anterior Cervical Nodes] : no anterior cervical lymphadenopathy [No CVA Tenderness] : no CVA  tenderness [No Spinal Tenderness] : no spinal tenderness [No Joint Swelling] : no joint swelling [Grossly Normal Strength/Tone] : grossly normal strength/tone [No Rash] : no rash [No Focal Deficits] : no focal deficits [Normal Gait] : normal gait [Normal Affect] : the affect was normal [Alert and Oriented x3] : oriented to person, place, and time [de-identified] : no focal tenderness at left face/head, no rash or bruising [de-identified] : no photophobia [de-identified] : scattered freckles [de-identified] : tearful at times

## 2022-02-16 ENCOUNTER — TRANSCRIPTION ENCOUNTER (OUTPATIENT)
Age: 57
End: 2022-02-16

## 2022-02-16 LAB
ALBUMIN SERPL ELPH-MCNC: 4.5 G/DL
ALP BLD-CCNC: 59 U/L
ALT SERPL-CCNC: 11 U/L
ANION GAP SERPL CALC-SCNC: 15 MMOL/L
AST SERPL-CCNC: 13 U/L
BASOPHILS # BLD AUTO: 0.03 K/UL
BASOPHILS NFR BLD AUTO: 0.5 %
BILIRUB SERPL-MCNC: 0.6 MG/DL
BUN SERPL-MCNC: 12 MG/DL
CALCIUM SERPL-MCNC: 9.5 MG/DL
CHLORIDE SERPL-SCNC: 109 MMOL/L
CHOLEST SERPL-MCNC: 238 MG/DL
CO2 SERPL-SCNC: 22 MMOL/L
CREAT SERPL-MCNC: 0.69 MG/DL
EOSINOPHIL # BLD AUTO: 0.04 K/UL
EOSINOPHIL NFR BLD AUTO: 0.6 %
ESTIMATED AVERAGE GLUCOSE: 114 MG/DL
GLUCOSE SERPL-MCNC: 109 MG/DL
HBA1C MFR BLD HPLC: 5.6 %
HCT VFR BLD CALC: 39.4 %
HDLC SERPL-MCNC: 96 MG/DL
HGB BLD-MCNC: 12.1 G/DL
IMM GRANULOCYTES NFR BLD AUTO: 0.2 %
LDLC SERPL CALC-MCNC: 127 MG/DL
LYMPHOCYTES # BLD AUTO: 2.16 K/UL
LYMPHOCYTES NFR BLD AUTO: 32.7 %
MAN DIFF?: NORMAL
MCHC RBC-ENTMCNC: 27.4 PG
MCHC RBC-ENTMCNC: 30.7 GM/DL
MCV RBC AUTO: 89.1 FL
MONOCYTES # BLD AUTO: 0.4 K/UL
MONOCYTES NFR BLD AUTO: 6.1 %
NEUTROPHILS # BLD AUTO: 3.97 K/UL
NEUTROPHILS NFR BLD AUTO: 59.9 %
NONHDLC SERPL-MCNC: 142 MG/DL
PLATELET # BLD AUTO: NORMAL K/UL
PLATELET # PLAS AUTO: 266 K/UL
POTASSIUM SERPL-SCNC: 4.1 MMOL/L
PROT SERPL-MCNC: 6.5 G/DL
RBC # BLD: 4.42 M/UL
RBC # FLD: 15 %
SODIUM SERPL-SCNC: 146 MMOL/L
TRIGL SERPL-MCNC: 76 MG/DL
WBC # FLD AUTO: 6.61 K/UL

## 2022-02-24 ENCOUNTER — TRANSCRIPTION ENCOUNTER (OUTPATIENT)
Age: 57
End: 2022-02-24

## 2022-02-24 ENCOUNTER — NON-APPOINTMENT (OUTPATIENT)
Age: 57
End: 2022-02-24

## 2022-03-10 ENCOUNTER — APPOINTMENT (OUTPATIENT)
Dept: RHEUMATOLOGY | Facility: CLINIC | Age: 57
End: 2022-03-10

## 2022-03-17 ENCOUNTER — APPOINTMENT (OUTPATIENT)
Dept: CARDIOLOGY | Facility: CLINIC | Age: 57
End: 2022-03-17
Payer: COMMERCIAL

## 2022-03-17 ENCOUNTER — APPOINTMENT (OUTPATIENT)
Dept: SURGICAL ONCOLOGY | Facility: CLINIC | Age: 57
End: 2022-03-17
Payer: COMMERCIAL

## 2022-03-17 ENCOUNTER — NON-APPOINTMENT (OUTPATIENT)
Age: 57
End: 2022-03-17

## 2022-03-17 VITALS
SYSTOLIC BLOOD PRESSURE: 176 MMHG | HEIGHT: 66 IN | BODY MASS INDEX: 23.46 KG/M2 | WEIGHT: 146 LBS | TEMPERATURE: 97.6 F | DIASTOLIC BLOOD PRESSURE: 95 MMHG | OXYGEN SATURATION: 100 % | HEART RATE: 55 BPM | RESPIRATION RATE: 16 BRPM

## 2022-03-17 VITALS
SYSTOLIC BLOOD PRESSURE: 142 MMHG | TEMPERATURE: 97.6 F | HEART RATE: 73 BPM | WEIGHT: 146 LBS | BODY MASS INDEX: 23.46 KG/M2 | DIASTOLIC BLOOD PRESSURE: 86 MMHG | OXYGEN SATURATION: 100 % | HEIGHT: 66 IN

## 2022-03-17 PROCEDURE — 93000 ELECTROCARDIOGRAM COMPLETE: CPT

## 2022-03-17 PROCEDURE — 99214 OFFICE O/P EST MOD 30 MIN: CPT

## 2022-03-17 PROCEDURE — 99244 OFF/OP CNSLTJ NEW/EST MOD 40: CPT

## 2022-03-22 ENCOUNTER — APPOINTMENT (OUTPATIENT)
Dept: FAMILY MEDICINE | Facility: CLINIC | Age: 57
End: 2022-03-22

## 2022-03-22 ENCOUNTER — APPOINTMENT (OUTPATIENT)
Dept: RHEUMATOLOGY | Facility: CLINIC | Age: 57
End: 2022-03-22
Payer: COMMERCIAL

## 2022-03-22 ENCOUNTER — LABORATORY RESULT (OUTPATIENT)
Age: 57
End: 2022-03-22

## 2022-03-22 VITALS
BODY MASS INDEX: 23.3 KG/M2 | OXYGEN SATURATION: 99 % | HEART RATE: 69 BPM | HEIGHT: 66 IN | DIASTOLIC BLOOD PRESSURE: 100 MMHG | SYSTOLIC BLOOD PRESSURE: 151 MMHG | WEIGHT: 145 LBS | TEMPERATURE: 97.2 F

## 2022-03-22 LAB
ALBUMIN SERPL ELPH-MCNC: 4.5 G/DL
ALBUMIN SERPL ELPH-MCNC: 4.5 G/DL
ALP BLD-CCNC: 53 U/L
ALP BLD-CCNC: 53 U/L
ALT SERPL-CCNC: 10 U/L
ALT SERPL-CCNC: 10 U/L
AMYLASE/CREAT SERPL: 95 U/L
ANION GAP SERPL CALC-SCNC: 12 MMOL/L
ANION GAP SERPL CALC-SCNC: 12 MMOL/L
APPEARANCE: CLEAR
AST SERPL-CCNC: 14 U/L
AST SERPL-CCNC: 14 U/L
BACTERIA: NEGATIVE
BASOPHILS # BLD AUTO: 0.03 K/UL
BASOPHILS NFR BLD AUTO: 0.6 %
BILIRUB SERPL-MCNC: 0.8 MG/DL
BILIRUB SERPL-MCNC: 0.8 MG/DL
BILIRUBIN URINE: NEGATIVE
BLOOD URINE: NEGATIVE
BUN SERPL-MCNC: 11 MG/DL
BUN SERPL-MCNC: 11 MG/DL
C3 SERPL-MCNC: 108 MG/DL
C4 SERPL-MCNC: 48 MG/DL
CALCIUM SERPL-MCNC: 9.2 MG/DL
CALCIUM SERPL-MCNC: 9.2 MG/DL
CHLORIDE SERPL-SCNC: 105 MMOL/L
CHLORIDE SERPL-SCNC: 105 MMOL/L
CK SERPL-CCNC: 107 U/L
CO2 SERPL-SCNC: 24 MMOL/L
CO2 SERPL-SCNC: 24 MMOL/L
COLOR: COLORLESS
CREAT SERPL-MCNC: 0.65 MG/DL
CREAT SERPL-MCNC: 0.65 MG/DL
CREAT SPEC-SCNC: 35 MG/DL
CREAT/PROT UR: NORMAL RATIO
CRP SERPL-MCNC: <3 MG/L
EGFR: 101 ML/MIN/1.73M2
EGFR: 101 ML/MIN/1.73M2
EOSINOPHIL # BLD AUTO: 0.03 K/UL
EOSINOPHIL NFR BLD AUTO: 0.6 %
ERYTHROCYTE [SEDIMENTATION RATE] IN BLOOD BY WESTERGREN METHOD: 23 MM/HR
GLUCOSE QUALITATIVE U: NEGATIVE
GLUCOSE SERPL-MCNC: 88 MG/DL
HAV IGM SER QL: NONREACTIVE
HBV CORE IGG+IGM SER QL: NONREACTIVE
HBV CORE IGM SER QL: NONREACTIVE
HBV SURFACE AG SER QL: NONREACTIVE
HCT VFR BLD CALC: 41.6 %
HCV AB SER QL: NONREACTIVE
HCV S/CO RATIO: 0.15 S/CO
HGB BLD-MCNC: 13.2 G/DL
HYALINE CASTS: 0 /LPF
IMM GRANULOCYTES NFR BLD AUTO: 0.2 %
KETONES URINE: NEGATIVE
LEUKOCYTE ESTERASE URINE: NEGATIVE
LPL SERPL-CCNC: 57 U/L
LYMPHOCYTES # BLD AUTO: 1.95 K/UL
LYMPHOCYTES NFR BLD AUTO: 38.7 %
MAN DIFF?: NORMAL
MCHC RBC-ENTMCNC: 28 PG
MCHC RBC-ENTMCNC: 31.7 GM/DL
MCV RBC AUTO: 88.3 FL
MICROSCOPIC-UA: NORMAL
MONOCYTES # BLD AUTO: 0.32 K/UL
MONOCYTES NFR BLD AUTO: 6.3 %
NEUTROPHILS # BLD AUTO: 2.7 K/UL
NEUTROPHILS NFR BLD AUTO: 53.6 %
NITRITE URINE: NEGATIVE
PH URINE: 5.5
PLATELET # BLD AUTO: NORMAL K/UL
PLATELET # PLAS AUTO: 246 K/UL
POTASSIUM SERPL-SCNC: 3.6 MMOL/L
POTASSIUM SERPL-SCNC: 3.6 MMOL/L
PROT SERPL-MCNC: 7 G/DL
PROT SERPL-MCNC: 7 G/DL
PROT UR-MCNC: <4 MG/DL
PROTEIN URINE: NEGATIVE
RBC # BLD: 4.71 M/UL
RBC # FLD: 14.3 %
RED BLOOD CELLS URINE: 0 /HPF
SODIUM SERPL-SCNC: 141 MMOL/L
SODIUM SERPL-SCNC: 141 MMOL/L
SPECIFIC GRAVITY URINE: 1.01
SQUAMOUS EPITHELIAL CELLS: 0 /HPF
UROBILINOGEN URINE: NORMAL
WBC # FLD AUTO: 5.04 K/UL
WHITE BLOOD CELLS URINE: 0 /HPF

## 2022-03-22 PROCEDURE — 99215 OFFICE O/P EST HI 40 MIN: CPT

## 2022-03-23 ENCOUNTER — OUTPATIENT (OUTPATIENT)
Dept: OUTPATIENT SERVICES | Facility: HOSPITAL | Age: 57
LOS: 1 days | End: 2022-03-23
Payer: COMMERCIAL

## 2022-03-23 ENCOUNTER — APPOINTMENT (OUTPATIENT)
Dept: CV DIAGNOSITCS | Facility: HOSPITAL | Age: 57
End: 2022-03-23

## 2022-03-23 DIAGNOSIS — Z98.51 TUBAL LIGATION STATUS: Chronic | ICD-10-CM

## 2022-03-23 DIAGNOSIS — I27.20 PULMONARY HYPERTENSION, UNSPECIFIED: ICD-10-CM

## 2022-03-23 DIAGNOSIS — Z98.890 OTHER SPECIFIED POSTPROCEDURAL STATES: Chronic | ICD-10-CM

## 2022-03-23 DIAGNOSIS — Z98.89 OTHER SPECIFIED POSTPROCEDURAL STATES: Chronic | ICD-10-CM

## 2022-03-23 LAB
M TB IFN-G BLD-IMP: NEGATIVE
QUANTIFERON TB PLUS MITOGEN MINUS NIL: 9.97 IU/ML
QUANTIFERON TB PLUS NIL: 0.03 IU/ML
QUANTIFERON TB PLUS TB1 MINUS NIL: 0 IU/ML
QUANTIFERON TB PLUS TB2 MINUS NIL: 0 IU/ML

## 2022-03-23 PROCEDURE — 93306 TTE W/DOPPLER COMPLETE: CPT | Mod: 26

## 2022-03-28 LAB
ALBUMIN MFR SERPL ELPH: 62.4 %
ALBUMIN SERPL-MCNC: 4.4 G/DL
ALBUMIN/GLOB SERPL: 1.7 RATIO
ALPHA1 GLOB MFR SERPL ELPH: 3.7 %
ALPHA1 GLOB SERPL ELPH-MCNC: 0.3 G/DL
ALPHA2 GLOB MFR SERPL ELPH: 8.8 %
ALPHA2 GLOB SERPL ELPH-MCNC: 0.6 G/DL
B-GLOBULIN MFR SERPL ELPH: 10.9 %
B-GLOBULIN SERPL ELPH-MCNC: 0.8 G/DL
DEPRECATED KAPPA LC FREE/LAMBDA SER: 1.6 RATIO
GAMMA GLOB FLD ELPH-MCNC: 1 G/DL
GAMMA GLOB MFR SERPL ELPH: 14.2 %
IGA SER QL IEP: 159 MG/DL
IGG SER QL IEP: 1122 MG/DL
IGM SER QL IEP: 47 MG/DL
INTERPRETATION SERPL IEP-IMP: NORMAL
KAPPA LC CSF-MCNC: 0.88 MG/DL
KAPPA LC SERPL-MCNC: 1.41 MG/DL
M PROTEIN SPEC IFE-MCNC: NORMAL
PROT SERPL-MCNC: 7 G/DL
PROT SERPL-MCNC: 7 G/DL

## 2022-04-04 ENCOUNTER — TRANSCRIPTION ENCOUNTER (OUTPATIENT)
Age: 57
End: 2022-04-04

## 2022-04-06 ENCOUNTER — TRANSCRIPTION ENCOUNTER (OUTPATIENT)
Age: 57
End: 2022-04-06

## 2022-04-10 ENCOUNTER — TRANSCRIPTION ENCOUNTER (OUTPATIENT)
Age: 57
End: 2022-04-10

## 2022-04-10 LAB

## 2022-04-11 PROBLEM — Z11.59 SCREENING FOR VIRAL DISEASE: Status: RESOLVED | Noted: 2020-05-28 | Resolved: 2022-04-11

## 2022-04-27 ENCOUNTER — LABORATORY RESULT (OUTPATIENT)
Age: 57
End: 2022-04-27

## 2022-04-27 ENCOUNTER — TRANSCRIPTION ENCOUNTER (OUTPATIENT)
Age: 57
End: 2022-04-27

## 2022-04-27 ENCOUNTER — APPOINTMENT (OUTPATIENT)
Dept: OBGYN | Facility: CLINIC | Age: 57
End: 2022-04-27
Payer: COMMERCIAL

## 2022-04-27 VITALS
SYSTOLIC BLOOD PRESSURE: 123 MMHG | DIASTOLIC BLOOD PRESSURE: 73 MMHG | WEIGHT: 146 LBS | HEART RATE: 76 BPM | HEIGHT: 66 IN | BODY MASS INDEX: 23.46 KG/M2

## 2022-04-27 DIAGNOSIS — Z86.79 PERSONAL HISTORY OF OTHER DISEASES OF THE CIRCULATORY SYSTEM: ICD-10-CM

## 2022-04-27 DIAGNOSIS — F43.21 ADJUSTMENT DISORDER WITH DEPRESSED MOOD: ICD-10-CM

## 2022-04-27 DIAGNOSIS — M13.10 MONOARTHRITIS, NOT ELSEWHERE CLASSIFIED, UNSPECIFIED SITE: ICD-10-CM

## 2022-04-27 DIAGNOSIS — Z01.42 ENCOUNTER FOR CERVICAL SMEAR TO CONFIRM FINDINGS OF RECENT NORMAL SMEAR FOLLOWING INITIAL ABNORMAL SMEAR: ICD-10-CM

## 2022-04-27 DIAGNOSIS — M21.6X1 OTHER ACQUIRED DEFORMITIES OF RIGHT FOOT: ICD-10-CM

## 2022-04-27 DIAGNOSIS — R76.8 OTHER SPECIFIED ABNORMAL IMMUNOLOGICAL FINDINGS IN SERUM: ICD-10-CM

## 2022-04-27 DIAGNOSIS — R87.612 LOW GRADE SQUAMOUS INTRAEPITHELIAL LESION ON CYTOLOGIC SMEAR OF CERVIX (LGSIL): ICD-10-CM

## 2022-04-27 DIAGNOSIS — L81.8 OTHER SPECIFIED DISORDERS OF PIGMENTATION: ICD-10-CM

## 2022-04-27 PROCEDURE — 99214 OFFICE O/P EST MOD 30 MIN: CPT

## 2022-04-28 ENCOUNTER — TRANSCRIPTION ENCOUNTER (OUTPATIENT)
Age: 57
End: 2022-04-28

## 2022-04-28 LAB
ALBUMIN SERPL ELPH-MCNC: 4.9 G/DL
ALBUMIN SERPL ELPH-MCNC: 4.9 G/DL
ALP BLD-CCNC: 67 U/L
ALP BLD-CCNC: 67 U/L
ALT SERPL-CCNC: 12 U/L
ALT SERPL-CCNC: 12 U/L
ANION GAP SERPL CALC-SCNC: 11 MMOL/L
ANION GAP SERPL CALC-SCNC: 11 MMOL/L
APPEARANCE: CLEAR
AST SERPL-CCNC: 15 U/L
AST SERPL-CCNC: 15 U/L
BACTERIA: NEGATIVE
BASOPHILS # BLD AUTO: 0.03 K/UL
BASOPHILS NFR BLD AUTO: 0.4 %
BILIRUB SERPL-MCNC: 0.9 MG/DL
BILIRUB SERPL-MCNC: 0.9 MG/DL
BILIRUBIN URINE: NEGATIVE
BLOOD URINE: NORMAL
BUN SERPL-MCNC: 12 MG/DL
BUN SERPL-MCNC: 12 MG/DL
C3 SERPL-MCNC: 93 MG/DL
C4 SERPL-MCNC: 41 MG/DL
CALCIUM SERPL-MCNC: 9.9 MG/DL
CALCIUM SERPL-MCNC: 9.9 MG/DL
CHLORIDE SERPL-SCNC: 103 MMOL/L
CHLORIDE SERPL-SCNC: 103 MMOL/L
CK SERPL-CCNC: 139 U/L
CO2 SERPL-SCNC: 28 MMOL/L
CO2 SERPL-SCNC: 28 MMOL/L
COLOR: NORMAL
CREAT SERPL-MCNC: 0.72 MG/DL
CREAT SERPL-MCNC: 0.72 MG/DL
CREAT SPEC-SCNC: 43 MG/DL
CREAT/PROT UR: 0.2 RATIO
CRP SERPL-MCNC: <3 MG/L
EGFR: 96 ML/MIN/1.73M2
EGFR: 96 ML/MIN/1.73M2
EOSINOPHIL # BLD AUTO: 0.03 K/UL
EOSINOPHIL NFR BLD AUTO: 0.4 %
ERYTHROCYTE [SEDIMENTATION RATE] IN BLOOD BY WESTERGREN METHOD: 30 MM/HR
GLUCOSE QUALITATIVE U: NEGATIVE
GLUCOSE SERPL-MCNC: 83 MG/DL
HCT VFR BLD CALC: 44.9 %
HGB BLD-MCNC: 13.9 G/DL
HYALINE CASTS: 0 /LPF
IMM GRANULOCYTES NFR BLD AUTO: 0.4 %
KETONES URINE: NEGATIVE
LEUKOCYTE ESTERASE URINE: ABNORMAL
LYMPHOCYTES # BLD AUTO: 2.06 K/UL
LYMPHOCYTES NFR BLD AUTO: 26.1 %
MAN DIFF?: NORMAL
MCHC RBC-ENTMCNC: 27.3 PG
MCHC RBC-ENTMCNC: 31 GM/DL
MCV RBC AUTO: 88 FL
MICROSCOPIC-UA: NORMAL
MONOCYTES # BLD AUTO: 0.46 K/UL
MONOCYTES NFR BLD AUTO: 5.8 %
NEUTROPHILS # BLD AUTO: 5.28 K/UL
NEUTROPHILS NFR BLD AUTO: 66.9 %
NITRITE URINE: NEGATIVE
PH URINE: 6
PLATELET # BLD AUTO: NORMAL K/UL
POTASSIUM SERPL-SCNC: 3.9 MMOL/L
POTASSIUM SERPL-SCNC: 3.9 MMOL/L
PROT SERPL-MCNC: 7.4 G/DL
PROT SERPL-MCNC: 7.4 G/DL
PROT UR-MCNC: 9 MG/DL
PROTEIN URINE: NEGATIVE
RBC # BLD: 5.1 M/UL
RBC # FLD: 14.5 %
RED BLOOD CELLS URINE: 2 /HPF
SODIUM SERPL-SCNC: 141 MMOL/L
SODIUM SERPL-SCNC: 141 MMOL/L
SPECIFIC GRAVITY URINE: 1.01
SQUAMOUS EPITHELIAL CELLS: 1 /HPF
UROBILINOGEN URINE: NORMAL
WBC # FLD AUTO: 7.89 K/UL
WHITE BLOOD CELLS URINE: 2 /HPF

## 2022-04-28 RX ORDER — MYCOPHENILIC ACID 360 MG/1
360 TABLET, DELAYED RELEASE ORAL
Qty: 180 | Refills: 0 | Status: DISCONTINUED | COMMUNITY
Start: 2022-03-22 | End: 2022-04-28

## 2022-04-30 ENCOUNTER — NON-APPOINTMENT (OUTPATIENT)
Age: 57
End: 2022-04-30

## 2022-05-01 LAB — DSDNA AB SER-ACNC: <12 IU/ML

## 2022-05-05 ENCOUNTER — TRANSCRIPTION ENCOUNTER (OUTPATIENT)
Age: 57
End: 2022-05-05

## 2022-05-09 ENCOUNTER — TRANSCRIPTION ENCOUNTER (OUTPATIENT)
Age: 57
End: 2022-05-09

## 2022-05-10 ENCOUNTER — NON-APPOINTMENT (OUTPATIENT)
Age: 57
End: 2022-05-10

## 2022-05-16 ENCOUNTER — NON-APPOINTMENT (OUTPATIENT)
Age: 57
End: 2022-05-16

## 2022-05-16 ENCOUNTER — APPOINTMENT (OUTPATIENT)
Dept: INTERNAL MEDICINE | Facility: CLINIC | Age: 57
End: 2022-05-16
Payer: COMMERCIAL

## 2022-05-16 ENCOUNTER — APPOINTMENT (OUTPATIENT)
Dept: ORTHOPEDIC SURGERY | Facility: CLINIC | Age: 57
End: 2022-05-16

## 2022-05-16 ENCOUNTER — TRANSCRIPTION ENCOUNTER (OUTPATIENT)
Age: 57
End: 2022-05-16

## 2022-05-16 VITALS
BODY MASS INDEX: 23.78 KG/M2 | RESPIRATION RATE: 16 BRPM | OXYGEN SATURATION: 99 % | TEMPERATURE: 98 F | DIASTOLIC BLOOD PRESSURE: 76 MMHG | WEIGHT: 148 LBS | HEART RATE: 61 BPM | SYSTOLIC BLOOD PRESSURE: 132 MMHG | HEIGHT: 66 IN

## 2022-05-16 VITALS
DIASTOLIC BLOOD PRESSURE: 89 MMHG | SYSTOLIC BLOOD PRESSURE: 152 MMHG | HEIGHT: 66 IN | WEIGHT: 148 LBS | BODY MASS INDEX: 23.78 KG/M2

## 2022-05-16 DIAGNOSIS — Z87.898 PERSONAL HISTORY OF OTHER SPECIFIED CONDITIONS: ICD-10-CM

## 2022-05-16 LAB
CYTOLOGY CVX/VAG DOC THIN PREP: ABNORMAL
HPV HIGH+LOW RISK DNA PNL CVX: DETECTED

## 2022-05-16 PROCEDURE — 99214 OFFICE O/P EST MOD 30 MIN: CPT | Mod: 25

## 2022-05-16 PROCEDURE — 20553 NJX 1/MLT TRIGGER POINTS 3/>: CPT

## 2022-05-16 NOTE — HISTORY OF PRESENT ILLNESS
[FreeTextEntry1] : \par f/u [de-identified] : \par 56 yo F pmhx HTN, HLD, preDM, hx ARAGON, MCTD, necrotizing myopathy, vit d insuff, B12 def for f/u\par \par Last seen 2/15/22 for f/u with labs done.\par \par Feeling well.\par Does not need med refill.\par Not fasting for labs- defers lipids check to nv.\par \par Reports is socially distancing and using precautions for covid prevention.\par Denies sick or covid positive contacts.\par Denies fever, chills, cough or sob.\par -hx Moderna vaccine- 1/21, 2/21, 8/21; booster #2 pending \par \par \par c/o bladder fullness and urine frequency, vaginal itching x 2 weeks- stable\par right sided back pain x 2 weeks- 6-7/10, sharp, on/off.  No help with Tylenol- last taken 1 week ago.  No relation to BM.\par denies f/c, n/v dysuria, hematuria, hesitancy or d/c\par hx kidney stones in her 20s- states passed on own\par denies  surgeries\par \par Notes hx vague pelvic pain for which is following with GYN- states seen 4/22 with TV US ordered (done since at , results pending)\par -hx GYN eval 8/21 with PAP/HPV- found abnl, s/p colposcopy 10/21 with abnl bx, planned to repeat PAP in 1 yr.  Had repeat PAP 4/22 (ASCUS), +HPV - GYN f/u pending\par \par hx grief, depression/anxiety -  reports much improved since having better communication with daughter since 2/22 \par -reports infrequent panic attacks- resolve with deep breathing and prayer.  Denies active depression, HI or SI.  \par -on Klonopin prn with help, on/off (taking 1x qhs mainly)\par -Therapy pending, now declines- feels is not needed\par -hx onset 2/2 mom passing away in 4/20 (was at a NH and endstage COPD), general life issues and covid pandemic related concerns\par \par HTN, hx chest discomfort (tightness feeling) - reports resolved\par -hx CP: on/off a/w bilateral breast discomfort "pinching pain", notes with onset feels sensation of shortness of breath- all symptoms self resolve in seconds.  No inciting factors known.  Denies association with activity or mood.  Denies fever, chills, dizziness, palpitations or sweats.  Denies breast mass, rash or nipple discharge\par -on med regimen, adherent\par -has low salt diet\par -followed by cardio, Dr. Rivero noted with atypical CP, advisd echo (done 3/22). F/U pending.\par -hx optho eval 10/21, told had nl dilated exam and to f/u in 1 yr\par -followed by breast specialist, seen 3/22 for eval with diagnostic mammo/US ordered (done 4/22), planned for repeat US in 6mo. Has f/u 6/22.  States using OTC supp (primrose oil as advised by MD with help)\par \par Reports exercising  sessions 2x/wk x 40 mins- done w/o sx's or limitations\par \par Reports eating low fat/carb on/off\par Reports nl appetite and BMs.  \par Denies GI complaints.\par \par hx MCTD, necrotizing myopathy (hx muscle bx), hx imbalance (improved)/ /HAs (resolved)-  reports worsening left shoulder pain, occ paresthesias and weakness\par -followed by rheum, seen 3/22 with labs done, Cellcept changed to Myforic due to GI sx's reported- has since restarted Cellcept as prior Rx caused aching (now resolved off per pt).  F/U pending.\par -reports hx improved imbalance/HAs (hx negative CTH 3/20)- notes HAs resolved since 5/20.  States saw neuro 3/21, advised to continue gabapentin and physical therapy and follow-up in 6 months\par -doing PT BIW for left shoulder, helps some\par -ortho eval re: shoulder pending today\par -no OTC pain meds taken\par

## 2022-05-16 NOTE — HISTORY OF PRESENT ILLNESS
[FreeTextEntry1] : 56yo P2 presents for f/up of colposcopy in Nov.  bx revealed CIN1.  Pt also c/o suprapubic pain intermittent for past 2 months; pain is occas sharp.  She denies other c/o.  No wt changes

## 2022-05-16 NOTE — REVIEW OF SYSTEMS
[Negative] : Cardiovascular [FreeTextEntry8] : see HPI [FreeTextEntry9] : see HPI [de-identified] : see HPI [de-identified] : see HPI

## 2022-05-16 NOTE — ASSESSMENT
[FreeTextEntry1] : \par 58 yo F pmhx HTN, HLD, preDM, hx ARAGON, MCTD, necrotizing myopathy, vit d insuff, B12 def for f/u\par \par \par bladder fullness, urine frequency, vaginal itching, right LBP; hx pelvic pain-  x 2 weeks, stable.  Exam unremarkable.\par -check UA/Ucx, cbc/bmp\par -check BV panel, collected\par -check US renal (hx kidney stones)\par -followed by GYN, recent TV sono done- results pending.  F/U advised.\par -will given empiric miconazole\par -advised prompt medical eval if sx's worsen or new sx's arise\par \par HTN, atypical CP, hx abnl EKG- hx new atypical chest pain associated with breast discomfort.  Resolved.\par -on amlodipine- benazepril and HCTZ 12.5 mg qday\par -7/18 echo: EF 69%, mild MR, borderline pulm HTN, nl LV fxn and size, mild diastolic dysfxn. \par -8/19 echo: EF 65%, nl LV fxn, mild pulm HTN, min MR, mild-mod TR. No PFO.\par -11/20 echo: EF 65-70%, no significant change c/w 7/18\par -11/20 exercise stress test: normal study\par -1/21 cxr: clear lungs\par -8/21 b/l dx'ic mammo/US: benign, rec: annual mammo\par -8/21 UA trc prt, urine prt/crt wnl\par -11/21 cmp/TSH wnl, plt (blue top) 278\par -4/22 cmp wnl, cbc wnl, except plt clumped (was 246 in 3/22)\par -followed by cardio, Dr. Rivero noted with atypical CP, advised echo (done 3/22). F/U pending.\par -followed by optho, hx eval 10/21 with nl dilated exam- yearly advised\par -followed by breast specialist, seen 3/22 for eval with diagnostic mammo/US ordered (done 4/22), planned for repeat US in 6mo.  Has f/u 6/22\par -low salt diet advised\par -advised prompt medical eval if sx's recur\par \par hx MCTD, necrotizing myopathy (hx muscle bx), hx imbalance (improved)/ /HAs (resolved)- \par -9/21 left shoulder MRI: Marrow edema within the distal clavicle which may be related to stress reaction or posttraumatic osteolysis in the setting of recent trauma.\par -followed by rheum, seen 3/22 with labs done, Cellcept changed to Myforic due to GI sx's reported- has since restarted Cellcept as prior Rx caused aching (now resolved off per pt).  F/U pending.\par -hx serum sickness with IVIg, avoiding during covid pandemic as high risk with use per pt\par -on CellCept (restarted fall 2019)\par -on Rituxan since 4/19; Neurontin, etodolac since 9/20 (when diclofenac d/c'd by rheum)\par -doing PT for left shoulder\par -ortho eval re: shoulder pending today\par -followed by neuro, seen 3/21, advised to continue gabapentin and physical therapy and follow-up in 6 months- pending, states in process of obtaining new provider - declines referral\par -hx nl optho eval 10/21 \par -4/22 cmp wnl, cbc wnl, except plt clumped (was 246 in 3/22)\par -check plt in blue top tube\par \par hx ARAGON- resolved per pt\par -seen by pulm 7/18, states called and told was "age appropriate" and advised increased exercise only. Office f/u pending.\par -followed by cardio, Dr. Rivero noted with atypical CP, advisd echo (done 3/22). F/U pending.\par \par preDM- 2/22 A1c 5.6 (was 5.9) \par -ADA diet, exercise encouraged\par \par HLD- better, 2/22 Tchol 238 TG 76  HDL 96; ASCV risk < 7.5\par -low fat diet, exercise encouraged\par -check lipids nv when fasting\par \par GERD, IBS/constipation, hx hemorrhoids-\par -followed by GI\par -hx c-scope by Dr. Hammond in 4/18- told nl, advised repeat in 5 yrs.\par -hx omeprazole prn\par -on Metamucil prn\par -on prep H prn\par -asked to forward records\par \par hx stress 2/2 mother's death in 4/20, depression/anxiety a/w grief- reports improved, mood better; denies acute sx's\par -declines standing mood medication (ie SSRI)\par -on clonazepam with help- refilled prior, istop checked.  Risks/benefits/SEs discussed. Advised to avoid concurrent use with Ambien (given by rheum, now off), amitriptyline and ETOH given potential additive effects. Advised of need for visit for further refills.\par - referral given aprior- now declines.\par -info for behavioral health crisis walk-in center given prior (confirms has info)\par -advised to f/u if sx's worsen\par \par hx vit d def-\par -on OTC supp\par -11/21 level wnl\par \par hx B12 def-\par -11/21 B12/folate wnl \par -on oral supplement\par \par hx skin rash, itching- dx'd eczematous dermatitis by derm 3/19 with amitriptyline dose increased. Resolved.\par -followed by derm\par -on amitriptyline\par -hx FBSE 9/18, yearly advised. Regular use of sun block for skin cancer prevention advised.\par \par MISC:  Continued social distancing and measure for covid19 prevention encouraged.  \par -hx Moderna vaccine- 1/21, 2/21, 8/21; booster #2 advised.  Advised to also consider [place] and f/u if desires.\par \par \par HCM\par -hx CPE 11/21, yearly advised\par -hx negative hep C screening 4/20\par -hx flu shot 10/21\par -hx Tdap 8/18\par -advised to check on shingrix insurance coverage and get okay to get from rheum prior to considering\par -hx abnl PAP 8/21 (LSIL), HPV + , s/p colposcopy 10/21 path: +FLEX I.  Had repeat PAP 4/22 (ASCUS), +HPV- GYN f/u pending\par -hx nl DEXA 12/19, repeat pending per rheum\par -hx screening colonoscopy 4/18, rec: repeat in 5 yrs\par \par Pt's cell: [phone]\par \par

## 2022-05-16 NOTE — PHYSICAL EXAM
[No Acute Distress] : no acute distress [Well-Appearing] : well-appearing [Normal Sclera/Conjunctiva] : normal sclera/conjunctiva [PERRL] : pupils equal round and reactive to light [EOMI] : extraocular movements intact [Normal Outer Ear/Nose] : the outer ears and nose were normal in appearance [Normal Oropharynx] : the oropharynx was normal [Normal Nasal Mucosa] : the nasal mucosa was normal [No Lymphadenopathy] : no lymphadenopathy [Supple] : supple [Thyroid Normal, No Nodules] : the thyroid was normal and there were no nodules present [No Respiratory Distress] : no respiratory distress  [Clear to Auscultation] : lungs were clear to auscultation bilaterally [Normal Rate] : normal rate  [Regular Rhythm] : with a regular rhythm [Normal S1, S2] : normal S1 and S2 [No Murmur] : no murmur heard [Pedal Pulses Present] : the pedal pulses are present [No Edema] : there was no peripheral edema [Soft] : abdomen soft [No HSM] : no HSM [Non Tender] : non-tender [Normal Supraclavicular Nodes] : no supraclavicular lymphadenopathy [Normal Posterior Cervical Nodes] : no posterior cervical lymphadenopathy [Normal Anterior Cervical Nodes] : no anterior cervical lymphadenopathy [No CVA Tenderness] : no CVA  tenderness [No Spinal Tenderness] : no spinal tenderness [No Joint Swelling] : no joint swelling [Grossly Normal Strength/Tone] : grossly normal strength/tone [No Rash] : no rash [No Focal Deficits] : no focal deficits [Normal Gait] : normal gait [Normal Affect] : the affect was normal [Alert and Oriented x3] : oriented to person, place, and time [Urethral Meatus] : normal urethra [Vagina] : normal vaginal exam [Normal Inguinal Nodes] : no inguinal lymphadenopathy [Normal Femoral Nodes] : no femoral lymphadenopathy [FreeTextEntry1] : no rash, visible d/c or odor [de-identified] : scattered freckles

## 2022-05-16 NOTE — PHYSICAL EXAM
[Chaperone Present] : A chaperone was present in the examining room during all aspects of the physical examination [Labia Majora] : normal [Labia Minora] : normal [Normal] : normal [Uterine Adnexae] : normal

## 2022-05-17 LAB
APPEARANCE: CLEAR
BACTERIA: NEGATIVE
BILIRUBIN URINE: NEGATIVE
BLOOD URINE: NEGATIVE
CANDIDA VAG CYTO: NOT DETECTED
COLOR: COLORLESS
G VAGINALIS+PREV SP MTYP VAG QL MICRO: NOT DETECTED
GLUCOSE QUALITATIVE U: NEGATIVE
HYALINE CASTS: 1 /LPF
KETONES URINE: NEGATIVE
LEUKOCYTE ESTERASE URINE: NEGATIVE
MICROSCOPIC-UA: NORMAL
NITRITE URINE: NEGATIVE
PH URINE: 5
PROTEIN URINE: NEGATIVE
RED BLOOD CELLS URINE: 0 /HPF
SPECIFIC GRAVITY URINE: 1.01
SQUAMOUS EPITHELIAL CELLS: 0 /HPF
T VAGINALIS VAG QL WET PREP: NOT DETECTED
UROBILINOGEN URINE: NORMAL
WHITE BLOOD CELLS URINE: 0 /HPF

## 2022-05-20 ENCOUNTER — TRANSCRIPTION ENCOUNTER (OUTPATIENT)
Age: 57
End: 2022-05-20

## 2022-05-21 ENCOUNTER — NON-APPOINTMENT (OUTPATIENT)
Age: 57
End: 2022-05-21

## 2022-05-23 ENCOUNTER — TRANSCRIPTION ENCOUNTER (OUTPATIENT)
Age: 57
End: 2022-05-23

## 2022-05-23 LAB
ANION GAP SERPL CALC-SCNC: 14 MMOL/L
BACTERIA UR CULT: ABNORMAL
BASOPHILS # BLD AUTO: 0.05 K/UL
BASOPHILS NFR BLD AUTO: 0.7 %
BUN SERPL-MCNC: 20 MG/DL
CALCIUM SERPL-MCNC: 9.6 MG/DL
CHLORIDE SERPL-SCNC: 105 MMOL/L
CO2 SERPL-SCNC: 23 MMOL/L
CREAT SERPL-MCNC: 0.71 MG/DL
EGFR: 98 ML/MIN/1.73M2
EJ AB SER QL: NEGATIVE
ENA JO1 AB SER IA-ACNC: <20 UNITS
ENA PM/SCL AB SER-ACNC: <20 UNITS
ENA SM+RNP AB SER IA-ACNC: <20 UNITS
ENA SS-A IGG SER QL: <20 UNITS
EOSINOPHIL # BLD AUTO: 0.06 K/UL
EOSINOPHIL NFR BLD AUTO: 0.9 %
FIBRILLARIN AB SER QL: NEGATIVE
GLUCOSE SERPL-MCNC: 86 MG/DL
HCT VFR BLD CALC: 41.4 %
HGB BLD-MCNC: 12.9 G/DL
IMM GRANULOCYTES NFR BLD AUTO: 0.3 %
KU AB SER QL: NEGATIVE
LYMPHOCYTES # BLD AUTO: 2.18 K/UL
LYMPHOCYTES NFR BLD AUTO: 31.3 %
MAN DIFF?: NORMAL
MCHC RBC-ENTMCNC: 27.6 PG
MCHC RBC-ENTMCNC: 31.2 GM/DL
MCV RBC AUTO: 88.5 FL
MDA-5 (P140)(CADM-140): <20 UNITS
MI2 AB SER QL: NEGATIVE
MONOCYTES # BLD AUTO: 0.47 K/UL
MONOCYTES NFR BLD AUTO: 6.7 %
NEUTROPHILS # BLD AUTO: 4.19 K/UL
NEUTROPHILS NFR BLD AUTO: 60.1 %
NXP-2 (P140): <20 UNITS
OJ AB SER QL: NEGATIVE
PL12 AB SER QL: NEGATIVE
PL7 AB SER QL: NEGATIVE
PLATELET # BLD AUTO: 215 K/UL
PLATELET # PLAS AUTO: 289 K/UL
POTASSIUM SERPL-SCNC: 3.9 MMOL/L
RBC # BLD: 4.68 M/UL
RBC # FLD: 15.4 %
SODIUM SERPL-SCNC: 142 MMOL/L
SRP AB SERPL QL: ABNORMAL
TIF GAMMA (P155/140): <20 UNITS
U2 SNRNP AB SER QL: NEGATIVE
WBC # FLD AUTO: 6.97 K/UL

## 2022-05-24 ENCOUNTER — APPOINTMENT (OUTPATIENT)
Dept: RHEUMATOLOGY | Facility: CLINIC | Age: 57
End: 2022-05-24
Payer: COMMERCIAL

## 2022-05-24 VITALS
DIASTOLIC BLOOD PRESSURE: 89 MMHG | HEIGHT: 66 IN | BODY MASS INDEX: 23.46 KG/M2 | HEART RATE: 54 BPM | TEMPERATURE: 97.3 F | WEIGHT: 146 LBS | SYSTOLIC BLOOD PRESSURE: 186 MMHG | OXYGEN SATURATION: 97 %

## 2022-05-24 DIAGNOSIS — R74.8 ABNORMAL LEVELS OF OTHER SERUM ENZYMES: ICD-10-CM

## 2022-05-24 PROCEDURE — 99214 OFFICE O/P EST MOD 30 MIN: CPT

## 2022-05-26 ENCOUNTER — TRANSCRIPTION ENCOUNTER (OUTPATIENT)
Age: 57
End: 2022-05-26

## 2022-05-27 ENCOUNTER — TRANSCRIPTION ENCOUNTER (OUTPATIENT)
Age: 57
End: 2022-05-27

## 2022-06-01 ENCOUNTER — LABORATORY RESULT (OUTPATIENT)
Age: 57
End: 2022-06-01

## 2022-06-02 ENCOUNTER — NON-APPOINTMENT (OUTPATIENT)
Age: 57
End: 2022-06-02

## 2022-06-03 ENCOUNTER — TRANSCRIPTION ENCOUNTER (OUTPATIENT)
Age: 57
End: 2022-06-03

## 2022-06-03 LAB
25(OH)D3 SERPL-MCNC: 34.4 NG/ML
ALBUMIN SERPL ELPH-MCNC: 4.6 G/DL
ALBUMIN SERPL ELPH-MCNC: 4.6 G/DL
ALP BLD-CCNC: 58 U/L
ALP BLD-CCNC: 58 U/L
ALT SERPL-CCNC: 9 U/L
ALT SERPL-CCNC: 9 U/L
ANION GAP SERPL CALC-SCNC: 16 MMOL/L
ANION GAP SERPL CALC-SCNC: 16 MMOL/L
APPEARANCE: ABNORMAL
AST SERPL-CCNC: 13 U/L
AST SERPL-CCNC: 13 U/L
BACTERIA: NEGATIVE
BASOPHILS # BLD AUTO: 0.03 K/UL
BASOPHILS NFR BLD AUTO: 0.5 %
BILIRUB SERPL-MCNC: 0.8 MG/DL
BILIRUB SERPL-MCNC: 0.8 MG/DL
BILIRUBIN URINE: NEGATIVE
BLOOD URINE: NEGATIVE
BUN SERPL-MCNC: 13 MG/DL
BUN SERPL-MCNC: 13 MG/DL
C3 SERPL-MCNC: 106 MG/DL
C4 SERPL-MCNC: 44 MG/DL
CALCIUM OXALATE CRYSTALS: ABNORMAL
CALCIUM SERPL-MCNC: 9.5 MG/DL
CALCIUM SERPL-MCNC: 9.5 MG/DL
CHLORIDE SERPL-SCNC: 106 MMOL/L
CHLORIDE SERPL-SCNC: 106 MMOL/L
CHOLEST SERPL-MCNC: 245 MG/DL
CK SERPL-CCNC: 128 U/L
CO2 SERPL-SCNC: 21 MMOL/L
CO2 SERPL-SCNC: 21 MMOL/L
COLOR: YELLOW
CREAT SERPL-MCNC: 0.71 MG/DL
CREAT SERPL-MCNC: 0.71 MG/DL
CREAT SPEC-SCNC: 275 MG/DL
CREAT/PROT UR: 0.1 RATIO
CRP SERPL-MCNC: <3 MG/L
DSDNA AB SER-ACNC: <12 IU/ML
EGFR: 98 ML/MIN/1.73M2
EGFR: 98 ML/MIN/1.73M2
EOSINOPHIL # BLD AUTO: 0.05 K/UL
EOSINOPHIL NFR BLD AUTO: 0.9 %
ERYTHROCYTE [SEDIMENTATION RATE] IN BLOOD BY WESTERGREN METHOD: 27 MM/HR
GLUCOSE QUALITATIVE U: NEGATIVE
GLUCOSE SERPL-MCNC: 87 MG/DL
HCT VFR BLD CALC: 42.8 %
HDLC SERPL-MCNC: 93 MG/DL
HGB BLD-MCNC: 13.3 G/DL
HYALINE CASTS: 3 /LPF
IMM GRANULOCYTES NFR BLD AUTO: 0.2 %
KETONES URINE: NEGATIVE
LDLC SERPL CALC-MCNC: 135 MG/DL
LEUKOCYTE ESTERASE URINE: NEGATIVE
LYMPHOCYTES # BLD AUTO: 1.97 K/UL
LYMPHOCYTES NFR BLD AUTO: 35.2 %
MAN DIFF?: NORMAL
MCHC RBC-ENTMCNC: 27.3 PG
MCHC RBC-ENTMCNC: 31.1 GM/DL
MCV RBC AUTO: 87.7 FL
MICROSCOPIC-UA: NORMAL
MONOCYTES # BLD AUTO: 0.32 K/UL
MONOCYTES NFR BLD AUTO: 5.7 %
NEUTROPHILS # BLD AUTO: 3.21 K/UL
NEUTROPHILS NFR BLD AUTO: 57.5 %
NITRITE URINE: NEGATIVE
NONHDLC SERPL-MCNC: 152 MG/DL
PH URINE: 6
PLATELET # BLD AUTO: NORMAL K/UL
POTASSIUM SERPL-SCNC: 4.1 MMOL/L
POTASSIUM SERPL-SCNC: 4.1 MMOL/L
PROT SERPL-MCNC: 6.8 G/DL
PROT SERPL-MCNC: 6.8 G/DL
PROT UR-MCNC: 18 MG/DL
PROTEIN URINE: ABNORMAL
RBC # BLD: 4.88 M/UL
RBC # FLD: 15.3 %
RED BLOOD CELLS URINE: 2 /HPF
SODIUM SERPL-SCNC: 144 MMOL/L
SODIUM SERPL-SCNC: 144 MMOL/L
SPECIFIC GRAVITY URINE: 1.04
SQUAMOUS EPITHELIAL CELLS: 1 /HPF
TRIGL SERPL-MCNC: 84 MG/DL
UROBILINOGEN URINE: NORMAL
WBC # FLD AUTO: 5.59 K/UL
WHITE BLOOD CELLS URINE: 1 /HPF

## 2022-06-06 ENCOUNTER — NON-APPOINTMENT (OUTPATIENT)
Age: 57
End: 2022-06-06

## 2022-06-06 ENCOUNTER — APPOINTMENT (OUTPATIENT)
Dept: ORTHOPEDIC SURGERY | Facility: CLINIC | Age: 57
End: 2022-06-06

## 2022-06-06 VITALS
OXYGEN SATURATION: 97 % | HEART RATE: 70 BPM | BODY MASS INDEX: 23.46 KG/M2 | WEIGHT: 146 LBS | DIASTOLIC BLOOD PRESSURE: 90 MMHG | SYSTOLIC BLOOD PRESSURE: 144 MMHG | HEIGHT: 66 IN

## 2022-06-06 PROCEDURE — 20553 NJX 1/MLT TRIGGER POINTS 3/>: CPT

## 2022-06-06 PROCEDURE — 99214 OFFICE O/P EST MOD 30 MIN: CPT | Mod: 25

## 2022-06-09 ENCOUNTER — APPOINTMENT (OUTPATIENT)
Dept: SURGICAL ONCOLOGY | Facility: CLINIC | Age: 57
End: 2022-06-09
Payer: COMMERCIAL

## 2022-06-09 VITALS
DIASTOLIC BLOOD PRESSURE: 87 MMHG | WEIGHT: 146 LBS | BODY MASS INDEX: 23.46 KG/M2 | RESPIRATION RATE: 16 BRPM | TEMPERATURE: 97.2 F | OXYGEN SATURATION: 99 % | HEIGHT: 66 IN | SYSTOLIC BLOOD PRESSURE: 139 MMHG | HEART RATE: 58 BPM

## 2022-06-09 PROCEDURE — 99213 OFFICE O/P EST LOW 20 MIN: CPT

## 2022-06-09 NOTE — REVIEW OF SYSTEMS
[Negative] : Heme/Lymph [FreeTextEntry5] : Hypertension [de-identified] : Myopathy [de-identified] : Neuropathy [de-identified] : Diabetes

## 2022-06-09 NOTE — ASSESSMENT
[FreeTextEntry1] : Clinically, her bilateral breast pain and tenderness appears to be fibrocystic and fibroglandular in nature.\par \par Her symptoms have improved with the use of primrose oil.\par She has no new signs or symptoms related to either breast.\par \par \par Her breast cancer risk score is 13.0%.\par \par \par 4/1/2022:\par Bilateral mammogram and sonogram at : [facility].\par Bilateral breast ultrasound is recommended at a 6-month interval, October 2022.\par Prescriptions mailed 4/4/2022; I confirmed today that she received the prescription.\par I also gave her prescriptions for her annual bilateral mammogram and sonogram in April 2023.\par \par \par No worrisome findings on today's examination.\par \par I have asked to see her in another year, sooner if needed

## 2022-06-09 NOTE — HISTORY OF PRESENT ILLNESS
[de-identified] : 57-year-old lady.\par \par Schedule follow-up breast examination.\par \par Index visit:\par 2022:\par Bilateral mastalgia.\par Left >right.\par \par My PE:\par + Bilateral, diffuse tenderness.\par No discrete visible or palpable abnormalities.\par \par 2022:\par Bilateral mammogram and sonogram at [facility]: [facility].\par 6-month follow-up bilateral breast ultrasounds due in 2022.\par I had mailed her prescriptions in 2022.\par \par \par She returns for scheduled follow-up today.\par Her mastalgia has almost resolved with the use of primrose oil.\par No new signs or symptoms related to either breast.\par \par \par 57-year-old lady referred by her internist Dr. Indiana RODRIGUES for the evaluation and management of BREAST PAIN (bilateral, in the tail of each breast).\par \par Left >right.\par \par No other signs or symptoms related to either breast.\par \par Onset of pain is ~end of 2022.\par No antecedent event/strain/injury.\par Initially intermittent without provocative or palliative factors.\par Now fairly constant.\par \par No other signs or symptoms related to either breast.\par \par \par Possible contributing factors:\par <2 caffeinated beverages daily.\par No nicotine.\par No exogenous hormones.\par \par \par 2021:\par Bilateral mammogram and breast ultrasounds at [facility]: [facility].\par \par \par + Prior personal history:\par Bilateral breast biopsies:\par ~Late : Excisional right breast biopsy: Benign.\par ~: Excisional left breast biopsy: Benign.\par Both procedures were at [facility].\par \par No other procedures related to either breast.\par \par No personal history of malignancy..\par \par \par No relatives with breast cancer.\par No relatives with ovarian cancer.\par \par Not Ashkenazi.\par \par The only relative with a history of malignancy:\par Maternal grandmother: Lung cancer.\par \par \par Menarche at 12.\par  2, para 2, first at 22.\par Natural menopause at 49.\par No HRT.\par \par \par Breast cancer risk score =13.0%.\par \par \par PMD: Dr. Indiana RODRIGUES.\par \par No pacemaker or defibrillator.\par No anticoagulants.\par \par +DIABETES.\par Treated with Metformin.\par She does not see an endocrinologist.\par \par + Hypertension.\par She takes amlodipine and hydrochlorothiazide.\par Cardiology: Dr. Shaun ELENA\par \par + Peripheral neuropathy.\par + Necrotizing myopathy\par She takes gabapentin and diclofenac.\par Rheumatology: Dr. Lakeisha DUNCAN\par She also takes CellCept\par \par GERD for which she takes omeprazole.\par \par + History of IBS..\par \par GI: Dr. Prosper ABRAHAM.\par \par \par + Depression/anxiety.\par She takes daily Klonopin.\par \par \par GYN: Dr. Rosa CASTILLO.\par 2021 visit was unremarkable.\par \par \par Colonoscopy in 2018 with Dr. Prosper Abraham was unremarkable.

## 2022-06-09 NOTE — REASON FOR VISIT
[Follow-Up Visit] : a follow-up visit for [Other: _____] : [unfilled] [FreeTextEntry2] : Bilateral mastalgia, breast cancer risk score = 13.0

## 2022-06-09 NOTE — PHYSICAL EXAM
[Normal] : supple, no neck mass and thyroid not enlarged [Normal Neck Lymph Nodes] : normal neck lymph nodes  [Normal Supraclavicular Lymph Nodes] : normal supraclavicular lymph nodes [Normal Axillary Lymph Nodes] : normal axillary lymph nodes [Normal] : normal appearance, no rash, nodules, vesicles, ulcers, erythema [de-identified] : Groins not examined [de-identified] : Below

## 2022-06-13 ENCOUNTER — TRANSCRIPTION ENCOUNTER (OUTPATIENT)
Age: 57
End: 2022-06-13

## 2022-06-14 ENCOUNTER — TRANSCRIPTION ENCOUNTER (OUTPATIENT)
Age: 57
End: 2022-06-14

## 2022-06-14 ENCOUNTER — OUTPATIENT (OUTPATIENT)
Dept: OUTPATIENT SERVICES | Facility: HOSPITAL | Age: 57
LOS: 1 days | End: 2022-06-14

## 2022-06-14 ENCOUNTER — APPOINTMENT (OUTPATIENT)
Dept: DISASTER EMERGENCY | Facility: HOSPITAL | Age: 57
End: 2022-06-14

## 2022-06-14 VITALS
TEMPERATURE: 100 F | SYSTOLIC BLOOD PRESSURE: 143 MMHG | RESPIRATION RATE: 19 BRPM | HEART RATE: 65 BPM | OXYGEN SATURATION: 98 % | DIASTOLIC BLOOD PRESSURE: 81 MMHG

## 2022-06-14 VITALS
RESPIRATION RATE: 17 BRPM | HEART RATE: 76 BPM | OXYGEN SATURATION: 99 % | HEIGHT: 66 IN | TEMPERATURE: 99 F | SYSTOLIC BLOOD PRESSURE: 149 MMHG | DIASTOLIC BLOOD PRESSURE: 90 MMHG | WEIGHT: 145.95 LBS

## 2022-06-14 DIAGNOSIS — U07.1 COVID-19: ICD-10-CM

## 2022-06-14 DIAGNOSIS — Z98.89 OTHER SPECIFIED POSTPROCEDURAL STATES: Chronic | ICD-10-CM

## 2022-06-14 DIAGNOSIS — Z98.51 TUBAL LIGATION STATUS: Chronic | ICD-10-CM

## 2022-06-14 DIAGNOSIS — Z98.890 OTHER SPECIFIED POSTPROCEDURAL STATES: Chronic | ICD-10-CM

## 2022-06-14 RX ORDER — BEBTELOVIMAB 87.5 MG/ML
175 INJECTION, SOLUTION INTRAVENOUS ONCE
Refills: 0 | Status: COMPLETED | OUTPATIENT
Start: 2022-06-14 | End: 2022-06-14

## 2022-06-14 RX ADMIN — BEBTELOVIMAB 175 MILLIGRAM(S): 87.5 INJECTION, SOLUTION INTRAVENOUS at 08:00

## 2022-06-14 NOTE — MONOCLONAL ANTIBODY INFUSION - EXAM
CC: Monoclonal Antibody Infusion/COVID 19 Positive  57yFemale with a PMHx of RA, mixed tissue connective disorder, and HTN testing positive for COVID presenting for MAB.     Positive COVID test date: 6/12/22    exam/findings:  T(C): 37.3 (06-14-22 @ 07:50), Max: 37.3 (06-14-22 @ 07:50)  HR: 76 (06-14-22 @ 07:50) (76 - 76)  BP: 149/90 (06-14-22 @ 07:50) (149/90 - 149/90)  RR: 17 (06-14-22 @ 07:50) (17 - 17)  SpO2: 99% (06-14-22 @ 07:50) (99% - 99%)      PE:   Appearance: NAD	  HEENT:   Normal oral mucosa,   Lymphatic: No lymphadenopathy  Cardiovascular: Normal S1 S2, No JVD, No murmurs, No edema  Respiratory: Lungs clear to auscultation	  Gastrointestinal:  Soft, Non-tender, + BS	  Skin: warm and dry  Neurologic: Non-focal  Extremities: Normal range of motion,

## 2022-06-14 NOTE — MONOCLONAL ANTIBODY INFUSION - ASSESSMENT AND PLAN
ASSESSMENT:  Pt is a 57y year old Female COVID positive and symptomatic who was referred for a single injection of Bebtelovimab monoclonal antibody treatment.    Symptoms: sore throat, fever, cough, GI sx, malaise  Risk Profile: immuno suppressive disease & tx, mixed connective tissue disorder, HTN  Vaccination Status: moderna x2, boosted x1    PLAN:  - Injection procedure explained to patient   - Consent for monoclonal antibody infusion obtained   - Risk & benefits discussed/all questions answered  - Injection of Bebtelovimab  - Will observe patient for one hour post injection and then discharge home with outpatient follow up as planned by PMD.    POST INFUSION ASSESSMENT:   DISCHARGE at approximately  0900  hours    - Patient tolerated injection - well denies complaints of chest pain, SOB, dizziness or palpitations  - VSS for discharge home  - D/C instructions given/ fact sheet included.  - Patient to follow-up with PCP as needed.

## 2022-06-15 ENCOUNTER — TRANSCRIPTION ENCOUNTER (OUTPATIENT)
Age: 57
End: 2022-06-15

## 2022-06-17 ENCOUNTER — TRANSCRIPTION ENCOUNTER (OUTPATIENT)
Age: 57
End: 2022-06-17

## 2022-06-17 ENCOUNTER — APPOINTMENT (OUTPATIENT)
Dept: INTERNAL MEDICINE | Facility: CLINIC | Age: 57
End: 2022-06-17
Payer: COMMERCIAL

## 2022-06-17 PROCEDURE — 99212 OFFICE O/P EST SF 10 MIN: CPT | Mod: 95

## 2022-06-17 NOTE — ASSESSMENT
[FreeTextEntry1] : \par 56 yo F pmhx HTN, HLD, preDM, hx ARAGON, MCTD, necrotizing myopathy, vit d insuff, B12 def for acute visit\par \par \par covid infection- s/p MAB 6/14/22, day # 4 of home quarantine, improving sx's\par -advised rest, increased hydration and OTC meds for sx's (Tylenol, Mucinex, Lozenges, etc).  Declines Rx for cough.\par -advised to notify close contacts of diagnosis\par -advised home quarantine x 5 d, may discontinue if improving and afebrile x 24 hrs off fever reducing medications, but should continue with mask until asymptomatic\par -advised should seek prompt ER if CP or sob\par -declines f/u appt, states will call back to schedule\par -hx Moderna vaccine- 1/21, 2/21, 8/21; booster #2 advised (once current sx's resolved). Advised to also consider [place] in future and f/u if desires.\par \par Pt has prior scheduled office f/u appt 8/16/22.  Advised f/u sooner as needed.\par \par

## 2022-06-17 NOTE — HISTORY OF PRESENT ILLNESS
[Home] : at home, [unfilled] , at the time of the visit. [Medical Office: ([facility])___] : at the medical office located in  [Verbal consent obtained from patient] : the patient, [unfilled] [FreeTextEntry8] : \par As this is a telemedicine visit, no physical exam could be performed.  Diagnosis and treatment is based on symptoms provided.\par \par \par 56 yo F pmhx HTN, HLD, preDM, hx ARAGON, MCTD, necrotizing myopathy, vit d insuff, B12 def for acute visit\par \par cc: covid positive, persistent cough\par \par hx +covid testing 6/13/22- tested after grandchild who was visit was sick with fever and had onset of not feeling well\par had flu-like sx's with gen aching and malaise since 6/13/22- notes started home quarantine then.\par Is s/p MAB 6/14/22 by rheum\par \par Overall feeling better with sx's, ~ 85%\par still dry cough and nasal stuffiness, min HA on/off\par Taking Mucinex and throat lozenges\par Tylenol use helps- last use yesterday\par \par Denies fever, chills, CP, sob, n/v or abd pain.\par Reports improving appetite and keeping up with fluids.\par Reports nl BMs.\par \par Reports is socially distancing and using precautions for covid prevention. RTW remote today.\par hx Moderna series and booster x1 in 8/21\par

## 2022-06-28 ENCOUNTER — OUTPATIENT (OUTPATIENT)
Dept: OUTPATIENT SERVICES | Facility: HOSPITAL | Age: 57
LOS: 1 days | End: 2022-06-28
Payer: COMMERCIAL

## 2022-06-28 ENCOUNTER — APPOINTMENT (OUTPATIENT)
Dept: ULTRASOUND IMAGING | Facility: CLINIC | Age: 57
End: 2022-06-28

## 2022-06-28 DIAGNOSIS — Z98.51 TUBAL LIGATION STATUS: Chronic | ICD-10-CM

## 2022-06-28 DIAGNOSIS — Z98.890 OTHER SPECIFIED POSTPROCEDURAL STATES: Chronic | ICD-10-CM

## 2022-06-28 DIAGNOSIS — M54.50 LOW BACK PAIN, UNSPECIFIED: ICD-10-CM

## 2022-06-28 DIAGNOSIS — Z00.8 ENCOUNTER FOR OTHER GENERAL EXAMINATION: ICD-10-CM

## 2022-06-28 DIAGNOSIS — Z98.89 OTHER SPECIFIED POSTPROCEDURAL STATES: Chronic | ICD-10-CM

## 2022-06-28 PROCEDURE — 76770 US EXAM ABDO BACK WALL COMP: CPT | Mod: 26

## 2022-06-28 PROCEDURE — 76770 US EXAM ABDO BACK WALL COMP: CPT

## 2022-06-29 ENCOUNTER — TRANSCRIPTION ENCOUNTER (OUTPATIENT)
Age: 57
End: 2022-06-29

## 2022-07-05 ENCOUNTER — TRANSCRIPTION ENCOUNTER (OUTPATIENT)
Age: 57
End: 2022-07-05

## 2022-07-06 ENCOUNTER — TRANSCRIPTION ENCOUNTER (OUTPATIENT)
Age: 57
End: 2022-07-06

## 2022-07-07 ENCOUNTER — TRANSCRIPTION ENCOUNTER (OUTPATIENT)
Age: 57
End: 2022-07-07

## 2022-07-12 ENCOUNTER — TRANSCRIPTION ENCOUNTER (OUTPATIENT)
Age: 57
End: 2022-07-12

## 2022-07-20 ENCOUNTER — APPOINTMENT (OUTPATIENT)
Dept: UROLOGY | Facility: CLINIC | Age: 57
End: 2022-07-20

## 2022-07-20 DIAGNOSIS — N20.0 CALCULUS OF KIDNEY: ICD-10-CM

## 2022-07-20 PROCEDURE — 99204 OFFICE O/P NEW MOD 45 MIN: CPT

## 2022-07-20 NOTE — PHYSICAL EXAM
[General Appearance - Well Developed] : well developed [General Appearance - Well Nourished] : well nourished [Normal Appearance] : normal appearance [Well Groomed] : well groomed [General Appearance - In No Acute Distress] : no acute distress [Edema] : no peripheral edema [Respiration, Rhythm And Depth] : normal respiratory rhythm and effort [Exaggerated Use Of Accessory Muscles For Inspiration] : no accessory muscle use [Abdomen Soft] : soft [Abdomen Tenderness] : non-tender [Abdomen Mass (___ Cm)] : no abdominal mass palpated [Abdomen Hernia] : no hernia was discovered [Costovertebral Angle Tenderness] : no ~M costovertebral angle tenderness [FreeTextEntry1] : pain located at right hip and reproduced with straight leg raise [Normal Station and Gait] : the gait and station were normal for the patient's age [] : no rash [No Focal Deficits] : no focal deficits [Oriented To Time, Place, And Person] : oriented to person, place, and time [Affect] : the affect was normal [Mood] : the mood was normal [Not Anxious] : not anxious [No Palpable Adenopathy] : no palpable adenopathy

## 2022-07-20 NOTE — HISTORY OF PRESENT ILLNESS
[FreeTextEntry1] : patient with right sided pain overlying right hip]\par no luts a bother\par admits to a lot of water intake but does admit to salt intake in excess at times\par BARBARA done and stones found \par here for f/u :

## 2022-07-20 NOTE — REVIEW OF SYSTEMS
[Dry Eyes] : dryness of the eyes [Chest Pain] : chest pain [Abdominal Pain] : abdominal pain [Heartburn] : heartburn [Joint Pain] : joint pain [Joint Swelling] : joint swelling [Limb Swelling] : limb swelling [Difficulty Walking] : difficulty walking [Muscle Weakness] : muscle weakness [Feelings Of Weakness] : feelings of weakness [Easy Bruising] : a tendency for easy bruising [Negative] : Psychiatric [FreeTextEntry3] : Sinus [FreeTextEntry2] : HBP

## 2022-07-20 NOTE — ASSESSMENT
[FreeTextEntry1] : patient with right sided pain overlying right hip]\par no luts a bother\par admits to a lot of water intake but does admit to salt intake in excess at times\par BARBARA done and stones found \par here for f/u :\par 1- discussed that BARBARA stones 5mm UP and MP passable but renal colic due to passing stones and with no evidence of hydro in the BARBARA unlikely \par 2- colic pain also wave like and not steady as she describes\par 3- advised to dec salt and cont annual f/u of renal stones , in addition to signs and sxs of passing stone\par 4- urine clear , creat normal at 0.73 and good emptying of bladder on sono \par 5- discussed right sided pain may be related to arthritis of the hip as worse in the AM and improves in the day as she is mobile

## 2022-07-28 ENCOUNTER — TRANSCRIPTION ENCOUNTER (OUTPATIENT)
Age: 57
End: 2022-07-28

## 2022-08-12 ENCOUNTER — NON-APPOINTMENT (OUTPATIENT)
Age: 57
End: 2022-08-12

## 2022-08-15 ENCOUNTER — TRANSCRIPTION ENCOUNTER (OUTPATIENT)
Age: 57
End: 2022-08-15

## 2022-08-16 ENCOUNTER — APPOINTMENT (OUTPATIENT)
Dept: INTERNAL MEDICINE | Facility: CLINIC | Age: 57
End: 2022-08-16

## 2022-08-19 ENCOUNTER — TRANSCRIPTION ENCOUNTER (OUTPATIENT)
Age: 57
End: 2022-08-19

## 2022-08-22 ENCOUNTER — APPOINTMENT (OUTPATIENT)
Dept: ORTHOPEDIC SURGERY | Facility: CLINIC | Age: 57
End: 2022-08-22

## 2022-08-22 VITALS
OXYGEN SATURATION: 98 % | HEART RATE: 61 BPM | WEIGHT: 146 LBS | DIASTOLIC BLOOD PRESSURE: 89 MMHG | HEIGHT: 66 IN | SYSTOLIC BLOOD PRESSURE: 172 MMHG | BODY MASS INDEX: 23.46 KG/M2

## 2022-08-22 PROCEDURE — 20553 NJX 1/MLT TRIGGER POINTS 3/>: CPT

## 2022-08-22 PROCEDURE — 99214 OFFICE O/P EST MOD 30 MIN: CPT | Mod: 25

## 2022-08-23 ENCOUNTER — LABORATORY RESULT (OUTPATIENT)
Age: 57
End: 2022-08-23

## 2022-08-23 ENCOUNTER — APPOINTMENT (OUTPATIENT)
Dept: RHEUMATOLOGY | Facility: CLINIC | Age: 57
End: 2022-08-23

## 2022-08-23 VITALS
OXYGEN SATURATION: 96 % | SYSTOLIC BLOOD PRESSURE: 142 MMHG | HEART RATE: 60 BPM | HEIGHT: 66 IN | WEIGHT: 136 LBS | BODY MASS INDEX: 21.86 KG/M2 | DIASTOLIC BLOOD PRESSURE: 87 MMHG | TEMPERATURE: 97.5 F

## 2022-08-23 DIAGNOSIS — Z79.899 OTHER LONG TERM (CURRENT) DRUG THERAPY: ICD-10-CM

## 2022-08-23 PROCEDURE — 99215 OFFICE O/P EST HI 40 MIN: CPT

## 2022-08-24 ENCOUNTER — TRANSCRIPTION ENCOUNTER (OUTPATIENT)
Age: 57
End: 2022-08-24

## 2022-08-24 LAB
25(OH)D3 SERPL-MCNC: 31.1 NG/ML
ALBUMIN SERPL ELPH-MCNC: 4.8 G/DL
ALP BLD-CCNC: 62 U/L
ALT SERPL-CCNC: 13 U/L
ANION GAP SERPL CALC-SCNC: 14 MMOL/L
AST SERPL-CCNC: 19 U/L
BASOPHILS # BLD AUTO: 0.02 K/UL
BASOPHILS NFR BLD AUTO: 0.3 %
BILIRUB SERPL-MCNC: 1.1 MG/DL
BUN SERPL-MCNC: 14 MG/DL
CALCIUM SERPL-MCNC: 9.9 MG/DL
CHLORIDE SERPL-SCNC: 100 MMOL/L
CK SERPL-CCNC: 176 U/L
CO2 SERPL-SCNC: 27 MMOL/L
CREAT SERPL-MCNC: 0.68 MG/DL
CRP SERPL-MCNC: <3 MG/L
EGFR: 100 ML/MIN/1.73M2
EOSINOPHIL # BLD AUTO: 0.01 K/UL
EOSINOPHIL NFR BLD AUTO: 0.2 %
ERYTHROCYTE [SEDIMENTATION RATE] IN BLOOD BY WESTERGREN METHOD: 39 MM/HR
GLUCOSE SERPL-MCNC: 113 MG/DL
HCT VFR BLD CALC: 42.4 %
HGB BLD-MCNC: 13.6 G/DL
IMM GRANULOCYTES NFR BLD AUTO: 0.3 %
LYMPHOCYTES # BLD AUTO: 1.65 K/UL
LYMPHOCYTES NFR BLD AUTO: 24.9 %
MAN DIFF?: NORMAL
MCHC RBC-ENTMCNC: 27.6 PG
MCHC RBC-ENTMCNC: 32.1 GM/DL
MCV RBC AUTO: 86 FL
MONOCYTES # BLD AUTO: 0.38 K/UL
MONOCYTES NFR BLD AUTO: 5.7 %
NEUTROPHILS # BLD AUTO: 4.55 K/UL
NEUTROPHILS NFR BLD AUTO: 68.6 %
PLATELET # BLD AUTO: NORMAL K/UL
POTASSIUM SERPL-SCNC: 3.7 MMOL/L
PROT SERPL-MCNC: 7.3 G/DL
RBC # BLD: 4.93 M/UL
RBC # FLD: 15.1 %
SODIUM SERPL-SCNC: 140 MMOL/L
WBC # FLD AUTO: 6.63 K/UL

## 2022-08-25 ENCOUNTER — APPOINTMENT (OUTPATIENT)
Dept: OBGYN | Facility: CLINIC | Age: 57
End: 2022-08-25

## 2022-08-25 VITALS
WEIGHT: 136 LBS | SYSTOLIC BLOOD PRESSURE: 152 MMHG | HEIGHT: 66 IN | HEART RATE: 57 BPM | BODY MASS INDEX: 21.86 KG/M2 | DIASTOLIC BLOOD PRESSURE: 93 MMHG

## 2022-08-25 PROCEDURE — 99396 PREV VISIT EST AGE 40-64: CPT

## 2022-09-03 ENCOUNTER — RESULT REVIEW (OUTPATIENT)
Age: 57
End: 2022-09-03

## 2022-09-03 ENCOUNTER — OUTPATIENT (OUTPATIENT)
Dept: OUTPATIENT SERVICES | Facility: HOSPITAL | Age: 57
LOS: 1 days | End: 2022-09-03
Payer: COMMERCIAL

## 2022-09-03 ENCOUNTER — APPOINTMENT (OUTPATIENT)
Dept: MRI IMAGING | Facility: CLINIC | Age: 57
End: 2022-09-03

## 2022-09-03 DIAGNOSIS — Z98.51 TUBAL LIGATION STATUS: Chronic | ICD-10-CM

## 2022-09-03 DIAGNOSIS — Z98.89 OTHER SPECIFIED POSTPROCEDURAL STATES: Chronic | ICD-10-CM

## 2022-09-03 DIAGNOSIS — Z00.00 ENCOUNTER FOR GENERAL ADULT MEDICAL EXAMINATION WITHOUT ABNORMAL FINDINGS: ICD-10-CM

## 2022-09-03 DIAGNOSIS — Z98.890 OTHER SPECIFIED POSTPROCEDURAL STATES: Chronic | ICD-10-CM

## 2022-09-03 PROCEDURE — 72141 MRI NECK SPINE W/O DYE: CPT

## 2022-09-03 PROCEDURE — 72141 MRI NECK SPINE W/O DYE: CPT | Mod: 26

## 2022-09-06 ENCOUNTER — NON-APPOINTMENT (OUTPATIENT)
Age: 57
End: 2022-09-06

## 2022-09-07 LAB
CYTOLOGY CVX/VAG DOC THIN PREP: NORMAL
HPV HIGH+LOW RISK DNA PNL CVX: NOT DETECTED

## 2022-09-14 ENCOUNTER — APPOINTMENT (OUTPATIENT)
Dept: ORTHOPEDIC SURGERY | Facility: CLINIC | Age: 57
End: 2022-09-14
Payer: COMMERCIAL

## 2022-09-14 PROCEDURE — ZZZZZ: CPT

## 2022-09-18 ENCOUNTER — RX RENEWAL (OUTPATIENT)
Age: 57
End: 2022-09-18

## 2022-09-22 ENCOUNTER — TRANSCRIPTION ENCOUNTER (OUTPATIENT)
Age: 57
End: 2022-09-22

## 2022-09-25 ENCOUNTER — NON-APPOINTMENT (OUTPATIENT)
Age: 57
End: 2022-09-25

## 2022-09-26 ENCOUNTER — TRANSCRIPTION ENCOUNTER (OUTPATIENT)
Age: 57
End: 2022-09-26

## 2022-09-27 ENCOUNTER — TRANSCRIPTION ENCOUNTER (OUTPATIENT)
Age: 57
End: 2022-09-27

## 2022-09-30 ENCOUNTER — APPOINTMENT (OUTPATIENT)
Dept: PHYSICAL MEDICINE AND REHAB | Facility: CLINIC | Age: 57
End: 2022-09-30

## 2022-09-30 VITALS
DIASTOLIC BLOOD PRESSURE: 67 MMHG | TEMPERATURE: 96.1 F | SYSTOLIC BLOOD PRESSURE: 132 MMHG | OXYGEN SATURATION: 98 % | HEART RATE: 82 BPM

## 2022-09-30 PROCEDURE — 99214 OFFICE O/P EST MOD 30 MIN: CPT

## 2022-09-30 PROCEDURE — 99204 OFFICE O/P NEW MOD 45 MIN: CPT

## 2022-09-30 RX ORDER — ADHESIVE TAPE 1.5"X360"
200 & 2 TAPE, NON-MEDICATED TOPICAL TWICE DAILY
Qty: 1 | Refills: 0 | Status: DISCONTINUED | COMMUNITY
Start: 2022-05-16 | End: 2022-09-30

## 2022-09-30 RX ORDER — CLOTRIMAZOLE 10 MG/G
1 CREAM TOPICAL
Qty: 1 | Refills: 0 | Status: DISCONTINUED | COMMUNITY
Start: 2021-07-27 | End: 2022-09-30

## 2022-09-30 NOTE — DATA REVIEWED
[MRI] : MRI [FreeTextEntry1] : \par  MR Cervical Spine No Cont             Final\par \par No Documents Attached\par \par \par \par \par   EXAM: [id number] - MR SPINE CERVICAL  - ORDERED BY: VENANCIO PANTOJA\par \par \par PROCEDURE DATE:  09/03/2022\par \par \par \par INTERPRETATION:  CERVICAL SPINE MRI\par \par CLINICAL INFORMATION: Sharp neck pain with radiculopathy to left arm for one year.\par TECHNIQUE: Multiplanar, multisequence MRI was obtained of the cervical spine.\par COMPARISON: None available.\par \par FINDINGS:\par \par ALIGNMENT: The cervical spine is straightened. Trace retrolisthesis of C3 on C4, C4 on C5 and of C6 on C7. Trace anterolisthesis of C5 on C6.\par MARROW: Endplate cystic change at C4-C5 that is degenerative in nature.\par CORD: No signal abnormality\par IMAGED BRAIN: Within normal limits\par PERIPHERAL/NECK SOFT TISSUES: Within normal limits\par \par DISC LEVEL EVALUATION:\par \par C2/C3: No spinal canal narrowing or neuroforaminal stenosis.\par C3/C4: Osseous ridging and minimal right-sided disc protrusion resulting in minimal right-sided neuroforaminal stenosis. Mild bilateral facet arthropathy.\par C4/C5: Osseous ridging and disc bulge resulting in mild spinal canal narrowing with effacement of the ventral thecal sac and mild right neuroforaminal stenosis. Uncovertebral and mild bilateral facet arthropathy.\par C5/C6: Osseous ridging and bulging resulting in mild spinal canal narrowing with effacement of the ventral thecal sac with disc contacting but not compressing the ventral aspect of the cord. Moderate bilateral neuroforaminal stenosis.\par C6/C7: Mild to moderate disc bulging and osteophyte formation resulting in moderate to severe bilateral foraminal narrowing. Disc indents the ventral thecal sac without contact upon the cord.\par C7/T1: Minimal disc bulging and minimal facet arthropathy.\par \par IMPRESSION:\par Multilevel lumbar spondylosis that is most advanced from C4 through C7 with central canal and foraminal narrowing as detailed above.\par \par --- End of Report ---\par \par \par \par \par \par AGAPITO LEWIS MD; Resident Radiologist\par This document has been electronically signed.\par MADIHA CALZADA MD; Attending Radiologist\par This document has been electronically signed. Sep 14 2022 12:57AM\par \par  \par \par  Ordered by: VENANCIO PANTOJA       Collected/Examined: 03Sep2022 08:44AM       \par Verified by: VENANCIO PANTOJA 14Sep2022 07:02PM       \par  Result Communication: No patient communication needed at this time;\par Stage: Final       \par  Performed at: [facility]       Resulted: 13Sep2022 08:52AM       Last Updated: 14Sep2022 07:02PM       Accession: [id number]

## 2022-09-30 NOTE — ASSESSMENT
[FreeTextEntry1] : 57 year old female with neck pain and cervical radicular pain secondary to neural foraminal stenosis.  Given the nature of the patient's complaints and lack of significant improvement following more conservative measures, we did discuss cervical epidural steroid injection.  Risks, benefits, and expectations of the procedure were reviewed.  The patient was provided with an educational pamphlet outlining the details of the procedure so that he/she may have the ability to review the information prior to proceeding.  The patient has agreed to proceed and will follow up with me for the procedure.\par

## 2022-09-30 NOTE — CONSULT LETTER
[Dear  ___] : Dear ~JOSE ALEJANDRO, [Consult Letter:] : I had the pleasure of evaluating your patient, [unfilled]. [Please see my note below.] : Please see my note below. [Consult Closing:] : Thank you very much for allowing me to participate in the care of this patient.  If you have any questions, please do not hesitate to contact me. [Sincerely,] : Sincerely, [FreeTextEntry2] : Connie Dubon MD\par [address]\[place] [address] [FreeTextEntry3] : Anai

## 2022-09-30 NOTE — HISTORY OF PRESENT ILLNESS
[Neck] : neck [___ yrs] : [unfilled] year(s) ago [5] : a current pain level of 5/10 [10] : a maximum pain level of 10/10 [Sharp] : sharp [Burning] : burning [Left] : left [Hand] : hand [Tingling] : tingling [Turning Head] : turning head [Insomnia] : insomnia [PT] : PT [Medications] : medications [FreeTextEntry2] : left arm [FreeTextEntry4] : cannot identify [FreeTextEntry6] : anti-inflammatories

## 2022-10-07 ENCOUNTER — TRANSCRIPTION ENCOUNTER (OUTPATIENT)
Age: 57
End: 2022-10-07

## 2022-10-11 ENCOUNTER — TRANSCRIPTION ENCOUNTER (OUTPATIENT)
Age: 57
End: 2022-10-11

## 2022-10-11 LAB — SARS-COV-2 N GENE NPH QL NAA+PROBE: NOT DETECTED

## 2022-10-12 ENCOUNTER — APPOINTMENT (OUTPATIENT)
Dept: UROLOGY | Facility: CLINIC | Age: 57
End: 2022-10-12

## 2022-10-13 ENCOUNTER — OUTPATIENT (OUTPATIENT)
Dept: OUTPATIENT SERVICES | Facility: HOSPITAL | Age: 57
LOS: 1 days | End: 2022-10-13
Payer: COMMERCIAL

## 2022-10-13 ENCOUNTER — APPOINTMENT (OUTPATIENT)
Dept: PHYSICAL MEDICINE AND REHAB | Facility: CLINIC | Age: 57
End: 2022-10-13

## 2022-10-13 DIAGNOSIS — M54.12 RADICULOPATHY, CERVICAL REGION: ICD-10-CM

## 2022-10-13 DIAGNOSIS — Z98.890 OTHER SPECIFIED POSTPROCEDURAL STATES: Chronic | ICD-10-CM

## 2022-10-13 DIAGNOSIS — Z98.89 OTHER SPECIFIED POSTPROCEDURAL STATES: Chronic | ICD-10-CM

## 2022-10-13 DIAGNOSIS — Z98.51 TUBAL LIGATION STATUS: Chronic | ICD-10-CM

## 2022-10-13 PROCEDURE — 62321 NJX INTERLAMINAR CRV/THRC: CPT

## 2022-10-26 ENCOUNTER — APPOINTMENT (OUTPATIENT)
Dept: PHYSICAL MEDICINE AND REHAB | Facility: CLINIC | Age: 57
End: 2022-10-26

## 2022-10-26 DIAGNOSIS — M54.2 CERVICALGIA: ICD-10-CM

## 2022-10-26 DIAGNOSIS — M50.30 OTHER CERVICAL DISC DEGENERATION, UNSPECIFIED CERVICAL REGION: ICD-10-CM

## 2022-10-26 DIAGNOSIS — M48.02 SPINAL STENOSIS, CERVICAL REGION: ICD-10-CM

## 2022-10-26 PROCEDURE — 99213 OFFICE O/P EST LOW 20 MIN: CPT

## 2022-10-26 NOTE — HISTORY OF PRESENT ILLNESS
[FreeTextEntry1] : Patient returns after the first RAHEEM a few weeks ago.  The patient reports 65-70% improvement in the symptoms.  The patient is pleased with the results and returns for a follow up visit today.\par \par

## 2022-10-26 NOTE — ASSESSMENT
[FreeTextEntry1] : 57 year old female with neck pain and cervical radicular pain now moderately improved following her first RAHEEM.  She is pleased with the results and will follow up with me at the earliest sign that her pain worsens for further injection therapy as necessary.

## 2022-11-10 ENCOUNTER — LABORATORY RESULT (OUTPATIENT)
Age: 57
End: 2022-11-10

## 2022-11-10 ENCOUNTER — APPOINTMENT (OUTPATIENT)
Dept: RHEUMATOLOGY | Facility: CLINIC | Age: 57
End: 2022-11-10

## 2022-11-10 VITALS
RESPIRATION RATE: 16 BRPM | HEART RATE: 64 BPM | OXYGEN SATURATION: 95 % | TEMPERATURE: 97.6 F | DIASTOLIC BLOOD PRESSURE: 83 MMHG | WEIGHT: 140 LBS | BODY MASS INDEX: 22.5 KG/M2 | HEIGHT: 66 IN | SYSTOLIC BLOOD PRESSURE: 129 MMHG

## 2022-11-10 PROCEDURE — 99215 OFFICE O/P EST HI 40 MIN: CPT

## 2022-11-10 NOTE — DATA REVIEWED
[FreeTextEntry1] : Laboratory and radiology studies that were personally reviewed at today's visit are summarized in above and below:\par MRI neck (9-2022):  multilevel spondylosis \par PMR note - RAHEEM with relief. \par dexa 4-2022:  Normal \par ortho note (5-16-22):  reactive cervical myofascial pain - trigger point injections \par 7.20.16:  RF=50, ANJUM = 1:80, AJM=479, CK 1793\par ortho note (8-22-22) cervical myofascial pain tx with trigger point injections.

## 2022-11-10 NOTE — REVIEW OF SYSTEMS
[Fever] : no fever [Chills] : no chills [Eye Pain] : no eye pain [Chest Pain] : no chest pain [Shortness Of Breath] : no shortness of breath [Cough] : no cough [Abdominal Pain] : no abdominal pain [Vomiting] : no vomiting [Diarrhea] : no diarrhea [Joint Pain] : no joint pain [Joint Swelling] : no joint swelling [Dizziness] : no dizziness [Anxiety] : no anxiety [Muscle Weakness] : no muscle weakness [Swollen Glands] : no swollen glands

## 2022-11-10 NOTE — REASON FOR VISIT
[Follow-Up: _____] : a [unfilled] follow-up visit [FreeTextEntry1] : necrotizing myopathy + SRP, elevated CK (2000s) + RF + RNP,

## 2022-11-10 NOTE — PHYSICAL EXAM
[General Appearance - Alert] : alert [General Appearance - In No Acute Distress] : in no acute distress [Sclera] : the sclera and conjunctiva were normal [PERRL With Normal Accommodation] : pupils were equal in size, round, and reactive to light [Extraocular Movements] : extraocular movements were intact [Outer Ear] : the ears and nose were normal in appearance [Oropharynx] : the oropharynx was normal [Neck Appearance] : the appearance of the neck was normal [Neck Cervical Mass (___cm)] : no neck mass was observed [Jugular Venous Distention Increased] : there was no jugular-venous distention [Thyroid Diffuse Enlargement] : the thyroid was not enlarged [Thyroid Nodule] : there were no palpable thyroid nodules [] : no rash [FreeTextEntry1] : No signs of synovitis, limitation of ROM or deformity of any joint.  5/5 UE and LE b/ld/p

## 2022-11-10 NOTE — ASSESSMENT
[FreeTextEntry1] : 57 year old female with 6+ years of weakness and muscle tenderness and aching. \par positive anti SRP, + RF, + RNP.  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 \par Rtx 4/23/19 and 5/16/19 \par Now on MMF 3 g daily  (had return of pain with lowering dose and now better on full dose) \par \par #necrotizing myopathy\par #mononeuropathy of UE b/l\par #secondary fibromyalgia - poor sleep hygiene, multiple tender points, anxiety\par #structural back disease - better with injections \par # GERD\par # Fatigue - NOS\par # Intermittent chest pain with some sob with exertion\par \par -> Will check laboratory tests to look for markers of disease activity and also to assess for medication toxicity.\par -> continue gabapentin\par ->  continue CellCept 3g daily \par ->  given new fatigue, intermittent atypical chest pain and exertional SOB concern for cardiac disease or lung insolvent of myopathy \par ->  March 2023 cards workup reviewed, needs to follow up cardiology \par ->  check CT chest (ordered) \par ->  Follow up Pulm \par ->  GERD - change gaviscon to omeprazole and follow up GI as planned \par -> discussed with lynnnet accelration of care in the event that symptoms worsen \par ->  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 \par \par \par \par HCM \par Prevnar in dec 2019 and pneumovax in march 2020\par Moderna x 2  (2-2021) and booster in 8-2021  and to get booster in fall 2022 (bivalent)\par 10-: flu shot  [facility] (deferred today because her  has a cold) \par \par Bone Health\par DeXA 4-2022 - normal \par Continue calcium total 1200 mg\par \par HTN \par hasn't taken medications yet today - will take when she gets back to her car\par \par More than 50% of the encounter was spent counselling the patient on differential, workup, disease course, and treatment/management.   Education was provided to the patient during this encounter. All questions and concerns were answered and addressed in detail. The patient verbalized understanding and agreed to plan\par \par Patient has been instructed to call for an earlier appointment if new symptoms develop \par Patient has been instructed to make a follow up appointment 8 weeks. \par \par

## 2022-11-11 ENCOUNTER — TRANSCRIPTION ENCOUNTER (OUTPATIENT)
Age: 57
End: 2022-11-11

## 2022-11-11 LAB
25(OH)D3 SERPL-MCNC: 34.6 NG/ML
ALBUMIN SERPL ELPH-MCNC: 4.6 G/DL
ALP BLD-CCNC: 59 U/L
ALT SERPL-CCNC: 10 U/L
ANION GAP SERPL CALC-SCNC: 12 MMOL/L
APPEARANCE: CLEAR
AST SERPL-CCNC: 16 U/L
BACTERIA: NEGATIVE
BASOPHILS # BLD AUTO: 0.02 K/UL
BASOPHILS NFR BLD AUTO: 0.3 %
BILIRUB SERPL-MCNC: 0.6 MG/DL
BILIRUBIN URINE: NEGATIVE
BLOOD URINE: NEGATIVE
BUN SERPL-MCNC: 11 MG/DL
C3 SERPL-MCNC: 102 MG/DL
C4 SERPL-MCNC: 38 MG/DL
CALCIUM SERPL-MCNC: 9.9 MG/DL
CHLORIDE SERPL-SCNC: 105 MMOL/L
CK SERPL-CCNC: 108 U/L
CO2 SERPL-SCNC: 27 MMOL/L
COLOR: COLORLESS
CREAT SERPL-MCNC: 0.67 MG/DL
CREAT SPEC-SCNC: 41 MG/DL
CREAT/PROT UR: NORMAL RATIO
CRP SERPL-MCNC: <3 MG/L
EGFR: 101 ML/MIN/1.73M2
ENA SS-A AB SER IA-ACNC: 0.9 AL
ENA SS-B AB SER IA-ACNC: <0.2 AL
EOSINOPHIL # BLD AUTO: 0.02 K/UL
EOSINOPHIL NFR BLD AUTO: 0.3 %
ERYTHROCYTE [SEDIMENTATION RATE] IN BLOOD BY WESTERGREN METHOD: 32 MM/HR
GLUCOSE QUALITATIVE U: NEGATIVE
GLUCOSE SERPL-MCNC: 101 MG/DL
HCT VFR BLD CALC: 43.1 %
HGB BLD-MCNC: 13.8 G/DL
HYALINE CASTS: 0 /LPF
IMM GRANULOCYTES NFR BLD AUTO: 0.2 %
KETONES URINE: NEGATIVE
LEUKOCYTE ESTERASE URINE: NEGATIVE
LYMPHOCYTES # BLD AUTO: 2.07 K/UL
LYMPHOCYTES NFR BLD AUTO: 33.3 %
MAN DIFF?: NORMAL
MCHC RBC-ENTMCNC: 27.4 PG
MCHC RBC-ENTMCNC: 32 GM/DL
MCV RBC AUTO: 85.7 FL
MICROSCOPIC-UA: NORMAL
MONOCYTES # BLD AUTO: 0.37 K/UL
MONOCYTES NFR BLD AUTO: 6 %
NEUTROPHILS # BLD AUTO: 3.72 K/UL
NEUTROPHILS NFR BLD AUTO: 59.9 %
NITRITE URINE: NEGATIVE
PH URINE: 5.5
PLATELET # BLD AUTO: NORMAL K/UL
POTASSIUM SERPL-SCNC: 4.3 MMOL/L
PROT SERPL-MCNC: 7.2 G/DL
PROT UR-MCNC: <4 MG/DL
PROTEIN URINE: NEGATIVE
RBC # BLD: 5.03 M/UL
RBC # FLD: 14.6 %
RED BLOOD CELLS URINE: 0 /HPF
SODIUM SERPL-SCNC: 143 MMOL/L
SPECIFIC GRAVITY URINE: 1.01
SQUAMOUS EPITHELIAL CELLS: 1 /HPF
THYROGLOB AB SERPL-ACNC: <20 IU/ML
THYROPEROXIDASE AB SERPL IA-ACNC: <10 IU/ML
TSH SERPL-ACNC: 1.88 UIU/ML
UROBILINOGEN URINE: NORMAL
WBC # FLD AUTO: 6.21 K/UL
WHITE BLOOD CELLS URINE: 0 /HPF

## 2022-11-12 ENCOUNTER — NON-APPOINTMENT (OUTPATIENT)
Age: 57
End: 2022-11-12

## 2022-11-13 ENCOUNTER — TRANSCRIPTION ENCOUNTER (OUTPATIENT)
Age: 57
End: 2022-11-13

## 2022-11-18 ENCOUNTER — OUTPATIENT (OUTPATIENT)
Dept: OUTPATIENT SERVICES | Facility: HOSPITAL | Age: 57
LOS: 1 days | End: 2022-11-18
Payer: COMMERCIAL

## 2022-11-18 ENCOUNTER — APPOINTMENT (OUTPATIENT)
Dept: CT IMAGING | Facility: CLINIC | Age: 57
End: 2022-11-18

## 2022-11-18 DIAGNOSIS — R07.89 OTHER CHEST PAIN: ICD-10-CM

## 2022-11-18 DIAGNOSIS — Z98.89 OTHER SPECIFIED POSTPROCEDURAL STATES: Chronic | ICD-10-CM

## 2022-11-18 DIAGNOSIS — Z98.890 OTHER SPECIFIED POSTPROCEDURAL STATES: Chronic | ICD-10-CM

## 2022-11-18 DIAGNOSIS — Z00.8 ENCOUNTER FOR OTHER GENERAL EXAMINATION: ICD-10-CM

## 2022-11-18 DIAGNOSIS — Z98.51 TUBAL LIGATION STATUS: Chronic | ICD-10-CM

## 2022-11-18 PROCEDURE — 71250 CT THORAX DX C-: CPT | Mod: 26

## 2022-11-18 PROCEDURE — 71250 CT THORAX DX C-: CPT

## 2022-11-21 ENCOUNTER — TRANSCRIPTION ENCOUNTER (OUTPATIENT)
Age: 57
End: 2022-11-21

## 2022-11-23 ENCOUNTER — NON-APPOINTMENT (OUTPATIENT)
Age: 57
End: 2022-11-23

## 2022-11-28 NOTE — REASON FOR VISIT
[Initial Consultation] : an initial consultation for [Other: _____] : [unfilled] [FreeTextEntry2] : Bilateral breast pain, left > right

## 2022-11-28 NOTE — REVIEW OF SYSTEMS
[Negative] : Integumentary [FreeTextEntry5] : Hypertension [FreeTextEntry8] : IBS [de-identified] : Necrotizing myopathy [de-identified] : Neuropathy [de-identified] : Anxiety/depression [de-identified] : Mastopathy [FreeTextEntry1] : Receives gammaglobulin infections

## 2022-11-28 NOTE — ASSESSMENT
[FreeTextEntry1] : Clinically, her bilateral breast pain and tenderness appears to be fibrocystic and fibroglandular in nature.\par \par \par August 2021:\par Bilateral mammogram and sonogram at : [facility].\par Discs return to her.\par \par Her breast cancer risk score is 13.0%.\par \par I suggested repeat mammography and breast ultrasounds (bilateral) presently since it has been more than 6 months since she had her most recent imaging.\par Prescriptions provided for .\par \par I have asked her to call me a week after the imaging to discuss the results.\par \par I have made some suggestions for conservative management of her mastalgia.\par \par Reviewed in detail, all questions answered.\par \par I have asked to see her in 3 to 4 months, sooner if needed.\par \par Note dictated to her referring physician.\par \par \par 4/1/2022:\par Bilateral mammogram and sonogram at : [facility].\par Bilateral breast ultrasound is recommended at a 6-month interval, October 2022.\par Prescriptions mailed 4/4/2022.\par \par \par 11/20/2022:\par Bilateral breast ultrasounds at : [facility].\par Bilateral breast imaging due April/May 2023.\par Prescription mailed 11/28/2022

## 2022-11-28 NOTE — PHYSICAL EXAM
[Normal] : supple, no neck mass and thyroid not enlarged [Normal Neck Lymph Nodes] : normal neck lymph nodes  [Normal Supraclavicular Lymph Nodes] : normal supraclavicular lymph nodes [Normal Axillary Lymph Nodes] : normal axillary lymph nodes [Normal] : normal appearance, no rash, nodules, vesicles, ulcers, erythema [de-identified] : Groins not examined [de-identified] : Below

## 2022-11-28 NOTE — HISTORY OF PRESENT ILLNESS
[de-identified] : 57-year-old lady referred by her internist Dr. Indiana RODRIGUES for the evaluation and management of BREAST PAIN (bilateral, in the tail of each breast).\par \par Left >right.\par \par No other signs or symptoms related to either breast.\par \par Onset of pain is ~end of 2022.\par No antecedent event/strain/injury.\par Initially intermittent without provocative or palliative factors.\par Now fairly constant.\par \par No other signs or symptoms related to either breast.\par \par \par Possible contributing factors:\par <2 caffeinated beverages daily.\par No nicotine.\par No exogenous hormones.\par \par \par 2021:\par Bilateral mammogram and breast ultrasounds at : [facility].\par \par \par + Prior personal history:\par Bilateral breast biopsies:\par ~Late : Excisional right breast biopsy: Benign.\par ~: Excisional left breast biopsy: Benign.\par Both procedures were at [facility].\par \par No other procedures related to either breast.\par \par No personal history of malignancy..\par \par \par No relatives with breast cancer.\par No relatives with ovarian cancer.\par \par Not Ashkenazi.\par \par The only relative with a history of malignancy:\par Maternal grandmother: Lung cancer.\par \par \par Menarche at 12.\par  2, para 2, first at 22.\par Natural menopause at 49.\par No HRT.\par \par \par Breast cancer risk score =13.0%.\par \par \par PMD: Dr. Indiana RODRIGUES.\par \par No pacemaker or defibrillator.\par No anticoagulants.\par \par +DIABETES.\par Treated with Metformin.\par She does not see an endocrinologist.\par \par + Hypertension.\par She takes amlodipine and hydrochlorothiazide.\par Cardiology: Dr. Shaun ELENA\par \par + Peripheral neuropathy.\par + Necrotizing myopathy\par She takes gabapentin and diclofenac.\par Rheumatology: Dr. Lakeisha DUNCAN\par She also takes CellCept\par \par GERD for which she takes omeprazole.\par \par + History of IBS..\par \par GI: Dr. Prosper ABRAHAM.\par \par \par + Depression/anxiety.\par She takes daily Klonopin.\par \par \par GYN: Dr. Rosa CASTILLO.\par 2021 visit was unremarkable.\par \par \par Colonoscopy in 2018 with Dr. Prosper Abraham was unremarkable.

## 2022-11-29 ENCOUNTER — APPOINTMENT (OUTPATIENT)
Dept: PULMONOLOGY | Facility: CLINIC | Age: 57
End: 2022-11-29

## 2022-11-29 VITALS
SYSTOLIC BLOOD PRESSURE: 138 MMHG | HEIGHT: 66 IN | TEMPERATURE: 97.3 F | HEART RATE: 75 BPM | WEIGHT: 140 LBS | OXYGEN SATURATION: 98 % | BODY MASS INDEX: 22.5 KG/M2 | RESPIRATION RATE: 15 BRPM | DIASTOLIC BLOOD PRESSURE: 84 MMHG

## 2022-11-29 PROCEDURE — 99203 OFFICE O/P NEW LOW 30 MIN: CPT

## 2022-11-29 NOTE — REVIEW OF SYSTEMS
[SOB on Exertion] : sob on exertion [GERD] : gerd [Myalgias] : myalgias [Negative] : Endocrine [TextBox_44] : Hypertension

## 2022-11-29 NOTE — ASSESSMENT
[FreeTextEntry1] : The patient's chest CT demonstrates a mild degree of emphysema and areas of probable mucus accumulation (tree in bud) in the middle lobe, lingula and lower lobes. There is no specific evidence for interstitial lung disease. I suggested a followup lung function study to see if there has been any progression of the impairment that we have seen 3 years ago. I will followup with her after she has lung function studies.

## 2022-11-29 NOTE — HISTORY OF PRESENT ILLNESS
[TextBox_4] : 57-year-old female whom I had seen more than 3 years ago because of dyspnea on exertion. The patient has underlying necrotizing myopathy for which she is being treated with mycophenolate. At the time I had seen her, her lung functions were normal except for a moderate to severe reduction in diffusing capacity with a normal chest x-ray. An exercise study was done and ordered to delineate further the cause of her dyspnea and it was felt that she had an element of deconditioning. She has approximately a 20-25-pack-year history of cigarette smoking which could have been responsible for the low diffusing capacity. Her dyspnea complaints seem about the same as they were 3 years ago. She does report occasional episodic dyspnea on exertion. She currently has a cough that is productive which he attributes to a recent respiratory infection. She denies any fever but admits to some vague right-sided chest discomfort

## 2022-12-07 ENCOUNTER — APPOINTMENT (OUTPATIENT)
Dept: CARDIOLOGY | Facility: CLINIC | Age: 57
End: 2022-12-07

## 2022-12-07 ENCOUNTER — NON-APPOINTMENT (OUTPATIENT)
Age: 57
End: 2022-12-07

## 2022-12-07 VITALS
SYSTOLIC BLOOD PRESSURE: 108 MMHG | OXYGEN SATURATION: 98 % | HEART RATE: 81 BPM | BODY MASS INDEX: 22.6 KG/M2 | TEMPERATURE: 98.8 F | HEIGHT: 66 IN | DIASTOLIC BLOOD PRESSURE: 73 MMHG

## 2022-12-07 VITALS — BODY MASS INDEX: 22.6 KG/M2 | WEIGHT: 140 LBS

## 2022-12-07 PROCEDURE — 99214 OFFICE O/P EST MOD 30 MIN: CPT | Mod: 25

## 2022-12-07 PROCEDURE — 93000 ELECTROCARDIOGRAM COMPLETE: CPT

## 2022-12-20 ENCOUNTER — TRANSCRIPTION ENCOUNTER (OUTPATIENT)
Age: 57
End: 2022-12-20

## 2022-12-20 ENCOUNTER — APPOINTMENT (OUTPATIENT)
Dept: CARDIOLOGY | Facility: CLINIC | Age: 57
End: 2022-12-20

## 2022-12-20 VITALS — DIASTOLIC BLOOD PRESSURE: 75 MMHG | SYSTOLIC BLOOD PRESSURE: 116 MMHG

## 2022-12-20 PROCEDURE — 99213 OFFICE O/P EST LOW 20 MIN: CPT | Mod: 95

## 2022-12-20 RX ORDER — HYDROCHLOROTHIAZIDE 12.5 MG/1
12.5 TABLET ORAL
Qty: 90 | Refills: 1 | Status: DISCONTINUED | COMMUNITY
Start: 2018-08-20 | End: 2022-12-20

## 2022-12-28 ENCOUNTER — APPOINTMENT (OUTPATIENT)
Dept: PULMONOLOGY | Facility: CLINIC | Age: 57
End: 2022-12-28

## 2022-12-28 ENCOUNTER — TRANSCRIPTION ENCOUNTER (OUTPATIENT)
Age: 57
End: 2022-12-28

## 2022-12-28 VITALS
DIASTOLIC BLOOD PRESSURE: 85 MMHG | HEIGHT: 66 IN | OXYGEN SATURATION: 99 % | BODY MASS INDEX: 22.98 KG/M2 | TEMPERATURE: 97.7 F | HEART RATE: 62 BPM | SYSTOLIC BLOOD PRESSURE: 143 MMHG | WEIGHT: 143 LBS

## 2022-12-28 PROCEDURE — ZZZZZ: CPT

## 2022-12-28 PROCEDURE — 94010 BREATHING CAPACITY TEST: CPT

## 2022-12-28 PROCEDURE — 99213 OFFICE O/P EST LOW 20 MIN: CPT | Mod: 25

## 2022-12-28 PROCEDURE — 94726 PLETHYSMOGRAPHY LUNG VOLUMES: CPT

## 2022-12-28 PROCEDURE — 94729 DIFFUSING CAPACITY: CPT

## 2022-12-28 NOTE — PHYSICAL EXAM
[No Acute Distress] : no acute distress [Well Nourished] : well nourished [Normal Oropharynx] : normal oropharynx [Erythema] : erythema [Normal Appearance] : normal appearance [Supple] : supple [Normal Rate/Rhythm] : normal rate/rhythm [Normal S1, S2] : normal s1, s2 [No Resp Distress] : no resp distress [Clear to Auscultation Bilaterally] : clear to auscultation bilaterally [No Clubbing] : no clubbing [No Edema] : no edema [Normal Color/ Pigmentation] : normal color/ pigmentation [No Focal Deficits] : no focal deficits [No Motor Deficits] : no motor deficits [Oriented x3] : oriented x3 [Normal Affect] : normal affect

## 2022-12-28 NOTE — HISTORY OF PRESENT ILLNESS
[TextBox_4] : 57-year-old female, former smoker (20-25pack year) and hx of of necrotizing myopathy presents for follow up for dyspnea on exertion.\par \par She was evaluated 1 month ago (prior to that 3 years ago) at which time her symptoms were stable. PFTS were WNL aside from decreased DLCO back in 2018. She underwent a CPET which showed  and element of decondition. Her most recent CT chest shows mild emphysema as well as some tree in bud opacities suggestive of mucous plugging.  She presents today for follow up of her PFTs. \par \par Patient reports overall she feels better since her last visit. She reports only mild dyspnea on exertion. She was also experiencing some lightheadedness and was told to discontinue her HCTZ by her cardiologist, with resolution of her symptoms. \par \par

## 2022-12-28 NOTE — REVIEW OF SYSTEMS
[Dyspnea] : dyspnea [Fever] : no fever [Chills] : no chills [Cough] : no cough [Sputum] : no sputum [Rash] : no rash [Headache] : no headache [Dizziness] : no dizziness

## 2022-12-28 NOTE — DISCUSSION/SUMMARY
[FreeTextEntry1] : 57F (former smoker 20-25pack year), necrotizing myopathy presents for follow up of dyspnea on exertion.\par \par #ARAGON:\par - reports improvement from last month\par - CT chest with mild emphysema and tree in bud opacities consistent with some mucous plugging; no  CT evidence of interstitial lung disease\par - PFTS show slight reduction in volumes but still normal; DLCO imporved\par - patient able to exercise again and increase exercise tolerate\par - no bronchospasms on exam or history of wheezing \par \par Patient with stable PFTs and reports symptomatic improvement since her last visit. Follow up in 1  year to repeat CT chest.\par \par Discussed with Dr. Mills.

## 2023-01-10 ENCOUNTER — APPOINTMENT (OUTPATIENT)
Dept: RHEUMATOLOGY | Facility: CLINIC | Age: 58
End: 2023-01-10
Payer: COMMERCIAL

## 2023-01-10 VITALS
WEIGHT: 143 LBS | BODY MASS INDEX: 22.98 KG/M2 | OXYGEN SATURATION: 98 % | TEMPERATURE: 97.7 F | DIASTOLIC BLOOD PRESSURE: 83 MMHG | HEART RATE: 58 BPM | HEIGHT: 66 IN | SYSTOLIC BLOOD PRESSURE: 141 MMHG

## 2023-01-10 DIAGNOSIS — M25.50 PAIN IN UNSPECIFIED JOINT: ICD-10-CM

## 2023-01-10 PROCEDURE — 90686 IIV4 VACC NO PRSV 0.5 ML IM: CPT

## 2023-01-10 PROCEDURE — G0008: CPT

## 2023-01-10 PROCEDURE — 99215 OFFICE O/P EST HI 40 MIN: CPT | Mod: 25

## 2023-01-10 NOTE — DATA REVIEWED
[FreeTextEntry1] : Laboratory and radiology studies that were personally reviewed at today's visit are summarized in above and below:\par Pulm note (12-28-22)  stable pf,t symtomatic improvement in ARAGON, no ILD On CT chest, DLCO improved on PFT.  \par Cards (12-20-22):  improved BP with change in meds. \par MRI neck (9-2022):  multilevel spondylosis \par PMR note - RAHEEM with relief. \par dexa 4-2022:  Normal \par ortho note (5-16-22):  reactive cervical myofascial pain - trigger point injections \par 7.20.16:  RF=50, ANJUM = 1:80, PVJ=400, CK 1793\par ortho note (8-22-22) cervical myofascial pain tx with trigger point injections.

## 2023-01-10 NOTE — PHYSICAL EXAM
[General Appearance - Alert] : alert [General Appearance - In No Acute Distress] : in no acute distress [Sclera] : the sclera and conjunctiva were normal [PERRL With Normal Accommodation] : pupils were equal in size, round, and reactive to light [Extraocular Movements] : extraocular movements were intact [Outer Ear] : the ears and nose were normal in appearance [Oropharynx] : the oropharynx was normal [Neck Appearance] : the appearance of the neck was normal [Neck Cervical Mass (___cm)] : no neck mass was observed [Jugular Venous Distention Increased] : there was no jugular-venous distention [Thyroid Diffuse Enlargement] : the thyroid was not enlarged [Thyroid Nodule] : there were no palpable thyroid nodules [] : no rash [Skin Color & Pigmentation] : normal skin color and pigmentation [Oriented To Time, Place, And Person] : oriented to person, place, and time [Impaired Insight] : insight and judgment were intact [FreeTextEntry1] : 5/5 (RUE-prox,distal, LUE - distal), RLE (prox,distal) and LLE (distal), 4/5 LUE (prox) and 3+/5 (LLE Prox)

## 2023-01-10 NOTE — ASSESSMENT
[FreeTextEntry1] : 57 year old female with 6+ years of weakness and muscle tenderness and aching. \par positive anti SRP, + RF, + RNP.  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 \par Rtx 4/23/19 and 5/16/19 \par Now on MMF 3 g daily  (had return of pain with lowering dose and now better on full dose) \par \par #necrotizing myopathy\par #mononeuropathy of UE b/l\par #secondary fibromyalgia - poor sleep hygiene, multiple tender points, anxiety\par #structural back disease - better with injections \par # GERD\par # Fatigue - NOS\par # TODAY with flare of achiness and joint pain \par \par -> Will check laboratory tests to look for markers of disease activity and also to assess for medication toxicity.\par -> continue gabapentin\par ->  continue CellCept 3g daily \par ->  Steroid taper for 30 days and re eval in 6-8 weeks \par ->  check CT chest (ordered) \par ->  Follow up Pulm cards and  notes reviewed \par ->  GERD - continue gaviscon to omeprazole and follow up GI as planned \par -> HCM labs drawn at patient request \par \par HCM \par Prevnar in dec 2019 and pneumovax in march 2020\par Moderna x 2  (2-2021) and booster in 8-2021  and to get booster in fall 2022 (bivalent)\par Flu shot today  \par \par Bone Health\par DeXA 4-2022 - normal \par Continue calcium total 1200 mg\par \par \par More than 50% of the encounter was spent counselling the patient on differential, workup, disease course, and treatment/management.   Education was provided to the patient during this encounter. All questions and concerns were answered and addressed in detail. The patient verbalized understanding and agreed to plan\par \par Patient has been instructed to call for an earlier appointment if new symptoms develop \par Patient has been instructed to make a follow up appointment 8 weeks. \par \par

## 2023-01-12 ENCOUNTER — TRANSCRIPTION ENCOUNTER (OUTPATIENT)
Age: 58
End: 2023-01-12

## 2023-01-12 LAB
ALBUMIN SERPL ELPH-MCNC: 4.7 G/DL
ALP BLD-CCNC: 67 U/L
ALT SERPL-CCNC: 9 U/L
ANION GAP SERPL CALC-SCNC: 10 MMOL/L
APPEARANCE: CLEAR
AST SERPL-CCNC: 14 U/L
BACTERIA: NEGATIVE
BASOPHILS # BLD AUTO: 0.04 K/UL
BASOPHILS NFR BLD AUTO: 0.6 %
BILIRUB SERPL-MCNC: 0.7 MG/DL
BILIRUBIN URINE: NEGATIVE
BLOOD URINE: NEGATIVE
BUN SERPL-MCNC: 8 MG/DL
CALCIUM SERPL-MCNC: 9.7 MG/DL
CCP AB SER IA-ACNC: <8 UNITS
CHLORIDE SERPL-SCNC: 107 MMOL/L
CHOLEST SERPL-MCNC: 253 MG/DL
CK SERPL-CCNC: 107 U/L
CO2 SERPL-SCNC: 27 MMOL/L
COLOR: NORMAL
CREAT SERPL-MCNC: 0.73 MG/DL
CREAT SPEC-SCNC: 46 MG/DL
CREAT/PROT UR: 0.1 RATIO
CRP SERPL-MCNC: <3 MG/L
EGFR: 95 ML/MIN/1.73M2
EOSINOPHIL # BLD AUTO: 0.06 K/UL
EOSINOPHIL NFR BLD AUTO: 0.9 %
ERYTHROCYTE [SEDIMENTATION RATE] IN BLOOD BY WESTERGREN METHOD: 17 MM/HR
ESTIMATED AVERAGE GLUCOSE: 114 MG/DL
GLUCOSE QUALITATIVE U: NEGATIVE
GLUCOSE SERPL-MCNC: 86 MG/DL
HBA1C MFR BLD HPLC: 5.6 %
HCT VFR BLD CALC: 39.4 %
HDLC SERPL-MCNC: 100 MG/DL
HGB BLD-MCNC: 12.5 G/DL
HYALINE CASTS: 0 /LPF
IMM GRANULOCYTES NFR BLD AUTO: 0.3 %
IRON SATN MFR SERPL: 30 %
IRON SERPL-MCNC: 98 UG/DL
KETONES URINE: NEGATIVE
LDH SERPL-CCNC: 191 U/L
LDLC SERPL CALC-MCNC: 138 MG/DL
LEUKOCYTE ESTERASE URINE: ABNORMAL
LYMPHOCYTES # BLD AUTO: 2.31 K/UL
LYMPHOCYTES NFR BLD AUTO: 33.2 %
MAN DIFF?: NORMAL
MCHC RBC-ENTMCNC: 28 PG
MCHC RBC-ENTMCNC: 31.7 GM/DL
MCV RBC AUTO: 88.3 FL
MICROSCOPIC-UA: NORMAL
MONOCYTES # BLD AUTO: 0.46 K/UL
MONOCYTES NFR BLD AUTO: 6.6 %
NEUTROPHILS # BLD AUTO: 4.07 K/UL
NEUTROPHILS NFR BLD AUTO: 58.4 %
NITRITE URINE: NEGATIVE
NONHDLC SERPL-MCNC: 153 MG/DL
PH URINE: 6.5
PLATELET # BLD AUTO: NORMAL K/UL
PLATELET # PLAS AUTO: 303 K/UL
POTASSIUM SERPL-SCNC: 4.1 MMOL/L
PROT SERPL-MCNC: 6.8 G/DL
PROT UR-MCNC: 5 MG/DL
PROTEIN URINE: NEGATIVE
RBC # BLD: 4.46 M/UL
RBC # FLD: 15.6 %
RED BLOOD CELLS URINE: 1 /HPF
RF+CCP IGG SER-IMP: NEGATIVE
SODIUM SERPL-SCNC: 143 MMOL/L
SPECIFIC GRAVITY URINE: 1.01
SQUAMOUS EPITHELIAL CELLS: 1 /HPF
TIBC SERPL-MCNC: 324 UG/DL
TRIGL SERPL-MCNC: 76 MG/DL
TSH SERPL-ACNC: 2.4 UIU/ML
UIBC SERPL-MCNC: 226 UG/DL
UROBILINOGEN URINE: NORMAL
WBC # FLD AUTO: 6.96 K/UL
WHITE BLOOD CELLS URINE: 2 /HPF

## 2023-01-13 ENCOUNTER — TRANSCRIPTION ENCOUNTER (OUTPATIENT)
Age: 58
End: 2023-01-13

## 2023-01-15 LAB
ALBUMIN MFR SERPL ELPH: 59.5 %
ALBUMIN SERPL-MCNC: 4 G/DL
ALBUMIN/GLOB SERPL: 1.4 RATIO
ALPHA1 GLOB MFR SERPL ELPH: 4.5 %
ALPHA1 GLOB SERPL ELPH-MCNC: 0.3 G/DL
ALPHA2 GLOB MFR SERPL ELPH: 9.9 %
ALPHA2 GLOB SERPL ELPH-MCNC: 0.7 G/DL
B-GLOBULIN MFR SERPL ELPH: 11.2 %
B-GLOBULIN SERPL ELPH-MCNC: 0.8 G/DL
DEPRECATED KAPPA LC FREE/LAMBDA SER: 1.81 RATIO
GAMMA GLOB FLD ELPH-MCNC: 1 G/DL
GAMMA GLOB MFR SERPL ELPH: 14.9 %
IGA SER QL IEP: 154 MG/DL
IGG SER QL IEP: 1183 MG/DL
IGM SER QL IEP: 47 MG/DL
INTERPRETATION SERPL IEP-IMP: NORMAL
KAPPA LC CSF-MCNC: 0.96 MG/DL
KAPPA LC SERPL-MCNC: 1.74 MG/DL
M PROTEIN SPEC IFE-MCNC: NORMAL
PROT SERPL-MCNC: 6.8 G/DL
PROT SERPL-MCNC: 6.8 G/DL

## 2023-02-02 ENCOUNTER — TRANSCRIPTION ENCOUNTER (OUTPATIENT)
Age: 58
End: 2023-02-02

## 2023-02-02 LAB

## 2023-02-03 ENCOUNTER — TRANSCRIPTION ENCOUNTER (OUTPATIENT)
Age: 58
End: 2023-02-03

## 2023-02-04 ENCOUNTER — TRANSCRIPTION ENCOUNTER (OUTPATIENT)
Age: 58
End: 2023-02-04

## 2023-02-21 ENCOUNTER — TRANSCRIPTION ENCOUNTER (OUTPATIENT)
Age: 58
End: 2023-02-21

## 2023-02-22 ENCOUNTER — TRANSCRIPTION ENCOUNTER (OUTPATIENT)
Age: 58
End: 2023-02-22

## 2023-03-01 ENCOUNTER — APPOINTMENT (OUTPATIENT)
Dept: RHEUMATOLOGY | Facility: CLINIC | Age: 58
End: 2023-03-01
Payer: COMMERCIAL

## 2023-03-01 VITALS
DIASTOLIC BLOOD PRESSURE: 86 MMHG | TEMPERATURE: 97.6 F | HEART RATE: 59 BPM | RESPIRATION RATE: 16 BRPM | BODY MASS INDEX: 23.14 KG/M2 | SYSTOLIC BLOOD PRESSURE: 130 MMHG | HEIGHT: 66 IN | WEIGHT: 144 LBS | OXYGEN SATURATION: 98 %

## 2023-03-01 PROCEDURE — 99214 OFFICE O/P EST MOD 30 MIN: CPT

## 2023-03-01 RX ORDER — PREDNISONE 5 MG/1
5 TABLET ORAL
Qty: 105 | Refills: 0 | Status: COMPLETED | COMMUNITY
Start: 2023-01-10 | End: 2023-02-03

## 2023-03-01 NOTE — ASSESSMENT
[FreeTextEntry1] : 58  year old female with 6+ years of weakness and muscle tenderness and aching. \par positive anti SRP, + RF, + RNP.  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 \par Rtx 4/23/19 and 5/16/19 \par Now on MMF 3 g daily  (had return of pain with lowering dose and now better on full dose) \par \par #necrotizing myopathy\par #mononeuropathy of UE b/l\par #secondary fibromyalgia - poor sleep hygiene, multiple tender points, anxiety\par #structural back disease - better with injections \par # GERD\par # Fatigue - NOS\par # nasal ulceration\par \par -> Will check laboratory tests to look for markers of disease activity and also to assess for medication toxicity.\par -> continue gabapentin\par ->  continue CellCept 3g daily \par ->  Steroid taper in January 2023: completed with good response and sustained response\par ->  Follow up Pulm cards and  notes reviewed \par ->  GERD - continue gaviscon to omeprazole and follow up GI as planned \par -> nasal ulceration - to see ENT\par \par HCM \par Prevnar in dec 2019 and pneumovax in march 2020\par Moderna x 2  (2-2021) and booster in 8-2021  and to get booster in fall 2022 (bivalent)\par Flu shot fall 2022 \par \par Bone Health\par DeXA 4-2022 - normal \par Continue calcium total 1200 mg\par \par \par More than 50% of the encounter was spent counselling the patient on differential, workup, disease course, and treatment/management.   Education was provided to the patient during this encounter. All questions and concerns were answered and addressed in detail. The patient verbalized understanding and agreed to plan\par \par Patient has been instructed to call for an earlier appointment if new symptoms develop \par Patient has been instructed to make a follow up appointment 12 weeks. \par \par

## 2023-03-01 NOTE — DATA REVIEWED
[FreeTextEntry1] : Laboratory and radiology studies that were personally reviewed at today's visit are summarized in above and below:\par CT chest ():  diffuse ree in bud nodules and GGO in the lower lungs and mild centrilobular emphysema.   \par Pulm note (12-28-22)  stable pf,t symtomatic improvement in ARAGON, no ILD On CT chest, DLCO improved on PFT.  \par Cards (12-20-22):  improved BP with change in meds. \par MRI neck (9-2022):  multilevel spondylosis \par PMR note - RAHEEM with relief. \par dexa 4-2022:  Normal \par ortho note (5-16-22):  reactive cervical myofascial pain - trigger point injections \par 7.20.16:  RF=50, ANJUM = 1:80, AEI=369, CK 1793\par ortho note (8-22-22) cervical myofascial pain tx with trigger point injections.

## 2023-03-01 NOTE — PHYSICAL EXAM
[General Appearance - Alert] : alert [General Appearance - In No Acute Distress] : in no acute distress [Sclera] : the sclera and conjunctiva were normal [PERRL With Normal Accommodation] : pupils were equal in size, round, and reactive to light [Extraocular Movements] : extraocular movements were intact [Outer Ear] : the ears and nose were normal in appearance [Oropharynx] : the oropharynx was normal [Neck Appearance] : the appearance of the neck was normal [Neck Cervical Mass (___cm)] : no neck mass was observed [Jugular Venous Distention Increased] : there was no jugular-venous distention [Thyroid Diffuse Enlargement] : the thyroid was not enlarged [Thyroid Nodule] : there were no palpable thyroid nodules [Skin Color & Pigmentation] : normal skin color and pigmentation [] : no rash [Oriented To Time, Place, And Person] : oriented to person, place, and time [Impaired Insight] : insight and judgment were intact [FreeTextEntry1] : 5/5 (RUE-prox,distal, LUE - distal), RLE (prox,distal) and LLE (distal), 4/5 LUE (prox) and 3+/5 (LLE Prox)

## 2023-03-02 ENCOUNTER — TRANSCRIPTION ENCOUNTER (OUTPATIENT)
Age: 58
End: 2023-03-02

## 2023-03-02 LAB
ALBUMIN SERPL ELPH-MCNC: 4.4 G/DL
ALP BLD-CCNC: 64 U/L
ALT SERPL-CCNC: 11 U/L
ANCA AB SER-IMP: NEGATIVE
ANION GAP SERPL CALC-SCNC: 16 MMOL/L
APPEARANCE: CLEAR
AST SERPL-CCNC: 16 U/L
BACTERIA: NEGATIVE
BASOPHILS # BLD AUTO: 0.04 K/UL
BASOPHILS NFR BLD AUTO: 0.7 %
BILIRUB SERPL-MCNC: 0.6 MG/DL
BILIRUBIN URINE: NEGATIVE
BLOOD URINE: NEGATIVE
BUN SERPL-MCNC: 14 MG/DL
C-ANCA SER-ACNC: NEGATIVE
CALCIUM SERPL-MCNC: 9.4 MG/DL
CHLORIDE SERPL-SCNC: 105 MMOL/L
CK SERPL-CCNC: 137 U/L
CO2 SERPL-SCNC: 22 MMOL/L
COLOR: YELLOW
CREAT SERPL-MCNC: 0.66 MG/DL
CREAT SPEC-SCNC: 74 MG/DL
CREAT/PROT UR: 0.1 RATIO
CRP SERPL-MCNC: <3 MG/L
EGFR: 100 ML/MIN/1.73M2
EOSINOPHIL # BLD AUTO: 0.03 K/UL
EOSINOPHIL NFR BLD AUTO: 0.5 %
ERYTHROCYTE [SEDIMENTATION RATE] IN BLOOD BY WESTERGREN METHOD: 15 MM/HR
GLUCOSE QUALITATIVE U: NEGATIVE
GLUCOSE SERPL-MCNC: 92 MG/DL
HAV IGM SER QL: NONREACTIVE
HBV CORE IGG+IGM SER QL: NONREACTIVE
HBV CORE IGM SER QL: NONREACTIVE
HBV SURFACE AG SER QL: NONREACTIVE
HCT VFR BLD CALC: 38 %
HCV AB SER QL: NONREACTIVE
HCV S/CO RATIO: 0.07 S/CO
HGB BLD-MCNC: 12.1 G/DL
HYALINE CASTS: 1 /LPF
IMM GRANULOCYTES NFR BLD AUTO: 0.2 %
KETONES URINE: NEGATIVE
LEUKOCYTE ESTERASE URINE: NEGATIVE
LYMPHOCYTES # BLD AUTO: 2.49 K/UL
LYMPHOCYTES NFR BLD AUTO: 40.7 %
MAN DIFF?: NORMAL
MCHC RBC-ENTMCNC: 27.6 PG
MCHC RBC-ENTMCNC: 31.8 GM/DL
MCV RBC AUTO: 86.6 FL
MICROSCOPIC-UA: NORMAL
MONOCYTES # BLD AUTO: 0.41 K/UL
MONOCYTES NFR BLD AUTO: 6.7 %
NEUTROPHILS # BLD AUTO: 3.14 K/UL
NEUTROPHILS NFR BLD AUTO: 51.2 %
NITRITE URINE: NEGATIVE
P-ANCA TITR SER IF: NEGATIVE
PH URINE: 7
PLATELET # BLD AUTO: NORMAL K/UL
PLATELET # PLAS AUTO: 266 K/UL
POTASSIUM SERPL-SCNC: 4.2 MMOL/L
PROT SERPL-MCNC: 6.2 G/DL
PROT UR-MCNC: 8 MG/DL
PROTEIN URINE: NEGATIVE
RBC # BLD: 4.39 M/UL
RBC # FLD: 15.4 %
RED BLOOD CELLS URINE: 1 /HPF
SODIUM SERPL-SCNC: 142 MMOL/L
SPECIFIC GRAVITY URINE: 1.02
SQUAMOUS EPITHELIAL CELLS: 1 /HPF
UROBILINOGEN URINE: ABNORMAL
WBC # FLD AUTO: 6.12 K/UL
WHITE BLOOD CELLS URINE: 1 /HPF

## 2023-03-03 LAB
MYELOPEROXIDASE AB SER QL IA: <5 UNITS
MYELOPEROXIDASE CELLS FLD QL: NEGATIVE
PROTEINASE3 AB SER IA-ACNC: <5 UNITS
PROTEINASE3 AB SER-ACNC: NEGATIVE

## 2023-03-07 ENCOUNTER — APPOINTMENT (OUTPATIENT)
Dept: INTERNAL MEDICINE | Facility: CLINIC | Age: 58
End: 2023-03-07
Payer: COMMERCIAL

## 2023-03-07 ENCOUNTER — APPOINTMENT (OUTPATIENT)
Dept: ORTHOPEDIC SURGERY | Facility: CLINIC | Age: 58
End: 2023-03-07
Payer: COMMERCIAL

## 2023-03-07 VITALS
BODY MASS INDEX: 22.82 KG/M2 | DIASTOLIC BLOOD PRESSURE: 84 MMHG | HEIGHT: 66 IN | OXYGEN SATURATION: 98 % | WEIGHT: 142 LBS | HEART RATE: 91 BPM | RESPIRATION RATE: 17 BRPM | TEMPERATURE: 99.2 F | SYSTOLIC BLOOD PRESSURE: 136 MMHG

## 2023-03-07 VITALS
SYSTOLIC BLOOD PRESSURE: 156 MMHG | OXYGEN SATURATION: 98 % | WEIGHT: 142 LBS | HEIGHT: 66 IN | DIASTOLIC BLOOD PRESSURE: 87 MMHG | BODY MASS INDEX: 22.82 KG/M2 | HEART RATE: 63 BPM

## 2023-03-07 DIAGNOSIS — Z87.42 PERSONAL HISTORY OF OTHER DISEASES OF THE FEMALE GENITAL TRACT: ICD-10-CM

## 2023-03-07 DIAGNOSIS — U07.1 COVID-19: ICD-10-CM

## 2023-03-07 DIAGNOSIS — Z87.898 PERSONAL HISTORY OF OTHER SPECIFIED CONDITIONS: ICD-10-CM

## 2023-03-07 DIAGNOSIS — R10.2 PELVIC AND PERINEAL PAIN: ICD-10-CM

## 2023-03-07 PROCEDURE — 99396 PREV VISIT EST AGE 40-64: CPT | Mod: 25

## 2023-03-07 PROCEDURE — 99214 OFFICE O/P EST MOD 30 MIN: CPT

## 2023-03-07 PROCEDURE — 96127 BRIEF EMOTIONAL/BEHAV ASSMT: CPT

## 2023-03-07 PROCEDURE — A4565: CPT

## 2023-03-07 PROCEDURE — 73030 X-RAY EXAM OF SHOULDER: CPT | Mod: RT

## 2023-03-07 RX ORDER — MULTIVIT,IRON,MINERALS/LUTEIN
TABLET ORAL
Refills: 0 | Status: ACTIVE | COMMUNITY

## 2023-03-07 RX ORDER — FLUTICASONE PROPIONATE 50 UG/1
50 SPRAY, METERED NASAL
Qty: 1 | Refills: 3 | Status: DISCONTINUED | COMMUNITY
Start: 2020-12-29 | End: 2023-03-07

## 2023-03-07 RX ORDER — MECOBALAMIN 1000 MCG
1 TABLET,CHEWABLE ORAL
Qty: 90 | Refills: 1 | Status: DISCONTINUED | COMMUNITY
Start: 2017-08-11 | End: 2023-03-07

## 2023-03-07 RX ORDER — ADHESIVE TAPE 3"X 2.3 YD
50 MCG TAPE, NON-MEDICATED TOPICAL
Qty: 90 | Refills: 1 | Status: DISCONTINUED | COMMUNITY
End: 2023-03-07

## 2023-03-07 RX ORDER — ALUMINUM HYDROXIDE AND MAGNESIUM CARBONATE 254; 237.5 MG/5ML; MG/5ML
254-237.5 LIQUID ORAL
Refills: 0 | Status: DISCONTINUED | COMMUNITY
Start: 2022-11-10 | End: 2023-03-07

## 2023-03-07 NOTE — REVIEW OF SYSTEMS
[Negative] : Neurological [FreeTextEntry4] : see HPI [FreeTextEntry9] : see HPI [de-identified] : see HPI

## 2023-03-07 NOTE — PHYSICAL EXAM
[No Acute Distress] : no acute distress [Well-Appearing] : well-appearing [Normal Sclera/Conjunctiva] : normal sclera/conjunctiva [PERRL] : pupils equal round and reactive to light [EOMI] : extraocular movements intact [Normal Outer Ear/Nose] : the outer ears and nose were normal in appearance [Normal Oropharynx] : the oropharynx was normal [Normal TMs] : both tympanic membranes were normal [Normal Nasal Mucosa] : the nasal mucosa was normal [No Lymphadenopathy] : no lymphadenopathy [Supple] : supple [Thyroid Normal, No Nodules] : the thyroid was normal and there were no nodules present [No Respiratory Distress] : no respiratory distress  [Clear to Auscultation] : lungs were clear to auscultation bilaterally [Normal Rate] : normal rate  [Regular Rhythm] : with a regular rhythm [Normal S1, S2] : normal S1 and S2 [No Murmur] : no murmur heard [Pedal Pulses Present] : the pedal pulses are present [No Edema] : there was no peripheral edema [Soft] : abdomen soft [Non Tender] : non-tender [Non-distended] : non-distended [No Masses] : no abdominal mass palpated [No HSM] : no HSM [Normal Supraclavicular Nodes] : no supraclavicular lymphadenopathy [Normal Anterior Cervical Nodes] : no anterior cervical lymphadenopathy [No CVA Tenderness] : no CVA  tenderness [No Spinal Tenderness] : no spinal tenderness [No Joint Swelling] : no joint swelling [No Rash] : no rash [No Focal Deficits] : no focal deficits [Normal Gait] : normal gait [Normal Affect] : the affect was normal [Alert and Oriented x3] : oriented to person, place, and time [de-identified] : no visible d/c or bleeding in nares [de-identified] : FROM [de-identified] : right shoulder: limited ROM due to pain reported, no focal rash/warmth, nl , sensation grossly intact

## 2023-03-07 NOTE — HEALTH RISK ASSESSMENT
[0] : 2) Feeling down, depressed, or hopeless: Not at all (0) [PHQ-2 Negative - No further assessment needed] : PHQ-2 Negative - No further assessment needed [Patient reported mammogram was normal] : Patient reported mammogram was normal [Patient reported PAP Smear was abnormal] : Patient reported PAP Smear was abnormal [Patient reported bone density results were normal] : Patient reported bone density results were normal [Patient reported colonoscopy was normal] : Patient reported colonoscopy was normal [HIV test declined] : HIV test declined [Feels Safe at Home] : Feels safe at home [Smoke Detector] : smoke detector [Carbon Monoxide Detector] : carbon monoxide detector [Guns at Home] : guns at home [Seat Belt] :  uses seat belt [Sunscreen] : uses sunscreen [[id number]] : 0 [MammogramDate] : 04/22 [PapSmearDate] : 08/22 [BoneDensityDate] : 04/22 [ColonoscopyDate] : 04/18 [ColonoscopyComments] : told nl and repeat in 5 yrs per pt [HepatitisCDate] : 03/23 [HepatitisCComments] : negative [de-identified] :  is retired - kept in locked safe

## 2023-03-07 NOTE — ASSESSMENT
[FreeTextEntry1] : \par 59 yo F pmhx HTN, HLD, preDM, GERD, empysemia, MCTD, necrotizing myopathy, kidney stone, vit d insuff, B12 def for CPE\par \par \par hx left neck/shoulder pain- reports resolved s/p injections for pain.  c/o similar sx's on left neck/shoulder x 3d\par -Has ortho f/u today.\par \par hx nasal irritation, epistaxis-\par -ENT appt pending 3/23 for eval\par -encouraged to continue off Flonase\par -avoidance of aggressive nasal stimulation advised\par \par HTN, atypical CP, hx abnl EKG- resolved\par -on amlodipine- benazepril, off HCTZ 12.5 per cardio in 12/22 2/2 dizziness\par -11/20 exercise stress test: normal study\par -3/22 echo: EF 60-65%, mild MR/TR, no LV fxn and size (no chg x/w 11/20)\par -3/23 UA negative, urine prt/crt wnl\par -3/23 cmp wnl, cbc wnl, except plt clumped\par -seen by cardio, Dr. Rivero, 12/22 felt dizziness likely 2/2 HCTZ given low BPs noted and advised Rx d/c.  Told CP unlikely cardiac, planned to f/u 5/23\par -followed by optho, hx eval 10/22 with nl dilated exam- yearly advised\par -low salt diet advised\par -advised prompt medical eval if sx's recur\par \par hx MCTD, necrotizing myopathy (hx muscle bx), hx imbalance/HAs (resolved)- \par -followed by rheum, seen 3/23 noted well s/p steroid taper in 1/23, repeat labs done.  Has fu 6/23.\par -hx serum sickness with IVIg, avoiding during covid pandemic as high risk with use per pt\par -on CellCept (restarted fall 2019)\par -on Rituxan since 4/19; Neurontin, etodolac since 9/20 (when diclofenac d/c'd by rheum)\par -hx PT \par -followed by neuro, seen 3/21, advised to continue gabapentin and physical therapy and follow-up pending\par -hx nl optho eval 10/22\par -3/23 cmp wnl, cbc wnl, except plt clumped; plt in blue top tube 260 (wnl)\par \par hx ARAGON, emphysema, abnl CT chest- resolved per pt\par -followed by pulm, last seen 12/22 for repeat PFTs, told stable and no meds/intervention advised for hx mild emphysema.  Planned to f/u in 1 yr for repeat CT chest then. \par -followed by cardio\par \par preDM- 1/23 A1c 5.6 (was 5.9 11/21) \par -ADA diet, exercise encouraged\par -check A1c\par \par HLD- 1/23 Tchol 253 TG 76  \par -low fat diet, exercise encouraged\par -check lipids\par \par GERD, IBS/constipation, hx hemorrhoids-\par -followed by GI\par -hx screening colonoscopy 4/18, rec: repeat in 5 yrs (Dr. Prosper Guerra)- plans to f/u soon.  \par -on omeprazole\par -on Metamucil prn\par -on prep H prn\par -asked to forward records\par \par hx right kidney stone- dx'd by  7/22 on w/u of pain- since resolved\par -followed by , seen 7/22 with US done, felt not cause of sx and no intervention advised.  Planned to f/u in 1 yr with \par \par hx stress 2/2 mother's death in 4/20, depression/anxiety a/w grief- reports controlled; denies acute sx's\par -declines standing mood medication (ie SSRI)\par -on clonazepam with help- refilled prior, istop checked.  Risks/benefits/SEs discussed. Advised to avoid concurrent use with Ambien (given by rheum, now off), amitriptyline and ETOH given potential additive effects. Advised of need for visit for further refills.\par - referral and info for [facility] given prior- pending.  Declines visit with in office therapist.\par -info for behavioral health crisis walk-in center given prior (confirms has info)\par -advised to f/u if sx's worsen\par \par hx vit d def-\par -on OTC supp\par -check level\par \par hx B12 def-\par -1/21 B12/folate wnl - check repeat\par -on MVI\par \par hx skin rash, itching- dx'd eczematous dermatitis by derm 3/19 with amitriptyline dose increased. Resolved.\par -followed by derm\par -on amitriptyline\par -hx FBSE 9/18, yearly advised and referral given. Regular use of sun block for skin cancer prevention advised.\par \par MISC:  Continued social distancing and measure for covid19 prevention encouraged.  \par -hx Moderna vaccine series, last booster 9/22\par \par \par HCM\par -check screening labs; declines HIV/STD screening\par -hx negative hep C screening 3/23\par -hx flu shot 1/23\par -hx Tdap 8/18\par -hx prevnar 13 12/19\par -hx PVX 3/20\par -advised prevnar 20- declines today, wishes to consider in the future\par -advised to check on shingrix insurance coverage and get okay to get from rheum prior to considering\par -hx abnl PAP 8/21 (LSIL), HPV + , s/p colposcopy 10/21 path: +FLEX I, hx negative PAP 8/22.\par -hx negative mammo 4/22, left US 11/22- stable - followed by Dr. Carrera, plans to f/u for Rx's\par -hx nl DEXA 4/22\par -hx screening colonoscopy 4/18, rec: repeat in 5 yrs (Dr. Prosper Guerra)- plans to f/u soon.  Asked to forward records.\par -yearly dental screening advised\par \par Pt's cell: [phone]\par \par Advised f/u in 3mo\par Labs drawn in office today.\par

## 2023-03-07 NOTE — HISTORY OF PRESENT ILLNESS
[Health Maintenance] : health maintenance [Spouse] : spouse [] :  [Working Full Time] : working full time [Occupation ___] : occupation: [unfilled] [Former Cigarette Smoker] : is a former cigarette smoker [Quit Cigarettes: ___] : quit smoking [unfilled] [Occasional Use] : occasional alcohol use [Never] : has never used illicit drugs [Fair] : fair [Reg. Dental Visits] : She has regular dental visits [Healthy Diet] : She consumes a diverse and healthy diet [Regular Exercise] : She exercises regularly [Postmenopausal] : the patient is postmenopausal [FreeTextEntry1] : \par CPE [Binge Drinking] : denies binge drinking [Patient Concern] : no personal concern about alcohol use [Family Concern] : no family concern about alcohol use [Vision Problems] : She denies vision problems [Hearing Loss] : She denies hearing loss [de-identified] : 11/21 [de-identified] : hx 1/2 ppd x 20 yrs [de-identified] : hx flu shot 1/23, hx Tdap 8/18, -hx prevnar 13 12/19, hx PVX 3/20, hx hep B series, no hx shingles vaccine [de-identified] : \par 59 yo F pmhx HTN, HLD, preDM, GERD, empysemia, MCTD, necrotizing myopathy, kidney stone, vit d insuff, B12 def for CPE\par \par Feeling well.\par Does not need med refills.\par Fasting for labs.\par \par Reports is socially distancing and using precautions for covid prevention.\par Denies sick or covid positive contacts.\par -hx Moderna vaccine series, last booster 9/22\par \par hx left neck/shoulder pain- reports resolved s/p injections for pain.  c/o similar sx's on left neck/shoulder x 3d, has ortho appt today for eval.  Denies prior injury.  States tried OTC Tylenol/advil and heat w/o help\par -is right handed, denies weakness/swelling or redness; rare tingling in arm with use\par -followed by ortho, last seen 8/22 with TPI given with help.  MRI done told degenerative changes and advised to see pain specialist - seen 10/22 and given epidural with help.  Has ortho f/u today.\par \par hx grief, depression/anxiety -  reports controlled sx's.  Denies panic attacks/HI or SI.  Meeting with [employer] group weekly x 1 yr, goes on retreat q 4mo.\par -on Klonopin prn with help, on/off (qhs mainly)- no recent need\par -Therapy pending, has resources\par -hx onset 2/2 mom passing away in 4/20 (was at a NH and endstage COPD)\par -sleeping well\par \par reports nasal irritation x 3 weeks, internally, onset if blows nose\par +postnasal drip on/off\par +spots of blood on/off, stops on own\par no Flonase use in many months, no other meds/rinse used\par ENT appt pending 3/23 for eval\par \par HTN, hx chest discomfort - reports has resolved  \par -on med regimen, adherent.  Odd HCTZ since 12/22 per cardio\par -home BP:  140-150s/80s\par -seen by cardio, Dr. Rivero, 12/22 felt dizziness likely 2/2 HCTZ given low BPs noted and advised Rx d/c.  Told CP unlikely cardiac, planned to f/u 5/23\par -hx optho eval 10/22, told had nl dilated exam and to f/u in 1 yr\par \par exercise: has , meet 2x/wk, doing strength training x 1 hr - done w/o sx's or limitations\par \par hx ARAGON, hx abnl CT chest 11/22-\par -followed by pulm, last seen 12/22 for repeat PFTs, told stable and no meds/intervention advised for hx mild emphysema.  Planned to f/u in 1 yr for repeat CT chest then. \par \par hx GERD- controlled with PPI\par -followed by GI (? name, [facility]) last seen 11/22 with PPI started, plans to f/u soon re: c-scope\par \par Reports nl appetite and BMs.  \par Denies GI complaints.\par \par hx MCTD, necrotizing myopathy (hx muscle bx), hx imbalance (improved)/ /HAs (resolved)- stable\par -followed by rheum, seen 3/23 noted well s/p steroid taper in 1/23, repeat labs done.  Has fu 6/23.\par -reports hx improved imbalance/HAs (hx negative CTH 3/20)- notes HAs resolved since 5/20.  States saw neuro 3/21, advised to continue gabapentin and physical therapy \par \par hx GYN eval 8/21 with PAP/HPV- found abnl, s/p colposcopy 10/21 with abnl bx, planned to repeat PAP in 1 yr.  \par -had GYN f/u 8/22 with negative PAP.\par -denies  complaints\par -hx right kidney stone, dx'd by  7/22 when seen for hip pain- felt not cause of sx and no intervention advised.  Planned to f/u in 1 yr.

## 2023-03-08 ENCOUNTER — TRANSCRIPTION ENCOUNTER (OUTPATIENT)
Age: 58
End: 2023-03-08

## 2023-03-08 LAB
25(OH)D3 SERPL-MCNC: 28.3 NG/ML
CHOLEST SERPL-MCNC: 247 MG/DL
ESTIMATED AVERAGE GLUCOSE: 120 MG/DL
FOLATE SERPL-MCNC: 11.4 NG/ML
HBA1C MFR BLD HPLC: 5.8 %
HDLC SERPL-MCNC: 101 MG/DL
LDLC SERPL CALC-MCNC: 136 MG/DL
NONHDLC SERPL-MCNC: 146 MG/DL
TRIGL SERPL-MCNC: 53 MG/DL
TSH SERPL-ACNC: 1.74 UIU/ML
VIT B12 SERPL-MCNC: 400 PG/ML

## 2023-03-09 ENCOUNTER — APPOINTMENT (OUTPATIENT)
Dept: OTOLARYNGOLOGY | Facility: CLINIC | Age: 58
End: 2023-03-09
Payer: COMMERCIAL

## 2023-03-09 VITALS
SYSTOLIC BLOOD PRESSURE: 134 MMHG | HEIGHT: 66 IN | TEMPERATURE: 97.7 F | HEART RATE: 59 BPM | BODY MASS INDEX: 23.14 KG/M2 | WEIGHT: 144 LBS | DIASTOLIC BLOOD PRESSURE: 75 MMHG

## 2023-03-09 DIAGNOSIS — Z86.79 PERSONAL HISTORY OF OTHER DISEASES OF THE CIRCULATORY SYSTEM: ICD-10-CM

## 2023-03-09 PROCEDURE — 99204 OFFICE O/P NEW MOD 45 MIN: CPT | Mod: 25

## 2023-03-09 PROCEDURE — 31231 NASAL ENDOSCOPY DX: CPT

## 2023-03-09 RX ORDER — CHOLECALCIFEROL (VITAMIN D3) 25 MCG
TABLET ORAL
Refills: 0 | Status: ACTIVE | COMMUNITY

## 2023-03-09 NOTE — CONSULT LETTER
[Dear  ___] : Dear  [unfilled], [Consult Letter:] : I had the pleasure of evaluating your patient, [unfilled]. [Please see my note below.] : Please see my note below. [Consult Closing:] : Thank you very much for allowing me to participate in the care of this patient.  If you have any questions, please do not hesitate to contact me. [Sincerely,] : Sincerely, [FreeTextEntry2] : Lakeisha Jones MD ([place], [place]) [FreeTextEntry3] : Srinivas Dudley MD, PEYMAN\par Otolaryngology\par Sinus and Endoscopic Skull Base Surgery\par Head and Neck Surgery\par \par [address], [facility] [address]\[place] [address]\par \par [address] [address]\par \par Tel: [phone]\par Fax:[phone]

## 2023-03-09 NOTE — PROCEDURE
[FreeTextEntry6] : Bilateral nasal endoscopy:\par \par Left:\par Septum midline, cursting along floor of nose on left\par Mild inferior turbinate hypertrophy \par Middle meatus clear, without masses, polyps, or pus\par Sphenoethmoidal recess clear, without masses, polyps, or pus\par \par Right: \par Septum midline, a few areas that may be suspcious for bleeding but no clot or crust noted\par Mild inferior turbinate hypertrophy\par Middle meatus clear, without masses, polyps, or pus \par Sphenoethmoidal recess clear, without masses, polyps, or pus\par \par

## 2023-03-09 NOTE — HISTORY OF PRESENT ILLNESS
[de-identified] : 58 year old woman, referred by Rheum, Dr. Lakeisha Jones, for epistaxis\par History of nasal lesions?\par States epistaxis started about 3 weeks ago - nasal irritation - mild blood noted when nose blown into tissue\par Reports difficulty sleeping at night\par Most recent episode of epistaxis about 2 weeks ago - unsure which nostril - bleeding lasted less than 1 minute - resolved on its own\par No injury/trauma to face or nose\par Denies use of anticoagulant medication\par Reports intermittent PND - yellow sputum produced - facial pain/pressure radiating to both ears\par Denies nasal congestion, anterior rhinorrhea, anosmia, recent fevers or sinus infections. \par No nasal sprays used

## 2023-03-09 NOTE — PHYSICAL EXAM
[Nasal Endoscopy Performed] : nasal endoscopy was performed, see procedure section for findings [Normal] :  tongue is normal

## 2023-03-10 ENCOUNTER — TRANSCRIPTION ENCOUNTER (OUTPATIENT)
Age: 58
End: 2023-03-10

## 2023-03-17 ENCOUNTER — TRANSCRIPTION ENCOUNTER (OUTPATIENT)
Age: 58
End: 2023-03-17

## 2023-03-17 NOTE — DISCUSSION/SUMMARY
[de-identified] : Iza presents with severe shoulder pain, which is concerning for possible calcific tendinitis.  No evidence of calcium deposits seen on x-ray today.  Provided with prescription for prednisone and cyclobenzaprine for pain.  Provided with a sling for comfort.  MRI of the shoulder ordered.  Follow-up after imaging.\par \par Connie Dubon MD, EdM\par Sports Medicine [facility]\par Department of Orthopedics

## 2023-03-17 NOTE — HISTORY OF PRESENT ILLNESS
[de-identified] : Blade is here today for a follow up. Patient was last seen on August 22nd 2022 and referred to Dr Porter. Patient has been in care of Dr Lindsay for neck pain and has had an epidural injection with relief on left sided neck pain and left upper extremity pain. \par Today the patient is complaining of right anterior shoulder pain. States that the pain began 2 days ago without injury. Notes that the pain is severe and is not able to move her right arm. She reports that the pain radiated to the right elbow and on occasions to the right hand.\par Denies numbness or tingling \par + weakness of right upper extremity due to the pain\par States that she is unable to perform normal daily activities due to the pain

## 2023-03-17 NOTE — ADDENDUM
[FreeTextEntry1] : I Shelbi Foreman ATC, assisted in the history and documentation of the patient with Dr Connie Dubon on 03/10/2023 5:19 PM\par

## 2023-03-17 NOTE — PHYSICAL EXAM
[de-identified] : Constitutional: Well-nourished, well-developed, No acute distress\par Respiratory:  Good respiratory effort, no SOB\par Lymphatic: No regional lymphadenopathy, no lymphedema\par Psychiatric: Pleasant and normal affect, alert and oriented x3\par Skin: Clean dry and intact B/L UE/LE\par Musculoskeletal: normal except where as noted in regional exam\par \par C-spine: ROM painful/not painful, neg spurlings, +ttp paraspinals and upper traps \par \par \par right Shoulder:\par APPEARANCE: no marked deformities, no swelling or malalignment\par POSITIVE TENDERNESS:supraspinatus, LH biceps\par NONTENDER: infraspinatus, teres minor,anterior and posterior capsule, AC joint\par ROM: +painful arc, no scapular winging or dyskinesia present\par RESISTIVE TESTING: pain with  5/5 resisted flex/ext, empty can/ER/IR, horizontal abd/add \par SPECIAL TESTS: neg Drop Arm, + Empty Can, + Avila/Neers, neg Rollins's, neg Speeds, neg Apprehension, neg cross arm adduction, neg apley's scratch test\par Vasc: 2+ radial pulse\par Neuro: AIN, PIN, Ulnar nerve intact to motor, DTRs 2+/4 biceps, triceps, brachioradialis\par Sensation: Intact to light touch throughout\par B/L Elbows:  No asymmetry, malalignment, or swelling, Full ROM, 5/5 strength in flexion/ext, pronation/supination, Joints stable\par B/L Wrist and Hand:  No asymmetry, malalignment, or swelling, Full ROM, 5/5 strength in wrist and long finger flexion/ext, radial/ulnar deviation, Joints stable\par  [de-identified] : \par The following radiographs were ordered and read by me during this patient's visit. I reviewed each radiograph in detail with the patient and discussed the findings as highlighted below. \par \par 3 views of the right shoulder were obtained today that show no fracture, or dislocation. There are no degenerative changes seen. There is no malalignment. No obvious osseous abnormality. Otherwise unremarkable.

## 2023-03-20 ENCOUNTER — TRANSCRIPTION ENCOUNTER (OUTPATIENT)
Age: 58
End: 2023-03-20

## 2023-03-21 ENCOUNTER — APPOINTMENT (OUTPATIENT)
Dept: RHEUMATOLOGY | Facility: CLINIC | Age: 58
End: 2023-03-21
Payer: COMMERCIAL

## 2023-03-21 VITALS
BODY MASS INDEX: 23.14 KG/M2 | DIASTOLIC BLOOD PRESSURE: 85 MMHG | HEART RATE: 81 BPM | WEIGHT: 144 LBS | SYSTOLIC BLOOD PRESSURE: 129 MMHG | TEMPERATURE: 97.2 F | OXYGEN SATURATION: 98 % | HEIGHT: 66 IN | RESPIRATION RATE: 16 BRPM

## 2023-03-21 PROCEDURE — 96372 THER/PROPH/DIAG INJ SC/IM: CPT

## 2023-03-21 PROCEDURE — 99214 OFFICE O/P EST MOD 30 MIN: CPT | Mod: 25

## 2023-03-21 RX ORDER — METHYLPRED ACET/NACL,ISO-OS/PF 80 MG/ML
80 VIAL (ML) INJECTION
Qty: 1 | Refills: 0 | Status: COMPLETED | OUTPATIENT
Start: 2023-03-21

## 2023-03-21 RX ADMIN — METHYLPREDNISOLONE ACETATE 0 MG/ML: 80 INJECTION, SUSPENSION INTRA-ARTICULAR; INTRALESIONAL; INTRAMUSCULAR; SOFT TISSUE at 00:00

## 2023-03-21 NOTE — ASSESSMENT
[FreeTextEntry1] : 58yF with necrotizing myopathy, positive anti SRP, + RF, + RNP.  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 here for acute visit for R. 3rd/4th finger pain \par \par R 3rd/4th finger pain x 4 days\par - exam not consistent with cellulitis, mild synovitis over 3rd/4th MCPs and PIP, also pain over R.4th finger Flexon tendon sheath (possible trigger finger?) \par - in office POC US showed synovitis \par - Depo IM 20mg administered in office\par - will start prednisone 20mg daily x 2 days, taper depending on response \par \par #necrotizing myopathy - continue MMF 3g daily  \par \par #mononeuropathy of UE b/l\par #secondary fibromyalgia - poor sleep hygiene, multiple tender points, anxiety\par #structural back disease - better with injections \par # GERD\par # Fatigue - NOS\par # nasal ulceration\par \par HCM \par Prevnar in dec 2019 and pneumovax in march 2020\par Moderna x 2  (2-2021) and booster in 8-2021  and to get booster in fall 2022 (bivalent)\par Flu shot fall 2022 \par \par [facility]\par DeXA 4-2022 - normal \par Continue calcium total 1200 mg\par \par Case discussed with Dr. Jones\par f/u in June 2023 \par \par [place] [facility]\par PGY-5 \par \par More than 50% of the encounter was spent counselling the patient on differential, workup, disease course, and treatment/management.   Education was provided to the patient during this encounter. All questions and concerns were answered and addressed in detail. The patient verbalized understanding and agreed to plan\par \par Patient has been instructed to call for an earlier appointment if new symptoms develop \par Patient has been instructed to make a follow up appointment 12 weeks. \par \par

## 2023-03-21 NOTE — DATA REVIEWED
[FreeTextEntry1] : Laboratory and radiology studies that were personally reviewed at today's visit are summarized in above and below:\par CT chest ():  diffuse ree in bud nodules and GGO in the lower lungs and mild centrilobular emphysema.   \par Pulm note (12-28-22)  stable pf,t symtomatic improvement in ARAGON, no ILD On CT chest, DLCO improved on PFT.  \par Cards (12-20-22):  improved BP with change in meds. \par MRI neck (9-2022):  multilevel spondylosis \par PMR note - RAHEEM with relief. \par dexa 4-2022:  Normal \par ortho note (5-16-22):  reactive cervical myofascial pain - trigger point injections \par 7.20.16:  RF=50, ANJUM = 1:80, SSL=660, CK 1793\par ortho note (8-22-22) cervical myofascial pain tx with trigger point injections.

## 2023-03-21 NOTE — PHYSICAL EXAM
[General Appearance - Alert] : alert [General Appearance - In No Acute Distress] : in no acute distress [Sclera] : the sclera and conjunctiva were normal [PERRL With Normal Accommodation] : pupils were equal in size, round, and reactive to light [Extraocular Movements] : extraocular movements were intact [Outer Ear] : the ears and nose were normal in appearance [Oropharynx] : the oropharynx was normal [Neck Appearance] : the appearance of the neck was normal [Neck Cervical Mass (___cm)] : no neck mass was observed [Jugular Venous Distention Increased] : there was no jugular-venous distention [Thyroid Diffuse Enlargement] : the thyroid was not enlarged [Thyroid Nodule] : there were no palpable thyroid nodules [Skin Color & Pigmentation] : normal skin color and pigmentation [] : no rash [Oriented To Time, Place, And Person] : oriented to person, place, and time [Impaired Insight] : insight and judgment were intact [FreeTextEntry1] : 5/5 muscle strenght in all 4 extremities,  mild synovitis over R 2nd and 3rd MCP and PIPs, + tender at 4th finger tendon sheath

## 2023-03-21 NOTE — HISTORY OF PRESENT ILLNESS
[___ Day(s) Ago] : [unfilled] day(s) ago [de-identified] : 3/1/2023  [FreeTextEntry1] : pt here for acute visit, c/o pain in R. 4th finger > R 3rd finger\par started 4 days ago, concern of cellulitis, no erytherma over the fingers, but has difficulty bending the R. 4th finger \par pt denies fever, chills, SOB, injury to fingers \par

## 2023-03-22 ENCOUNTER — TRANSCRIPTION ENCOUNTER (OUTPATIENT)
Age: 58
End: 2023-03-22

## 2023-03-23 ENCOUNTER — TRANSCRIPTION ENCOUNTER (OUTPATIENT)
Age: 58
End: 2023-03-23

## 2023-03-24 ENCOUNTER — TRANSCRIPTION ENCOUNTER (OUTPATIENT)
Age: 58
End: 2023-03-24

## 2023-03-29 ENCOUNTER — TRANSCRIPTION ENCOUNTER (OUTPATIENT)
Age: 58
End: 2023-03-29

## 2023-04-07 ENCOUNTER — TRANSCRIPTION ENCOUNTER (OUTPATIENT)
Age: 58
End: 2023-04-07

## 2023-04-11 ENCOUNTER — TRANSCRIPTION ENCOUNTER (OUTPATIENT)
Age: 58
End: 2023-04-11

## 2023-04-13 ENCOUNTER — TRANSCRIPTION ENCOUNTER (OUTPATIENT)
Age: 58
End: 2023-04-13

## 2023-04-28 ENCOUNTER — TRANSCRIPTION ENCOUNTER (OUTPATIENT)
Age: 58
End: 2023-04-28

## 2023-05-03 ENCOUNTER — APPOINTMENT (OUTPATIENT)
Dept: CARDIOLOGY | Facility: CLINIC | Age: 58
End: 2023-05-03

## 2023-05-11 ENCOUNTER — APPOINTMENT (OUTPATIENT)
Dept: OTOLARYNGOLOGY | Facility: CLINIC | Age: 58
End: 2023-05-11

## 2023-06-06 ENCOUNTER — APPOINTMENT (OUTPATIENT)
Dept: RHEUMATOLOGY | Facility: CLINIC | Age: 58
End: 2023-06-06
Payer: COMMERCIAL

## 2023-06-06 VITALS
BODY MASS INDEX: 22.66 KG/M2 | OXYGEN SATURATION: 98 % | RESPIRATION RATE: 16 BRPM | TEMPERATURE: 97.1 F | HEIGHT: 66 IN | SYSTOLIC BLOOD PRESSURE: 145 MMHG | DIASTOLIC BLOOD PRESSURE: 88 MMHG | HEART RATE: 60 BPM | WEIGHT: 141 LBS

## 2023-06-06 DIAGNOSIS — Z86.39 PERSONAL HISTORY OF OTHER ENDOCRINE, NUTRITIONAL AND METABOLIC DISEASE: ICD-10-CM

## 2023-06-06 PROCEDURE — 99214 OFFICE O/P EST MOD 30 MIN: CPT

## 2023-06-06 RX ORDER — PREDNISONE 50 MG/1
50 TABLET ORAL DAILY
Qty: 5 | Refills: 0 | Status: DISCONTINUED | COMMUNITY
Start: 2023-03-07 | End: 2023-06-06

## 2023-06-06 RX ORDER — PREDNISONE 2.5 MG/1
2.5 TABLET ORAL
Qty: 30 | Refills: 0 | Status: DISCONTINUED | COMMUNITY
Start: 2023-03-21 | End: 2023-06-06

## 2023-06-06 NOTE — PHYSICAL EXAM
[General Appearance - Alert] : alert [General Appearance - In No Acute Distress] : in no acute distress [Sclera] : the sclera and conjunctiva were normal [PERRL With Normal Accommodation] : pupils were equal in size, round, and reactive to light [Extraocular Movements] : extraocular movements were intact [Neck Appearance] : the appearance of the neck was normal [Neck Cervical Mass (___cm)] : no neck mass was observed [Jugular Venous Distention Increased] : there was no jugular-venous distention [Thyroid Diffuse Enlargement] : the thyroid was not enlarged [Thyroid Nodule] : there were no palpable thyroid nodules [Skin Color & Pigmentation] : normal skin color and pigmentation [] : no rash [Oriented To Time, Place, And Person] : oriented to person, place, and time [Impaired Insight] : insight and judgment were intact [FreeTextEntry1] : 4/5 muscle strength in all 4 extremities with pain on palpation and rom of muscles. no synovitis.

## 2023-06-06 NOTE — ASSESSMENT
[FreeTextEntry1] : 58yF with necrotizing myopathy, positive anti SRP, + RF, + RNP.  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 here for follow up for necrotizing myopathy/UCTD with flare with myalgia and weakness \par \par #necrotizing myopathy \par - flare today with myalgia, muscle tenderness and weakness\par - add short course of steroids \par - Will check laboratory tests to look for markers of disease activity and also to assess for medication toxicity.\par - for now continue MMF 3g daily  but consider changing to another IS therapy if has persistent dalres. \par \par #mononeuropathy of UE b/l\par - continue gabapentin \par - follow up neurology \par \par #secondary fibromyalgia - poor sleep hygiene, multiple tender points, anxiety\par - not currently active\par \par #structural back disease - better with injections \par - not currently active \par # GERD\par # Fatigue - NOS\par \par HCM \par Prevnar in dec 2019 and pneumovax in march 2020\par Moderna x 2  (2-2021) and booster in 8-2021  and to get booster in fall 2022 (bivalent)\par Flu shot fall 2022 \par \par  [facility]\par DeXA 4-2022 - normal \par Continue calcium total 1200 mg\par \par More than 50% of the encounter was spent counselling the patient on differential, workup, disease course, and treatment/management.   Education was provided to the patient during this encounter. All questions and concerns were answered and addressed in detail. The patient verbalized understanding and agreed to plan\par \par Patient has been instructed to call for an earlier appointment if new symptoms develop \par Patient has been instructed to make a follow up appointment 6 weeks. \par \par

## 2023-06-06 NOTE — DATA REVIEWED
[FreeTextEntry1] : Laboratory and radiology studies that were personally reviewed at today's visit are summarized in above and below:\par CT chest ():  diffuse ree in bud nodules and GGO in the lower lungs and mild centrilobular emphysema.   \par Pulm note (12-28-22)  stable pf,t symtomatic improvement in ARAGON, no ILD On CT chest, DLCO improved on PFT.  \par Cards (12-20-22):  improved BP with change in meds. \par MRI neck (9-2022):  multilevel spondylosis \par PMR note - RAHEEM with relief. \par dexa 4-2022:  Normal \par ortho note (5-16-22):  reactive cervical myofascial pain - trigger point injections \par 7.20.16:  RF=50, ANJUM = 1:80, INL=252, CK 1793\par ortho note (8-22-22) cervical myofascial pain tx with trigger point injections.

## 2023-06-06 NOTE — HISTORY OF PRESENT ILLNESS
[de-identified] : 3/21/2023  [FreeTextEntry1] : 10 days ago she developed achiness everywhere without swelling in the joints and 9 days ago she had a cough with nasal drip which resolved after 7 days.  The achiness has persisted.  she did take tylenol and advil two nights for the achiness but she didn't need it the other nights, \par no oral or nasal ulcers\par  no rashes \par no chest pain\par no shortness of breath \par mild hair loss at the edges \par no fevers, chills, sweats\par muscle achienss in the hands, arms, upper arms, upper legs, lower legs, feet\par lapse in MMF in march 2023 due to insurance issues but back on MMF now

## 2023-06-07 ENCOUNTER — TRANSCRIPTION ENCOUNTER (OUTPATIENT)
Age: 58
End: 2023-06-07

## 2023-06-07 LAB
25(OH)D3 SERPL-MCNC: 35 NG/ML
ALBUMIN SERPL ELPH-MCNC: 5 G/DL
ALBUMIN SERPL ELPH-MCNC: 5 G/DL
ALP BLD-CCNC: 73 U/L
ALP BLD-CCNC: 73 U/L
ALT SERPL-CCNC: 10 U/L
ALT SERPL-CCNC: 10 U/L
ANION GAP SERPL CALC-SCNC: 15 MMOL/L
ANION GAP SERPL CALC-SCNC: 15 MMOL/L
AST SERPL-CCNC: 14 U/L
AST SERPL-CCNC: 14 U/L
BILIRUB SERPL-MCNC: 0.9 MG/DL
BILIRUB SERPL-MCNC: 0.9 MG/DL
BUN SERPL-MCNC: 11 MG/DL
BUN SERPL-MCNC: 11 MG/DL
C3 SERPL-MCNC: 115 MG/DL
C4 SERPL-MCNC: 51 MG/DL
CALCIUM SERPL-MCNC: 9.6 MG/DL
CALCIUM SERPL-MCNC: 9.6 MG/DL
CHLORIDE SERPL-SCNC: 104 MMOL/L
CHLORIDE SERPL-SCNC: 104 MMOL/L
CK SERPL-CCNC: 126 U/L
CO2 SERPL-SCNC: 24 MMOL/L
CO2 SERPL-SCNC: 24 MMOL/L
CREAT SERPL-MCNC: 0.72 MG/DL
CREAT SERPL-MCNC: 0.72 MG/DL
CRP SERPL-MCNC: <3 MG/L
DSDNA AB SER-ACNC: <12 IU/ML
EGFR: 96 ML/MIN/1.73M2
EGFR: 96 ML/MIN/1.73M2
ENA RNP AB SER IA-ACNC: 1.8 AL
ENA SM AB SER IA-ACNC: <0.2 AL
ENA SS-A AB SER IA-ACNC: 0.5 AL
ENA SS-B AB SER IA-ACNC: <0.2 AL
ERYTHROCYTE [SEDIMENTATION RATE] IN BLOOD BY WESTERGREN METHOD: 23 MM/HR
GLUCOSE SERPL-MCNC: 94 MG/DL
HCT VFR BLD CALC: 42.5 %
HGB BLD-MCNC: 13.3 G/DL
MCHC RBC-ENTMCNC: 27.7 PG
MCHC RBC-ENTMCNC: 31.3 GM/DL
MCV RBC AUTO: 88.4 FL
PLATELET # BLD AUTO: NORMAL K/UL
POTASSIUM SERPL-SCNC: 3.8 MMOL/L
POTASSIUM SERPL-SCNC: 3.8 MMOL/L
PROT SERPL-MCNC: 7.3 G/DL
PROT SERPL-MCNC: 7.3 G/DL
RBC # BLD: 4.81 M/UL
RBC # FLD: 15.1 %
SODIUM SERPL-SCNC: 143 MMOL/L
SODIUM SERPL-SCNC: 143 MMOL/L
WBC # FLD AUTO: 6.35 K/UL

## 2023-06-08 ENCOUNTER — TRANSCRIPTION ENCOUNTER (OUTPATIENT)
Age: 58
End: 2023-06-08

## 2023-06-08 LAB
M TB IFN-G BLD-IMP: NEGATIVE
QUANTIFERON TB PLUS MITOGEN MINUS NIL: >10 IU/ML
QUANTIFERON TB PLUS NIL: 0.02 IU/ML
QUANTIFERON TB PLUS TB1 MINUS NIL: 0 IU/ML
QUANTIFERON TB PLUS TB2 MINUS NIL: 0 IU/ML

## 2023-06-13 ENCOUNTER — TRANSCRIPTION ENCOUNTER (OUTPATIENT)
Age: 58
End: 2023-06-13

## 2023-06-19 NOTE — ASU DISCHARGE PLAN (ADULT/PEDIATRIC). - =======================================================================
Rx Refill Note  Requested Prescriptions     Pending Prescriptions Disp Refills    ezetimibe (ZETIA) 10 MG tablet [Pharmacy Med Name: EZETIMIBE 10MG TABLETS] 90 tablet 1     Sig: TAKE 1 TABLET BY MOUTH EVERY NIGHT      Last office visit with prescribing clinician: 2/13/2023   Last telemedicine visit with prescribing clinician: Visit date not found   Next office visit with prescribing clinician: 8/14/2023                         Would you like a call back once the refill request has been completed: [] Yes [] No    If the office needs to give you a call back, can they leave a voicemail: [] Yes [] No    Lori Velez LPN  06/19/23, 10:06 EDT  
Statement Selected

## 2023-06-29 ENCOUNTER — NON-APPOINTMENT (OUTPATIENT)
Age: 58
End: 2023-06-29

## 2023-06-29 LAB

## 2023-07-07 ENCOUNTER — APPOINTMENT (OUTPATIENT)
Dept: INTERNAL MEDICINE | Facility: CLINIC | Age: 58
End: 2023-07-07
Payer: COMMERCIAL

## 2023-07-07 VITALS — SYSTOLIC BLOOD PRESSURE: 134 MMHG | DIASTOLIC BLOOD PRESSURE: 70 MMHG

## 2023-07-07 VITALS
HEART RATE: 74 BPM | DIASTOLIC BLOOD PRESSURE: 70 MMHG | OXYGEN SATURATION: 97 % | SYSTOLIC BLOOD PRESSURE: 144 MMHG | WEIGHT: 142 LBS | BODY MASS INDEX: 22.82 KG/M2 | HEIGHT: 66 IN | RESPIRATION RATE: 16 BRPM | TEMPERATURE: 97.9 F

## 2023-07-07 DIAGNOSIS — R06.09 OTHER FORMS OF DYSPNEA: ICD-10-CM

## 2023-07-07 DIAGNOSIS — G47.00 INSOMNIA, UNSPECIFIED: ICD-10-CM

## 2023-07-07 DIAGNOSIS — Z87.898 PERSONAL HISTORY OF OTHER SPECIFIED CONDITIONS: ICD-10-CM

## 2023-07-07 DIAGNOSIS — M25.511 PAIN IN RIGHT SHOULDER: ICD-10-CM

## 2023-07-07 PROCEDURE — 99214 OFFICE O/P EST MOD 30 MIN: CPT

## 2023-07-08 PROBLEM — Z87.898 HISTORY OF POSTNASAL DRIP: Status: RESOLVED | Noted: 2020-12-29 | Resolved: 2023-07-08

## 2023-07-08 PROBLEM — R06.09 DYSPNEA ON EXERTION: Status: RESOLVED | Noted: 2022-12-28 | Resolved: 2023-07-08

## 2023-07-08 PROBLEM — G47.00 INSOMNIA, PERSISTENT: Status: RESOLVED | Noted: 2020-09-18 | Resolved: 2023-07-08

## 2023-07-08 PROBLEM — Z87.898 HISTORY OF EPISTAXIS: Status: RESOLVED | Noted: 2023-03-09 | Resolved: 2023-07-08

## 2023-07-08 PROBLEM — M25.511 SHOULDER PAIN, RIGHT: Status: ACTIVE | Noted: 2018-03-27

## 2023-07-08 NOTE — HISTORY OF PRESENT ILLNESS
[FreeTextEntry1] : \par f/u [de-identified] : \par 59 yo F pmhx HTN, HLD, preDM, GERD, empysemia, MCTD, necrotizing myopathy, kidney stone, vit d insuff, B12 def for f/u\par \par Last seen 3/7/23 for CPE.\par \par Feeling well.\par Needs med refill.\par Not fasting- wants slip\par \par Reports is socially distancing and using precautions for covid prevention.\par Denies sick or covid positive contacts.\par -hx Moderna vaccine series, last booster 9/22\par \par c/o worsening anxiety x 2 weeks, feels is 2/2 job related issues (personality issues with colleagues)- is looking for new job currently\par denies panic attacks, depression, HI or SI.  \par -on clonazepam prn, helps - used infrequently\par -Therapy pending, has resources\par -hx onset 2/2 mom passing away in 4/20 (was at a NH and endstage COPD)\par -sleeping well\par \par hx right neck/shoulder pain- reports improved\par -followed by ortho, advised PT (pending, doing on own), states advised MRI but denied by insurance \par \par hx nasal irritation, epistaxis (last 3/23)- using nasal saline and Vaseline with no sx recurrence since 3/23\par -seen by KERRY 3/23 with fiberoptic exam done, conservative measures opted on by pt.  Advised f/u in 2 mo- pending\par \par HTN, hx chest discomfort - reports has mostly resolved - no recent\par -on med regimen, adherent.  Off HCTZ since 12/22 per cardio\par -home BP:  no recent\par -seen by cardio, Dr. Rivero, 12/22 felt dizziness likely 2/2 HCTZ given low BPs noted and advised Rx d/c.  Told CP unlikely cardiac.  F/u pending.\par -hx optho eval 10/22, told had nl dilated exam and to f/u in 1 yr\par \par exercise: has , meet 0-2x/wk, doing strength training x 1 hr, walks ~ 10 k steps/d - done w/o sx's or limitations\par \par hx ARAGON, hx abnl CT chest 11/22-\par -followed by pulm, last seen 12/22 for repeat PFTs, told stable and no meds/intervention advised for hx mild emphysema.  Planned to f/u in 1 yr for repeat CT chest then. \par \par hx GERD- controlled with PPI\par -followed by GI (? name, [facility]) last seen 11/22 with PPI started, plans to f/u soon re: c-scope\par \par Reports nl appetite and BMs.  \par Denies GI complaints.\par \par hx MCTD, necrotizing myopathy (hx muscle bx), hx imbalance (improved)/ /HAs (resolved)- reports improved, just finished prednisone course\par -followed by rheum, seen 6/23, noted +flare with steroid course given and labs done.  Has f/u 7/27/23.\par -reports hx improved imbalance/HAs (hx negative CTH 3/20)- notes HAs resolved since 5/20.  States saw neuro 3/21, advised to continue gabapentin and physical therapy \par \par Denies  complaints\par -hx GYN f/u 8/22 with negative PAP.\par -hx right kidney stone, dx'd by  7/22 when seen for hip pain- felt not cause of sx and no intervention advised.  Planned to f/u in 1 yr.\par

## 2023-07-08 NOTE — REVIEW OF SYSTEMS
[Negative] : Neurological [FreeTextEntry4] : see HPI [FreeTextEntry5] : see HPI [FreeTextEntry9] : see HPI [de-identified] : see HPI

## 2023-07-08 NOTE — PHYSICAL EXAM
[No Acute Distress] : no acute distress [Well-Appearing] : well-appearing [Normal Sclera/Conjunctiva] : normal sclera/conjunctiva [PERRL] : pupils equal round and reactive to light [EOMI] : extraocular movements intact [Normal Outer Ear/Nose] : the outer ears and nose were normal in appearance [Normal Oropharynx] : the oropharynx was normal [Normal Nasal Mucosa] : the nasal mucosa was normal [No Lymphadenopathy] : no lymphadenopathy [Supple] : supple [Thyroid Normal, No Nodules] : the thyroid was normal and there were no nodules present [No Respiratory Distress] : no respiratory distress  [Clear to Auscultation] : lungs were clear to auscultation bilaterally [Normal Rate] : normal rate  [Regular Rhythm] : with a regular rhythm [Normal S1, S2] : normal S1 and S2 [No Murmur] : no murmur heard [Pedal Pulses Present] : the pedal pulses are present [No Edema] : there was no peripheral edema [Soft] : abdomen soft [Non Tender] : non-tender [Non-distended] : non-distended [No HSM] : no HSM [Normal Supraclavicular Nodes] : no supraclavicular lymphadenopathy [Normal Anterior Cervical Nodes] : no anterior cervical lymphadenopathy [No CVA Tenderness] : no CVA  tenderness [No Spinal Tenderness] : no spinal tenderness [No Joint Swelling] : no joint swelling [No Focal Deficits] : no focal deficits [No Rash] : no rash [Normal Gait] : normal gait [Normal Affect] : the affect was normal [Alert and Oriented x3] : oriented to person, place, and time [Normal Posterior Cervical Nodes] : no posterior cervical lymphadenopathy [de-identified] : no visible d/c or bleeding in nares; no sinus tenderness [de-identified] : right shoulder: FROM with min pain on extension reported, no focal rash/warmth, nl , sensation grossly intact

## 2023-07-08 NOTE — HEALTH RISK ASSESSMENT
[0] : 2) Feeling down, depressed, or hopeless: Not at all (0) [PHQ-2 Negative - No further assessment needed] : PHQ-2 Negative - No further assessment needed [[id number]] : 0

## 2023-07-08 NOTE — ASSESSMENT
[FreeTextEntry1] : \par 59 yo F pmhx HTN, HLD, preDM, GERD, empysemia, MCTD, necrotizing myopathy, kidney stone, vit d insuff, B12 def for f/u\par \par \par right neck/shoulder pain- reports better\par -followed by ortho, advised PT (pending), states advised MRI but denied by insurance\par \par hx nasal irritation, epistaxis- last onset 3/23\par -seen by KERRY 3/23 with fiberoptic exam done, conservative measures opted on by pt.  Advised f/u in 2 mo- pending\par -cont nasal saline and Vaseline prn\par -encouraged to continue off Flonase\par -avoidance of aggressive nasal stimulation advised\par \par HTN, atypical CP, hx abnl EKG- resolved\par -on amlodipine- benazepril, off HCTZ 12.5 per cardio in 12/22 2/2 dizziness\par -11/20 exercise stress test: normal study\par -3/22 echo: EF 60-65%, mild MR/TR, no LV fxn and size (no chg x/w 11/20)\par -3/23 UA negative, urine prt/crt wnl\par -3/23 cmp wnl, cbc wnl, except plt clumped\par -seen by cardio, Dr. Rivero, 12/22 felt dizziness likely 2/2 HCTZ given low BPs noted and advised Rx d/c.  Told CP unlikely cardiac.  F/u pending.\par -followed by optho, hx eval 10/22 with nl dilated exam- yearly advised\par -low salt diet advised\par -advised prompt medical eval if sx's recur\par \par hx MCTD, necrotizing myopathy (hx muscle bx), hx imbalance/HAs (resolved)- improved s/p recent flare, off prednisone now\par -followed by rheum, seen 6/23, noted +flare with steroid course given and labs done.  Has f/u 7/27/23.\par -hx serum sickness with IVIg, avoiding during covid pandemic as high risk with use per pt\par -on CellCept (restarted fall 2019)\par -on Rituxan since 4/19; Neurontin, etodolac since 9/20 (when diclofenac d/c'd by rheum)\par -hx PT \par -followed by neuro, seen 3/21, advised to continue gabapentin and physical therapy and follow-up pending\par -hx nl optho eval 10/22\par -6/23 cmp wnl, cbc wnl, except plt clumped\par -check cbc, plt in blue top\par \par hx ARAGON, emphysema, abnl CT chest- resolved per pt\par -followed by pulm, last seen 12/22 for repeat PFTs, told stable and no meds/intervention advised for hx mild emphysema.  Planned to f/u in 1 yr for repeat CT chest then. \par -followed by cardio\par \par preDM- 3/23 A1c 5.8 (was 5.6, hx 5.9 11/21) \par -ADA diet, exercise encouraged\par -check A1c\par \par HLD- 3/23 Tchol 247 TG 53  \par -low fat diet, exercise encouraged\par -check lipids, slip given to do fasting\par \par GERD, IBS/constipation, hx hemorrhoids- controlled\par -followed by GI\par -hx screening colonoscopy 4/18, rec: repeat in 5 yrs (Dr. Prosper Guerra)- plans to f/u soon.  \par -on omeprazole\par -on Metamucil prn\par -on prep H prn\par -asked to forward records\par \par hx right kidney stone- dx'd by  7/22 on w/u of pain- since resolved\par -followed by , seen 7/22 with US done, felt not cause of sx and no intervention advised.  Planned to f/u in 1 yr with , pending\par \par hx stress 2/2 mother's death in 4/20, depression/anxiety a/w grief- reports recent increased anxiety 2/2 job related stress (looking for new job); denies acute sx's\par -declines standing mood medication (ie SSRI)\par -on clonazepam with help- istop checked, refilled.  Risks/benefits/SEs discussed. Advised to avoid concurrent use with other sedating medications due to potential additive effects. Advised of need for visit for further refills.\par - referral and info for [facility] given prior- pending.  Declines visit with in office therapist.\par -info for behavioral health crisis walk-in center given prior (confirms has info)\par -support provided, advised to f/u if sx's worsen\par \par hx vit d def-\par -3/23 level 28\par -on OTC supp\par \par hx B12 def-\par -3/23 B12/folate wnl \par -on MVI\par  \par \par MISC:  Continued social distancing and measure for covid19 prevention encouraged.  \par -hx Moderna vaccine series, last booster 9/22\par \par \par HCM\par -hx CPE 3/23\par -hx negative hep C screening 3/23\par -hx flu shot 1/23\par -hx Tdap 8/18\par -hx prevnar (13) 12/19\par -hx PVX (23) 3/20\par -advised prevnar 20- declines today, wishes to consider in the future\par -advised to check on shingrix insurance coverage and get okay to get from rheum prior to considering\par -hx abnl PAP 8/21 (LSIL), HPV + , s/p colposcopy 10/21 path: +FLEX I, hx negative PAP 8/22.\par -hx negative mammo 4/22, left US 11/22- stable - followed by Dr. Carrera, plans to f/u for Rx's\par -hx nl DEXA 4/22\par -hx screening colonoscopy 4/18, rec: repeat in 5 yrs (Dr. Prosper Guerra)- plans to f/u soon.  Asked to forward records.\par -followed by derm, yearly FBSE advised.  Regular use of sun block for skin cancer prevention advised.\par \par Pt's cell: [phone]\par \par

## 2023-07-15 ENCOUNTER — LABORATORY RESULT (OUTPATIENT)
Age: 58
End: 2023-07-15

## 2023-07-16 LAB
ANION GAP SERPL CALC-SCNC: 14 MMOL/L
BASOPHILS # BLD AUTO: 0.03 K/UL
BASOPHILS NFR BLD AUTO: 0.6 %
BUN SERPL-MCNC: 9 MG/DL
CALCIUM SERPL-MCNC: 9.5 MG/DL
CHLORIDE SERPL-SCNC: 108 MMOL/L
CHOLEST SERPL-MCNC: 235 MG/DL
CO2 SERPL-SCNC: 22 MMOL/L
CREAT SERPL-MCNC: 0.72 MG/DL
EGFR: 96 ML/MIN/1.73M2
EOSINOPHIL # BLD AUTO: 0.06 K/UL
EOSINOPHIL NFR BLD AUTO: 1.1 %
ESTIMATED AVERAGE GLUCOSE: 120 MG/DL
GLUCOSE SERPL-MCNC: 101 MG/DL
HBA1C MFR BLD HPLC: 5.8 %
HCT VFR BLD CALC: 40.5 %
HDLC SERPL-MCNC: 93 MG/DL
HGB BLD-MCNC: 12.4 G/DL
IMM GRANULOCYTES NFR BLD AUTO: 0.2 %
LDLC SERPL CALC-MCNC: 131 MG/DL
LYMPHOCYTES # BLD AUTO: 1.71 K/UL
LYMPHOCYTES NFR BLD AUTO: 32.1 %
MAN DIFF?: NORMAL
MCHC RBC-ENTMCNC: 27.3 PG
MCHC RBC-ENTMCNC: 30.6 GM/DL
MCV RBC AUTO: 89.2 FL
MONOCYTES # BLD AUTO: 0.41 K/UL
MONOCYTES NFR BLD AUTO: 7.7 %
NEUTROPHILS # BLD AUTO: 3.1 K/UL
NEUTROPHILS NFR BLD AUTO: 58.3 %
NONHDLC SERPL-MCNC: 142 MG/DL
PLATELET # BLD AUTO: NORMAL K/UL
PLATELET # PLAS AUTO: 299 K/UL
POTASSIUM SERPL-SCNC: 4.2 MMOL/L
RBC # BLD: 4.54 M/UL
RBC # FLD: 16.3 %
SODIUM SERPL-SCNC: 144 MMOL/L
TRIGL SERPL-MCNC: 63 MG/DL
WBC # FLD AUTO: 5.32 K/UL

## 2023-07-17 ENCOUNTER — NON-APPOINTMENT (OUTPATIENT)
Age: 58
End: 2023-07-17

## 2023-07-27 ENCOUNTER — APPOINTMENT (OUTPATIENT)
Dept: RHEUMATOLOGY | Facility: CLINIC | Age: 58
End: 2023-07-27
Payer: COMMERCIAL

## 2023-07-27 VITALS
DIASTOLIC BLOOD PRESSURE: 74 MMHG | WEIGHT: 143 LBS | BODY MASS INDEX: 22.98 KG/M2 | HEART RATE: 46 BPM | TEMPERATURE: 98.1 F | HEIGHT: 66 IN | OXYGEN SATURATION: 98 % | RESPIRATION RATE: 16 BRPM | SYSTOLIC BLOOD PRESSURE: 120 MMHG

## 2023-07-27 DIAGNOSIS — M25.552 PAIN IN RIGHT HIP: ICD-10-CM

## 2023-07-27 DIAGNOSIS — M25.551 PAIN IN RIGHT HIP: ICD-10-CM

## 2023-07-27 PROCEDURE — 99215 OFFICE O/P EST HI 40 MIN: CPT | Mod: GC

## 2023-07-27 RX ORDER — PREDNISONE 10 MG/1
10 TABLET ORAL
Qty: 190 | Refills: 0 | Status: DISCONTINUED | COMMUNITY
Start: 2023-06-06 | End: 2023-07-27

## 2023-07-27 NOTE — ASSESSMENT
[FreeTextEntry1] : 58yF with necrotizing myopathy, positive anti SRP, + RF, + RNP.  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 here for follow up for necrotizing myopathy/UCTD with flare with myalgia and weakness \par \par #necrotizing myopathy \par - has myalgia, muscle tenderness/weakness, and "burning pain" in extremities, CK/ESR/CRP wnl, SRP has always been "moderately positive", already on cellcept 3g qd\par - 20-30% improvement w/ short course of steroids \par - unclear if this is muscle issue vs neuropathy (as has hx of UE mononeuropathy) vs fibro\par - will obtain MRI b/l thighs as has some weakness on exam, EMG to evaluate for neuropathy, x-ray hips to evaluate for OA/other pathology\par - will increase home gabapenting from 300mg TID to 400mg TID to see if it helps symptoms - advised to hold ~5 days prior to EMG\par - Will check laboratory tests to look for markers of disease activity and also to assess for medication toxicity.\par - for now continue MMF 3g daily  \par \par #mononeuropathy of UE b/l\par - gabapentin as above\par - follow up neurology \par \par #secondary fibromyalgia - poor sleep hygiene, multiple tender points, anxiety\par - plan as above\par \par #structural back disease - better with injections \par - not currently active \par # GERD\par # Fatigue - NOS\par \par HCM \par Prevnar in dec 2019 and pneumovax in march 2020\par Moderna x 2  (2-2021) and booster in 8-2021  and to get booster in fall 2022 (bivalent)\par Flu shot fall 2022 \par \par  [facility]\par DeXA 4-2022 - normal \par Continue calcium total 1200 mg\par \par RTO 6 weeks\par \par Iain Cox\par Rheumatology Fellow\par d/w Dr Jones

## 2023-07-27 NOTE — HISTORY OF PRESENT ILLNESS
[___ Month(s) Ago] : [unfilled] month(s) ago [FreeTextEntry1] : 58 year old female here today for follow up for necrotizing myopathy + SRP, elevated CK (2000s) + RF + RNP\par MCTD/necrotizing myopathy: \par On CellCept d/c in april 2019 and restarted subsequently \par Rituxan on 4-23-19 and 5-16-19\par unable to tolerate IVIg due to serum sickness\par On MMF 1500 mg \par \par *  neuropathy - has numbness in dorsal hands. saw neurology, has mononeuropathy in b/l UE and some loss of reflex in LE. she is taking increased evening dose of gabapentin since 11/2020.\par -> She is currently on gabapentin 300 mg morning, 300 mg afternoon, 300 mg evening and thinks it is helping but it makes her sleepy\par \par *  Rash - no active rash\par \par *  SOB:  onset in May 2018, still has panic attacks.  resolved. \par \par On todays visit, 7/2023: Still has muscle aches/burning on b/l arms, thighs, calves - constant, symmetrical, took prednisone taper 30/20/10/5 weekly, felt 20-30% better. B/l wrist, MCP, PIP pain - hand swelling/stiffness in am ~5 minutes. Taking cellcept 3g, neurontin 300 TID\par \par ROS:\par -admits to occasional dry eyes\par -denies fever, chills, chest pain, sob, alopecia, dry mouth, raynauds, nasal/oral ulcer\par \par

## 2023-07-27 NOTE — DATA REVIEWED
[FreeTextEntry1] : Laboratory and radiology studies that were personally reviewed at today's visit are summarized in above and below:\par CT chest ():  diffuse ree in bud nodules and GGO in the lower lungs and mild centrilobular emphysema.   \par Pulm note (12-28-22)  stable pf,t symtomatic improvement in ARAGON, no ILD On CT chest, DLCO improved on PFT.  \par Cards (12-20-22):  improved BP with change in meds. \par MRI neck (9-2022):  multilevel spondylosis \par PMR note - RAHEEM with relief. \par dexa 4-2022:  Normal \par ortho note (5-16-22):  reactive cervical myofascial pain - trigger point injections \par 7.20.16:  RF=50, ANJUM = 1:80, OEB=941, CK 1793\par ortho note (8-22-22) cervical myofascial pain tx with trigger point injections.

## 2023-07-27 NOTE — REVIEW OF SYSTEMS
[Feeling Poorly] : feeling poorly [Arthralgias] : arthralgias [Joint Pain] : joint pain [Joint Swelling] : joint swelling [Joint Stiffness] : joint stiffness [Limb Pain] : limb pain [Limb Weakness] : limb weakness [Feelings Of Weakness] : feelings of weakness [Negative] : Integumentary [Fever] : no fever [Chills] : no chills [Chest Pain] : no chest pain [Anxiety] : no anxiety [Muscle Weakness] : no muscle weakness [Swollen Glands] : no swollen glands

## 2023-07-27 NOTE — PHYSICAL EXAM
[General Appearance - Alert] : alert [General Appearance - In No Acute Distress] : in no acute distress [Sclera] : the sclera and conjunctiva were normal [Extraocular Movements] : extraocular movements were intact [Neck Cervical Mass (___cm)] : no neck mass was observed [Neck Appearance] : the appearance of the neck was normal [Jugular Venous Distention Increased] : there was no jugular-venous distention [Thyroid Diffuse Enlargement] : the thyroid was not enlarged [Thyroid Nodule] : there were no palpable thyroid nodules [Skin Color & Pigmentation] : normal skin color and pigmentation [Oriented To Time, Place, And Person] : oriented to person, place, and time [Impaired Insight] : insight and judgment were intact [Outer Ear] : the ears and nose were normal in appearance [Respiration, Rhythm And Depth] : normal respiratory rhythm and effort [] : no respiratory distress [Auscultation Breath Sounds / Voice Sounds] : lungs were clear to auscultation bilaterally [Heart Rate And Rhythm] : heart rate was normal and rhythm regular [Heart Sounds] : normal S1 and S2 [Edema] : there was no peripheral edema [Abdomen Soft] : soft [Abdomen Tenderness] : non-tender [Musculoskeletal - Swelling] : no joint swelling seen [FreeTextEntry1] : 4/5 muscle strength in arms/anterior thighs, legs, feet. 3/5 strength in posterior b/l thighs. Very mild/minimal tenderness to palpation of arms, shoulders, trapzius, thighs. no synovitis.

## 2023-07-29 ENCOUNTER — OUTPATIENT (OUTPATIENT)
Dept: OUTPATIENT SERVICES | Facility: HOSPITAL | Age: 58
LOS: 1 days | End: 2023-07-29
Payer: COMMERCIAL

## 2023-07-29 ENCOUNTER — APPOINTMENT (OUTPATIENT)
Dept: RADIOLOGY | Facility: IMAGING CENTER | Age: 58
End: 2023-07-29
Payer: COMMERCIAL

## 2023-07-29 DIAGNOSIS — Z98.51 TUBAL LIGATION STATUS: Chronic | ICD-10-CM

## 2023-07-29 DIAGNOSIS — Z98.89 OTHER SPECIFIED POSTPROCEDURAL STATES: Chronic | ICD-10-CM

## 2023-07-29 DIAGNOSIS — Z98.890 OTHER SPECIFIED POSTPROCEDURAL STATES: Chronic | ICD-10-CM

## 2023-07-29 DIAGNOSIS — G72.89 OTHER SPECIFIED MYOPATHIES: ICD-10-CM

## 2023-07-29 PROCEDURE — 73522 X-RAY EXAM HIPS BI 3-4 VIEWS: CPT

## 2023-07-29 PROCEDURE — 73522 X-RAY EXAM HIPS BI 3-4 VIEWS: CPT | Mod: 26

## 2023-07-31 ENCOUNTER — TRANSCRIPTION ENCOUNTER (OUTPATIENT)
Age: 58
End: 2023-07-31

## 2023-07-31 LAB
ALBUMIN SERPL ELPH-MCNC: 4.7 G/DL
ALBUMIN SERPL ELPH-MCNC: 4.7 G/DL
ALDOLASE SERPL-CCNC: 4 U/L
ALP BLD-CCNC: 63 U/L
ALP BLD-CCNC: 63 U/L
ALT SERPL-CCNC: 10 U/L
ALT SERPL-CCNC: 10 U/L
ANION GAP SERPL CALC-SCNC: 12 MMOL/L
ANION GAP SERPL CALC-SCNC: 12 MMOL/L
AST SERPL-CCNC: 14 U/L
AST SERPL-CCNC: 15 U/L
BILIRUB SERPL-MCNC: 0.8 MG/DL
BILIRUB SERPL-MCNC: 0.8 MG/DL
BUN SERPL-MCNC: 10 MG/DL
BUN SERPL-MCNC: 10 MG/DL
CALCIUM SERPL-MCNC: 9.7 MG/DL
CALCIUM SERPL-MCNC: 9.8 MG/DL
CD16+CD56+ CELLS # BLD: 431 CELLS/UL
CD16+CD56+ CELLS NFR BLD: 21 %
CD19 CELLS NFR BLD: 175 CELLS/UL
CD3 CELLS # BLD: 1421 CELLS/UL
CD3 CELLS NFR BLD: 67 %
CD3+CD4+ CELLS # BLD: 1180 CELLS/UL
CD3+CD4+ CELLS NFR BLD: 54 %
CD3+CD4+ CELLS/CD3+CD8+ CLL SPEC: 4.66 RATIO
CD3+CD8+ CELLS # SPEC: 253 CELLS/UL
CD3+CD8+ CELLS NFR BLD: 12 %
CELLS.CD3-CD19+/CELLS IN BLOOD: 9 %
CHLORIDE SERPL-SCNC: 107 MMOL/L
CHLORIDE SERPL-SCNC: 108 MMOL/L
CK SERPL-CCNC: 158 U/L
CO2 SERPL-SCNC: 24 MMOL/L
CO2 SERPL-SCNC: 25 MMOL/L
CREAT SERPL-MCNC: 0.66 MG/DL
CREAT SERPL-MCNC: 0.69 MG/DL
CRP SERPL-MCNC: <3 MG/L
EGFR: 100 ML/MIN/1.73M2
EGFR: 99 ML/MIN/1.73M2
ERYTHROCYTE [SEDIMENTATION RATE] IN BLOOD BY WESTERGREN METHOD: 19 MM/HR
GLUCOSE SERPL-MCNC: 88 MG/DL
GLUCOSE SERPL-MCNC: 91 MG/DL
IGA SER QL IEP: 178 MG/DL
IGG SER QL IEP: 992 MG/DL
IGM SER QL IEP: 54 MG/DL
MYOGLOBIN SERPL-MCNC: 27 NG/ML
POTASSIUM SERPL-SCNC: 4.1 MMOL/L
POTASSIUM SERPL-SCNC: 4.4 MMOL/L
PROT SERPL-MCNC: 7.1 G/DL
PROT SERPL-MCNC: 7.2 G/DL
SODIUM SERPL-SCNC: 143 MMOL/L
SODIUM SERPL-SCNC: 145 MMOL/L

## 2023-08-02 ENCOUNTER — RESULT REVIEW (OUTPATIENT)
Age: 58
End: 2023-08-02

## 2023-08-04 ENCOUNTER — TRANSCRIPTION ENCOUNTER (OUTPATIENT)
Age: 58
End: 2023-08-04

## 2023-08-15 ENCOUNTER — RESULT REVIEW (OUTPATIENT)
Age: 58
End: 2023-08-15

## 2023-08-15 ENCOUNTER — OUTPATIENT (OUTPATIENT)
Dept: OUTPATIENT SERVICES | Facility: HOSPITAL | Age: 58
LOS: 1 days | End: 2023-08-15
Payer: COMMERCIAL

## 2023-08-15 ENCOUNTER — APPOINTMENT (OUTPATIENT)
Dept: MRI IMAGING | Facility: CLINIC | Age: 58
End: 2023-08-15
Payer: COMMERCIAL

## 2023-08-15 DIAGNOSIS — Z00.8 ENCOUNTER FOR OTHER GENERAL EXAMINATION: ICD-10-CM

## 2023-08-15 DIAGNOSIS — Z98.89 OTHER SPECIFIED POSTPROCEDURAL STATES: Chronic | ICD-10-CM

## 2023-08-15 DIAGNOSIS — Z98.51 TUBAL LIGATION STATUS: Chronic | ICD-10-CM

## 2023-08-15 DIAGNOSIS — Z98.890 OTHER SPECIFIED POSTPROCEDURAL STATES: Chronic | ICD-10-CM

## 2023-08-15 DIAGNOSIS — G72.89 OTHER SPECIFIED MYOPATHIES: ICD-10-CM

## 2023-08-15 PROCEDURE — 73718 MRI LOWER EXTREMITY W/O DYE: CPT | Mod: 26,RT

## 2023-08-15 PROCEDURE — 73718 MRI LOWER EXTREMITY W/O DYE: CPT

## 2023-08-18 ENCOUNTER — TRANSCRIPTION ENCOUNTER (OUTPATIENT)
Age: 58
End: 2023-08-18

## 2023-08-24 ENCOUNTER — TRANSCRIPTION ENCOUNTER (OUTPATIENT)
Age: 58
End: 2023-08-24

## 2023-08-26 ENCOUNTER — TRANSCRIPTION ENCOUNTER (OUTPATIENT)
Age: 58
End: 2023-08-26

## 2023-08-27 ENCOUNTER — TRANSCRIPTION ENCOUNTER (OUTPATIENT)
Age: 58
End: 2023-08-27

## 2023-08-28 ENCOUNTER — TRANSCRIPTION ENCOUNTER (OUTPATIENT)
Age: 58
End: 2023-08-28

## 2023-08-28 ENCOUNTER — NON-APPOINTMENT (OUTPATIENT)
Age: 58
End: 2023-08-28

## 2023-08-29 ENCOUNTER — APPOINTMENT (OUTPATIENT)
Dept: OBGYN | Facility: CLINIC | Age: 58
End: 2023-08-29
Payer: COMMERCIAL

## 2023-08-29 VITALS
HEART RATE: 48 BPM | WEIGHT: 140 LBS | SYSTOLIC BLOOD PRESSURE: 138 MMHG | DIASTOLIC BLOOD PRESSURE: 80 MMHG | HEIGHT: 66 IN | BODY MASS INDEX: 22.5 KG/M2

## 2023-08-29 DIAGNOSIS — Z01.419 ENCOUNTER FOR GYNECOLOGICAL EXAMINATION (GENERAL) (ROUTINE) W/OUT ABNORMAL FINDINGS: ICD-10-CM

## 2023-08-29 PROCEDURE — 99396 PREV VISIT EST AGE 40-64: CPT

## 2023-08-29 NOTE — HISTORY OF PRESENT ILLNESS
[FreeTextEntry1] : 58 year old P2 LMP 2014 presents for annual gyn visit.  She feels well and has no complaints. She denies PMB, itching, burning, abnormal discharge, or issues with urination or bowel movements.  HPV pos 2022 [Patient reported bone density results were normal] : Patient reported bone density results were normal [Y] : Patient is sexually active [Mammogramdate] : 2023 [PapSmeardate] : 2022 [BoneDensityDate] : 2023 [ColonoscopyDate] : 01/14

## 2023-08-29 NOTE — PLAN
[FreeTextEntry1] : 57 year old P2 LMP 2014 presents for annual gyn visit.\par  \par  HPV/Pap performed today\par  RTO 1 year for annual

## 2023-08-31 ENCOUNTER — TRANSCRIPTION ENCOUNTER (OUTPATIENT)
Age: 58
End: 2023-08-31

## 2023-09-08 ENCOUNTER — APPOINTMENT (OUTPATIENT)
Dept: ORTHOPEDIC SURGERY | Facility: CLINIC | Age: 58
End: 2023-09-08
Payer: COMMERCIAL

## 2023-09-08 VITALS — HEIGHT: 66 IN | BODY MASS INDEX: 22.66 KG/M2 | WEIGHT: 141 LBS

## 2023-09-08 DIAGNOSIS — M54.12 RADICULOPATHY, CERVICAL REGION: ICD-10-CM

## 2023-09-08 PROCEDURE — 99214 OFFICE O/P EST MOD 30 MIN: CPT | Mod: 25

## 2023-09-08 PROCEDURE — 20553 NJX 1/MLT TRIGGER POINTS 3/>: CPT | Mod: LT

## 2023-09-12 PROBLEM — M54.12 CERVICAL RADICULOPATHY: Status: ACTIVE | Noted: 2022-08-22

## 2023-09-14 ENCOUNTER — LABORATORY RESULT (OUTPATIENT)
Age: 58
End: 2023-09-14

## 2023-09-14 ENCOUNTER — APPOINTMENT (OUTPATIENT)
Dept: RHEUMATOLOGY | Facility: CLINIC | Age: 58
End: 2023-09-14
Payer: COMMERCIAL

## 2023-09-14 VITALS
OXYGEN SATURATION: 98 % | HEART RATE: 61 BPM | RESPIRATION RATE: 16 BRPM | DIASTOLIC BLOOD PRESSURE: 81 MMHG | BODY MASS INDEX: 22.5 KG/M2 | TEMPERATURE: 97.1 F | SYSTOLIC BLOOD PRESSURE: 142 MMHG | HEIGHT: 66 IN | WEIGHT: 140 LBS

## 2023-09-14 DIAGNOSIS — Z23 ENCOUNTER FOR IMMUNIZATION: ICD-10-CM

## 2023-09-14 PROCEDURE — 90471 IMMUNIZATION ADMIN: CPT

## 2023-09-14 PROCEDURE — 99215 OFFICE O/P EST HI 40 MIN: CPT | Mod: 25

## 2023-09-14 PROCEDURE — 90686 IIV4 VACC NO PRSV 0.5 ML IM: CPT

## 2023-09-17 ENCOUNTER — TRANSCRIPTION ENCOUNTER (OUTPATIENT)
Age: 58
End: 2023-09-17

## 2023-09-17 LAB
ALBUMIN SERPL ELPH-MCNC: 4.7 G/DL
ALP BLD-CCNC: 66 U/L
ALT SERPL-CCNC: 10 U/L
ANION GAP SERPL CALC-SCNC: 12 MMOL/L
AST SERPL-CCNC: 17 U/L
BASOPHILS # BLD AUTO: 0.03 K/UL
BASOPHILS NFR BLD AUTO: 0.4 %
BILIRUB SERPL-MCNC: 0.6 MG/DL
BUN SERPL-MCNC: 12 MG/DL
C3 SERPL-MCNC: 109 MG/DL
C4 SERPL-MCNC: 46 MG/DL
CALCIUM SERPL-MCNC: 9.9 MG/DL
CHLORIDE SERPL-SCNC: 104 MMOL/L
CK SERPL-CCNC: 122 U/L
CO2 SERPL-SCNC: 25 MMOL/L
CREAT SERPL-MCNC: 0.67 MG/DL
EGFR: 100 ML/MIN/1.73M2
EOSINOPHIL # BLD AUTO: 0.05 K/UL
EOSINOPHIL NFR BLD AUTO: 0.7 %
ERYTHROCYTE [SEDIMENTATION RATE] IN BLOOD BY WESTERGREN METHOD: 41 MM/HR
HCT VFR BLD CALC: 42.6 %
HGB BLD-MCNC: 13.6 G/DL
IMM GRANULOCYTES NFR BLD AUTO: 0.1 %
LYMPHOCYTES # BLD AUTO: 2.05 K/UL
LYMPHOCYTES NFR BLD AUTO: 29.8 %
MAN DIFF?: NORMAL
MCHC RBC-ENTMCNC: 28.3 PG
MCHC RBC-ENTMCNC: 31.9 GM/DL
MCV RBC AUTO: 88.6 FL
MONOCYTES # BLD AUTO: 0.34 K/UL
MONOCYTES NFR BLD AUTO: 4.9 %
NEUTROPHILS # BLD AUTO: 4.4 K/UL
NEUTROPHILS NFR BLD AUTO: 64.1 %
PLATELET # BLD AUTO: NORMAL K/UL
POTASSIUM SERPL-SCNC: 4.2 MMOL/L
PROT SERPL-MCNC: 7.3 G/DL
RBC # BLD: 4.81 M/UL
RBC # FLD: 14.4 %
SODIUM SERPL-SCNC: 142 MMOL/L
WBC # FLD AUTO: 6.88 K/UL

## 2023-10-20 ENCOUNTER — APPOINTMENT (OUTPATIENT)
Dept: INTERNAL MEDICINE | Facility: CLINIC | Age: 58
End: 2023-10-20

## 2023-10-25 RX ORDER — CYCLOBENZAPRINE HYDROCHLORIDE 5 MG/1
5 TABLET, FILM COATED ORAL
Qty: 28 | Refills: 1 | Status: ACTIVE | COMMUNITY
Start: 2023-09-08 | End: 1900-01-01

## 2023-10-26 ENCOUNTER — NON-APPOINTMENT (OUTPATIENT)
Age: 58
End: 2023-10-26

## 2023-10-26 ENCOUNTER — TRANSCRIPTION ENCOUNTER (OUTPATIENT)
Age: 58
End: 2023-10-26

## 2023-10-27 LAB
CYTOLOGY CVX/VAG DOC THIN PREP: NORMAL
HPV HIGH+LOW RISK DNA PNL CVX: NOT DETECTED

## 2023-10-30 ENCOUNTER — TRANSCRIPTION ENCOUNTER (OUTPATIENT)
Age: 58
End: 2023-10-30

## 2023-11-01 ENCOUNTER — RX RENEWAL (OUTPATIENT)
Age: 58
End: 2023-11-01

## 2023-11-01 LAB
HBV SURFACE AB SERPL IA-ACNC: 36.3 MIU/ML
M TB IFN-G BLD-IMP: NEGATIVE
MEV IGG FLD QL IA: >300 AU/ML
MEV IGG+IGM SER-IMP: POSITIVE
MUV AB SER-ACNC: POSITIVE
MUV IGG SER QL IA: 41 AU/ML
QUANTIFERON TB PLUS MITOGEN MINUS NIL: 9.4 IU/ML
QUANTIFERON TB PLUS NIL: 0.02 IU/ML
QUANTIFERON TB PLUS TB1 MINUS NIL: 0 IU/ML
QUANTIFERON TB PLUS TB2 MINUS NIL: 0 IU/ML
RUBV IGG FLD-ACNC: 17.2 INDEX
RUBV IGG SER-IMP: POSITIVE
VZV AB TITR SER: POSITIVE
VZV IGG SER IF-ACNC: 1161 INDEX

## 2023-11-07 ENCOUNTER — TRANSCRIPTION ENCOUNTER (OUTPATIENT)
Age: 58
End: 2023-11-07

## 2023-11-08 ENCOUNTER — NON-APPOINTMENT (OUTPATIENT)
Age: 58
End: 2023-11-08

## 2023-11-08 ENCOUNTER — APPOINTMENT (OUTPATIENT)
Dept: ORTHOPEDIC SURGERY | Facility: CLINIC | Age: 58
End: 2023-11-08
Payer: COMMERCIAL

## 2023-11-08 PROCEDURE — 99214 OFFICE O/P EST MOD 30 MIN: CPT

## 2023-11-08 PROCEDURE — 73050 X-RAY EXAM OF SHOULDERS: CPT

## 2023-11-08 PROCEDURE — 71130 X-RAY STRENOCLAVIC JT 3/>VWS: CPT

## 2023-11-08 RX ORDER — DICLOFENAC SODIUM 1% 10 MG/G
1 GEL TOPICAL
Qty: 100 | Refills: 0 | Status: ACTIVE | COMMUNITY
Start: 2023-11-08 | End: 1900-01-01

## 2023-11-09 ENCOUNTER — APPOINTMENT (OUTPATIENT)
Dept: RADIOLOGY | Facility: CLINIC | Age: 58
End: 2023-11-09
Payer: COMMERCIAL

## 2023-11-09 ENCOUNTER — APPOINTMENT (OUTPATIENT)
Dept: INTERNAL MEDICINE | Facility: CLINIC | Age: 58
End: 2023-11-09
Payer: COMMERCIAL

## 2023-11-09 ENCOUNTER — OUTPATIENT (OUTPATIENT)
Dept: OUTPATIENT SERVICES | Facility: HOSPITAL | Age: 58
LOS: 1 days | End: 2023-11-09
Payer: COMMERCIAL

## 2023-11-09 VITALS
DIASTOLIC BLOOD PRESSURE: 78 MMHG | HEART RATE: 75 BPM | WEIGHT: 141 LBS | OXYGEN SATURATION: 96 % | SYSTOLIC BLOOD PRESSURE: 138 MMHG | TEMPERATURE: 98.5 F | RESPIRATION RATE: 16 BRPM | BODY MASS INDEX: 22.66 KG/M2 | HEIGHT: 66 IN

## 2023-11-09 DIAGNOSIS — Z98.51 TUBAL LIGATION STATUS: Chronic | ICD-10-CM

## 2023-11-09 DIAGNOSIS — Z98.890 OTHER SPECIFIED POSTPROCEDURAL STATES: Chronic | ICD-10-CM

## 2023-11-09 DIAGNOSIS — R05.9 COUGH, UNSPECIFIED: ICD-10-CM

## 2023-11-09 DIAGNOSIS — Z98.89 OTHER SPECIFIED POSTPROCEDURAL STATES: Chronic | ICD-10-CM

## 2023-11-09 PROCEDURE — 71046 X-RAY EXAM CHEST 2 VIEWS: CPT

## 2023-11-09 PROCEDURE — 71046 X-RAY EXAM CHEST 2 VIEWS: CPT | Mod: 26

## 2023-11-09 PROCEDURE — 99214 OFFICE O/P EST MOD 30 MIN: CPT

## 2023-11-10 LAB
INFLUENZA A RESULT: NOT DETECTED
INFLUENZA B RESULT: NOT DETECTED
RESP SYN VIRUS RESULT: NOT DETECTED
SARS-COV-2 RESULT: NOT DETECTED

## 2023-11-13 ENCOUNTER — TRANSCRIPTION ENCOUNTER (OUTPATIENT)
Age: 58
End: 2023-11-13

## 2023-12-05 ENCOUNTER — TRANSCRIPTION ENCOUNTER (OUTPATIENT)
Age: 58
End: 2023-12-05

## 2023-12-07 ENCOUNTER — TRANSCRIPTION ENCOUNTER (OUTPATIENT)
Age: 58
End: 2023-12-07

## 2023-12-12 ENCOUNTER — APPOINTMENT (OUTPATIENT)
Dept: RHEUMATOLOGY | Facility: CLINIC | Age: 58
End: 2023-12-12
Payer: COMMERCIAL

## 2023-12-12 VITALS
HEIGHT: 66 IN | OXYGEN SATURATION: 98 % | WEIGHT: 141 LBS | BODY MASS INDEX: 22.66 KG/M2 | TEMPERATURE: 97.1 F | DIASTOLIC BLOOD PRESSURE: 70 MMHG | SYSTOLIC BLOOD PRESSURE: 136 MMHG | RESPIRATION RATE: 16 BRPM | HEART RATE: 58 BPM

## 2023-12-12 PROCEDURE — 99215 OFFICE O/P EST HI 40 MIN: CPT

## 2023-12-12 RX ORDER — DICLOFENAC SODIUM 1% 10 MG/G
1 GEL TOPICAL
Qty: 3 | Refills: 0 | Status: ACTIVE | COMMUNITY
Start: 2023-12-12 | End: 1900-01-01

## 2023-12-13 ENCOUNTER — TRANSCRIPTION ENCOUNTER (OUTPATIENT)
Age: 58
End: 2023-12-13

## 2023-12-13 LAB
ALBUMIN SERPL ELPH-MCNC: 4.7 G/DL
ALP BLD-CCNC: 71 U/L
ALT SERPL-CCNC: 21 U/L
ANION GAP SERPL CALC-SCNC: 11 MMOL/L
AST SERPL-CCNC: 18 U/L
BASOPHILS # BLD AUTO: 0.04 K/UL
BASOPHILS NFR BLD AUTO: 0.6 %
BILIRUB SERPL-MCNC: 0.6 MG/DL
BUN SERPL-MCNC: 12 MG/DL
CALCIUM SERPL-MCNC: 9.5 MG/DL
CHLORIDE SERPL-SCNC: 105 MMOL/L
CK SERPL-CCNC: 105 U/L
CO2 SERPL-SCNC: 26 MMOL/L
CREAT SERPL-MCNC: 0.73 MG/DL
CRP SERPL-MCNC: <3 MG/L
EGFR: 94 ML/MIN/1.73M2
EOSINOPHIL # BLD AUTO: 0.09 K/UL
EOSINOPHIL NFR BLD AUTO: 1.3 %
ERYTHROCYTE [SEDIMENTATION RATE] IN BLOOD BY WESTERGREN METHOD: 33 MM/HR
GLUCOSE SERPL-MCNC: 98 MG/DL
HCT VFR BLD CALC: 42.8 %
HGB BLD-MCNC: 13.1 G/DL
IMM GRANULOCYTES NFR BLD AUTO: 0.1 %
LYMPHOCYTES # BLD AUTO: 2.91 K/UL
LYMPHOCYTES NFR BLD AUTO: 41.6 %
MAN DIFF?: NORMAL
MCHC RBC-ENTMCNC: 26.5 PG
MCHC RBC-ENTMCNC: 30.6 GM/DL
MCV RBC AUTO: 86.6 FL
MONOCYTES # BLD AUTO: 0.42 K/UL
MONOCYTES NFR BLD AUTO: 6 %
NEUTROPHILS # BLD AUTO: 3.52 K/UL
NEUTROPHILS NFR BLD AUTO: 50.4 %
PLATELET # BLD AUTO: NORMAL K/UL
POTASSIUM SERPL-SCNC: 4.3 MMOL/L
PROT SERPL-MCNC: 7 G/DL
RBC # BLD: 4.94 M/UL
RBC # FLD: 15.5 %
SODIUM SERPL-SCNC: 142 MMOL/L
WBC # FLD AUTO: 6.99 K/UL

## 2023-12-14 ENCOUNTER — APPOINTMENT (OUTPATIENT)
Dept: INTERNAL MEDICINE | Facility: CLINIC | Age: 58
End: 2023-12-14
Payer: COMMERCIAL

## 2023-12-14 VITALS
TEMPERATURE: 98 F | RESPIRATION RATE: 16 BRPM | WEIGHT: 141 LBS | SYSTOLIC BLOOD PRESSURE: 144 MMHG | BODY MASS INDEX: 22.66 KG/M2 | DIASTOLIC BLOOD PRESSURE: 78 MMHG | HEIGHT: 66 IN | HEART RATE: 76 BPM | OXYGEN SATURATION: 97 %

## 2023-12-14 DIAGNOSIS — H93.8X9 OTHER SPECIFIED DISORDERS OF EAR, UNSPECIFIED EAR: ICD-10-CM

## 2023-12-14 DIAGNOSIS — Z11.1 ENCOUNTER FOR SCREENING FOR RESPIRATORY TUBERCULOSIS: ICD-10-CM

## 2023-12-14 DIAGNOSIS — G62.9 POLYNEUROPATHY, UNSPECIFIED: ICD-10-CM

## 2023-12-14 DIAGNOSIS — J30.2 OTHER SEASONAL ALLERGIC RHINITIS: ICD-10-CM

## 2023-12-14 DIAGNOSIS — R73.03 PREDIABETES.: ICD-10-CM

## 2023-12-14 DIAGNOSIS — B37.9 CANDIDIASIS, UNSPECIFIED: ICD-10-CM

## 2023-12-14 DIAGNOSIS — J34.0 ABSCESS, FURUNCLE AND CARBUNCLE OF NOSE: ICD-10-CM

## 2023-12-14 DIAGNOSIS — F43.9 REACTION TO SEVERE STRESS, UNSPECIFIED: ICD-10-CM

## 2023-12-14 DIAGNOSIS — M54.50 LOW BACK PAIN, UNSPECIFIED: ICD-10-CM

## 2023-12-14 DIAGNOSIS — N94.9 UNSPECIFIED CONDITION ASSOCIATED WITH FEMALE GENITAL ORGANS AND MENSTRUAL CYCLE: ICD-10-CM

## 2023-12-14 DIAGNOSIS — Z87.2 PERSONAL HISTORY OF DISEASES OF THE SKIN AND SUBCUTANEOUS TISSUE: ICD-10-CM

## 2023-12-14 PROCEDURE — 99214 OFFICE O/P EST MOD 30 MIN: CPT

## 2023-12-14 RX ORDER — SULFAMETHOXAZOLE AND TRIMETHOPRIM 800; 160 MG/1; MG/1
800-160 TABLET ORAL TWICE DAILY
Qty: 14 | Refills: 0 | Status: DISCONTINUED | COMMUNITY
Start: 2023-09-14 | End: 2023-12-14

## 2023-12-14 RX ORDER — BENZONATATE 100 MG/1
100 CAPSULE ORAL
Qty: 15 | Refills: 0 | Status: DISCONTINUED | COMMUNITY
Start: 2023-11-09 | End: 2023-12-14

## 2023-12-14 RX ORDER — FLUCONAZOLE 150 MG/1
150 TABLET ORAL
Qty: 1 | Refills: 0 | Status: DISCONTINUED | COMMUNITY
Start: 2023-09-14 | End: 2023-12-14

## 2023-12-14 RX ORDER — CETIRIZINE HYDROCHLORIDE 10 MG/1
10 TABLET, FILM COATED ORAL
Qty: 90 | Refills: 0 | Status: ACTIVE | COMMUNITY
Start: 2018-10-19 | End: 1900-01-01

## 2023-12-14 RX ORDER — EVE PRIMROSE/LINOLEIC/G-LENIC 1000 MG
1000 CAPSULE ORAL
Refills: 0 | Status: DISCONTINUED | COMMUNITY
End: 2023-12-14

## 2023-12-14 RX ORDER — AMOXICILLIN AND CLAVULANATE POTASSIUM 875; 125 MG/1; MG/1
875-125 TABLET, COATED ORAL
Qty: 14 | Refills: 0 | Status: DISCONTINUED | COMMUNITY
Start: 2023-11-09 | End: 2023-12-14

## 2023-12-14 RX ORDER — CYCLOBENZAPRINE HYDROCHLORIDE 5 MG/1
5 TABLET, FILM COATED ORAL
Qty: 28 | Refills: 0 | Status: DISCONTINUED | COMMUNITY
Start: 2023-03-07 | End: 2023-12-14

## 2023-12-14 NOTE — HISTORY OF PRESENT ILLNESS
[FreeTextEntry1] : f/u [de-identified] : 59 yo F pmhx HTN, HLD, preDM, GERD, empysemia, MCTD, necrotizing myopathy, kidney stone, vit d insuff, B12 def for f/u  Last seen 11/9/23 for acute visit with cough and right ear pain- given Augmentin -had negative fllu/covid/RSV swab -cxr ordered, found negative  Feeling well. Needs med refill. Not fasting - wants slip  States went to  recently for right ear pain and throat pain- told negative strep swab, dx'd ear infection and given Augmentin course (now finished)- reports pain has resolved, has occasional clogging on/off +occ postnasal drip -denies ear d/c -no sx med taken, does not like to use Flonase so not taken.  Needs fill on Zyrtec.  Reports is socially distancing and using precautions for covid prevention- on/off. Denies sick or covid positive contacts. -hx Moderna vaccine series, last booster 11/23  hx anxiety- mild, currently feels is mainly 2/2 health concerns; denies panic attacks, depression, HI or SI.   -hx onset 2/2 mom passing away in 4/20 (was at a NH and endstage COPD) -on clonazepam prn, helps - used infrequently -Therapy pending, has resources - states is holding off, does not feel needs it at this time -sleeping well  HTN, hx CP (resolved)- -off HCTZ since 12/22 per cardio -on amlodipine- benazepril -home BP:  no recent -seen by cardio, Dr. Rivero, 12/22 felt dizziness likely 2/2 HCTZ given low BPs noted and advised Rx d/c.  Told CP unlikely cardiac.  F/u pending. -followed by optho, seen 9/23  exercise: has , meet 2x/wk x 1 hr, walks 8-10 k steps/d - done w/o sx's or limitations eating healthier, cut down on sugar and fatty foods  hx ARAGON, hx abnl CT chest 11/22- -followed by pulm, last seen 12/22 for repeat PFTs, told stable and no meds/intervention advised for hx mild emphysema.  Planned to f/u in 1 yr for repeat CT chest then.   Reports nl appetite and BMs.   Denies GI complaints.  hx MCTD, necrotizing myopathy (hx muscle bx), hx imbalance (improved)/ /HAs (resolved)-  notes stable on med regimen per rheum.  Awaiting right shoulder/clavicle imaging.  Hx PN- improved on gabapentin per neurology -followed by rheum, seen 12/23 -followed by neurology, seen 10/23 with EMG done +CTS, right ulnar neuropathy, no cervical radiculopathy  Denies  complaints

## 2023-12-14 NOTE — PHYSICAL EXAM
[No Acute Distress] : no acute distress [Well-Appearing] : well-appearing [Normal Sclera/Conjunctiva] : normal sclera/conjunctiva [PERRL] : pupils equal round and reactive to light [EOMI] : extraocular movements intact [Normal Outer Ear/Nose] : the outer ears and nose were normal in appearance [Normal Oropharynx] : the oropharynx was normal [Normal Nasal Mucosa] : the nasal mucosa was normal [No Lymphadenopathy] : no lymphadenopathy [Supple] : supple [Thyroid Normal, No Nodules] : the thyroid was normal and there were no nodules present [No Respiratory Distress] : no respiratory distress  [Clear to Auscultation] : lungs were clear to auscultation bilaterally [Normal Rate] : normal rate  [Regular Rhythm] : with a regular rhythm [Normal S1, S2] : normal S1 and S2 [No Murmur] : no murmur heard [Pedal Pulses Present] : the pedal pulses are present [No Edema] : there was no peripheral edema [Soft] : abdomen soft [Non Tender] : non-tender [Non-distended] : non-distended [No HSM] : no HSM [Normal Supraclavicular Nodes] : no supraclavicular lymphadenopathy [Normal Posterior Cervical Nodes] : no posterior cervical lymphadenopathy [Normal Anterior Cervical Nodes] : no anterior cervical lymphadenopathy [No CVA Tenderness] : no CVA  tenderness [No Spinal Tenderness] : no spinal tenderness [No Joint Swelling] : no joint swelling [No Rash] : no rash [No Focal Deficits] : no focal deficits [Normal Gait] : normal gait [Normal Affect] : the affect was normal [Alert and Oriented x3] : oriented to person, place, and time [Normal TMs] : both tympanic membranes were normal [Grossly Normal Strength/Tone] : grossly normal strength/tone [de-identified] : no sinus tenderness

## 2023-12-14 NOTE — ASSESSMENT
[FreeTextEntry1] : ________________________________________________________________________ 59 yo F pmhx seasonal allergies, HTN, HLD, preDM, GERD, emphysema, MCTD, necrotizing myopathy, kidney stone, vit d insuff, B12 def for f/u  hx ear infections- s/p UC with Augmentin course.  Resolved pain.  +clogging on/off, +PND. Likely ETD. -declines nasal spray use -trial of Zyrtec prn -ENT referral given  HTN, atypical CP (resolved), hx abnl EKG- asx -off HCTZ 12.5 per cardio in 12/22 2/2 dizziness -on amlodipine- benazepril -11/20 exercise stress test: normal study -3/22 echo: EF 60-65%, mild MR/TR, no LV fxn and size (no chg x/w 11/20) -3/23 UA negative, urine prt/crt wnl -3/23 cmp wnl, cbc wnl, except plt clumped -seen by cardio, Dr. Rivero, 12/22 felt dizziness likely 2/2 HCTZ given low BPs noted and advised Rx d/c. Told CP unlikely cardiac. F/u pending. -followed by optho, seen 9/23 -low salt diet advised -advised prompt medical eval if sx's recur  hx MCTD, necrotizing myopathy (hx muscle bx), hx imbalance/HAs (resolved)- notes stable. Awaiting right shoulder/clavicle imaging.  -Hx PN- improved on gabapentin per neurology -followed by rheum, seen 12/23 -followed by neurology, seen 10/23 with EMG done +CTS, right ulnar neuropathy, no cervical radiculopathy -on CellCept (restarted fall 2019) -followed by neuro, seen 10/23 with EMG done- noted b/l CTS and right ulnar neuropathy and no cervical radiculopathy -followed by optho, seen 9/23 -12/12/23 cmp wnl, cbc wnl, except plt clumped -check plt in blue top  hx ARAGON (resolved), emphysema, abnl CT chest- asx -followed by pulm, last seen 12/22 for repeat PFTs, told stable and no meds/intervention advised for hx mild emphysema. Planned to f/u in 1 yr for repeat CT chest then. Pending. -followed by cardio  preDM- 7/23 A1c 5.8 (was 5.8) -ADA diet, exercise encouraged -check A1c  HLD- 7/23 Tchol 235 TG 63  HDL 93 -low fat diet, exercise encouraged -check lipids, slip given to do fasting  GERD, IBS/constipation, hx hemorrhoids- controlled -followed by GI -hx screening colonoscopy 4/18, rec: repeat in 5 yrs (Dr. Prosper Guerra)- plans to f/u soon. -on omeprazole -on Metamucil prn -on prep H prn -asked to forward records  hx right kidney stone- dx'd by  7/22 on w/u of pain- since resolved -followed by , seen 7/22 with US done, felt not cause of sx and no intervention advised. Planned to f/u in 1 yr with , pending  depression/anxiety, hx grief- +mild anxiety 2/2 health issues, denies panic attacks, depression or HI/SI -declines standing mood medication (ie SSRI) -on clonazepam prn- istop checked, refilled. Risks/benefits/SEs discussed. Advised to avoid concurrent use with other sedating medications due to potential additive effects. Advised of need for visit for further refills. - referral referral given prior (declines in office eval)- pending, now wishes to not pursue as feels not currently needed -info for behavioral health crisis walk-in center given prior (confirms has info) -support provided, advised to f/u if sx's worsen  hx vit d def- -3/23 level 28 -on OTC supp  hx B12 def- -3/23 B12/folate wnl -on MVI   MISC: Continued social distancing and measure for covid19 prevention encouraged. -hx Moderna vaccine series, last booster 11/23   HCM -hx CPE 3/23, yearly advised -hx negative hep C screening 3/23 -hx flu shot 9/23 -hx Tdap 8/18 -hx prevnar (13) 12/19 -hx PVX (23) 3/20 -advised prevnar 20- declines -advised to check on shingrix insurance coverage and get okay to get from rheum prior to considering -hx abnl PAP 8/21 (LSIL), HPV + , s/p colposcopy 10/21 path: +FLEX I, hx negative PAP 8/23. -hx negative mammo 6/23 - followed by Dr. Carrera -hx nl DEXA 4/22 -hx screening colonoscopy 4/18, rec: repeat in 5 yrs (Dr. Prosper Guerra)- plans to f/u soon. Asked to forward records. -followed by derm, yearly FBSE advised. Regular use of sun block for skin cancer prevention advised.  Pt's cell: [phone]  independent

## 2023-12-14 NOTE — REVIEW OF SYSTEMS
[Negative] : Cardiovascular [FreeTextEntry4] : see HPI [FreeTextEntry9] : see HPI [de-identified] : see HPI

## 2023-12-15 ENCOUNTER — TRANSCRIPTION ENCOUNTER (OUTPATIENT)
Age: 58
End: 2023-12-15

## 2023-12-21 ENCOUNTER — TRANSCRIPTION ENCOUNTER (OUTPATIENT)
Age: 58
End: 2023-12-21

## 2023-12-22 ENCOUNTER — TRANSCRIPTION ENCOUNTER (OUTPATIENT)
Age: 58
End: 2023-12-22

## 2023-12-22 ENCOUNTER — NON-APPOINTMENT (OUTPATIENT)
Age: 58
End: 2023-12-22

## 2023-12-27 ENCOUNTER — TRANSCRIPTION ENCOUNTER (OUTPATIENT)
Age: 58
End: 2023-12-27

## 2023-12-29 ENCOUNTER — APPOINTMENT (OUTPATIENT)
Dept: MRI IMAGING | Facility: CLINIC | Age: 58
End: 2023-12-29

## 2024-01-03 ENCOUNTER — TRANSCRIPTION ENCOUNTER (OUTPATIENT)
Age: 59
End: 2024-01-03

## 2024-02-13 ENCOUNTER — APPOINTMENT (OUTPATIENT)
Dept: INTERNAL MEDICINE | Facility: CLINIC | Age: 59
End: 2024-02-13

## 2024-02-14 ENCOUNTER — TRANSCRIPTION ENCOUNTER (OUTPATIENT)
Age: 59
End: 2024-02-14

## 2024-02-27 ENCOUNTER — OUTPATIENT (OUTPATIENT)
Dept: OUTPATIENT SERVICES | Facility: HOSPITAL | Age: 59
LOS: 1 days | End: 2024-02-27

## 2024-02-27 ENCOUNTER — APPOINTMENT (OUTPATIENT)
Dept: INTERNAL MEDICINE | Facility: CLINIC | Age: 59
End: 2024-02-27
Payer: COMMERCIAL

## 2024-02-27 VITALS
SYSTOLIC BLOOD PRESSURE: 168 MMHG | DIASTOLIC BLOOD PRESSURE: 77 MMHG | WEIGHT: 146 LBS | OXYGEN SATURATION: 100 % | HEIGHT: 66 IN | BODY MASS INDEX: 23.46 KG/M2 | HEART RATE: 55 BPM

## 2024-02-27 DIAGNOSIS — Z82.3 FAMILY HISTORY OF STROKE: ICD-10-CM

## 2024-02-27 DIAGNOSIS — Z82.49 FAMILY HISTORY OF ISCHEMIC HEART DISEASE AND OTHER DISEASES OF THE CIRCULATORY SYSTEM: ICD-10-CM

## 2024-02-27 DIAGNOSIS — H61.21 IMPACTED CERUMEN, RIGHT EAR: ICD-10-CM

## 2024-02-27 DIAGNOSIS — Z98.51 TUBAL LIGATION STATUS: Chronic | ICD-10-CM

## 2024-02-27 DIAGNOSIS — Z98.890 OTHER SPECIFIED POSTPROCEDURAL STATES: Chronic | ICD-10-CM

## 2024-02-27 DIAGNOSIS — F41.9 ANXIETY DISORDER, UNSPECIFIED: ICD-10-CM

## 2024-02-27 DIAGNOSIS — Z00.00 ENCOUNTER FOR GENERAL ADULT MEDICAL EXAMINATION W/OUT ABNORMAL FINDINGS: ICD-10-CM

## 2024-02-27 DIAGNOSIS — I10 ESSENTIAL (PRIMARY) HYPERTENSION: ICD-10-CM

## 2024-02-27 DIAGNOSIS — M21.619 BUNION OF UNSPECIFIED FOOT: ICD-10-CM

## 2024-02-27 DIAGNOSIS — Z80.1 FAMILY HISTORY OF MALIGNANT NEOPLASM OF TRACHEA, BRONCHUS AND LUNG: ICD-10-CM

## 2024-02-27 DIAGNOSIS — Z00.00 ENCOUNTER FOR GENERAL ADULT MEDICAL EXAMINATION WITHOUT ABNORMAL FINDINGS: ICD-10-CM

## 2024-02-27 PROCEDURE — 99396 PREV VISIT EST AGE 40-64: CPT

## 2024-02-27 PROCEDURE — 99386 PREV VISIT NEW AGE 40-64: CPT

## 2024-02-27 PROCEDURE — G0444 DEPRESSION SCREEN ANNUAL: CPT | Mod: NC,59

## 2024-02-28 ENCOUNTER — TRANSCRIPTION ENCOUNTER (OUTPATIENT)
Age: 59
End: 2024-02-28

## 2024-02-29 LAB
25(OH)D3 SERPL-MCNC: 38.9 NG/ML
ALBUMIN SERPL ELPH-MCNC: 4.5 G/DL
ALP BLD-CCNC: 67 U/L
ALT SERPL-CCNC: 14 U/L
ANION GAP SERPL CALC-SCNC: 10 MMOL/L
AST SERPL-CCNC: 18 U/L
BASOPHILS # BLD AUTO: 0.03 K/UL
BASOPHILS NFR BLD AUTO: 0.5 %
BILIRUB SERPL-MCNC: 0.4 MG/DL
BUN SERPL-MCNC: 10 MG/DL
CALCIUM SERPL-MCNC: 9.3 MG/DL
CHLORIDE SERPL-SCNC: 107 MMOL/L
CHOLEST SERPL-MCNC: 225 MG/DL
CO2 SERPL-SCNC: 25 MMOL/L
CREAT SERPL-MCNC: 0.65 MG/DL
EGFR: 100 ML/MIN/1.73M2
EOSINOPHIL # BLD AUTO: 0.07 K/UL
EOSINOPHIL NFR BLD AUTO: 1.1 %
ESTIMATED AVERAGE GLUCOSE: 114 MG/DL
FOLATE SERPL-MCNC: >20 NG/ML
GLUCOSE SERPL-MCNC: 96 MG/DL
HBA1C MFR BLD HPLC: 5.6 %
HCT VFR BLD CALC: 40.1 %
HDLC SERPL-MCNC: 95 MG/DL
HGB BLD-MCNC: 12.6 G/DL
IMM GRANULOCYTES NFR BLD AUTO: 0.2 %
LDLC SERPL CALC-MCNC: 123 MG/DL
LDLC SERPL DIRECT ASSAY-MCNC: 121 MG/DL
LYMPHOCYTES # BLD AUTO: 2.32 K/UL
LYMPHOCYTES NFR BLD AUTO: 35.5 %
MAN DIFF?: NORMAL
MCHC RBC-ENTMCNC: 27.6 PG
MCHC RBC-ENTMCNC: 31.4 GM/DL
MCV RBC AUTO: 87.7 FL
MONOCYTES # BLD AUTO: 0.42 K/UL
MONOCYTES NFR BLD AUTO: 6.4 %
NEUTROPHILS # BLD AUTO: 3.69 K/UL
NEUTROPHILS NFR BLD AUTO: 56.3 %
NONHDLC SERPL-MCNC: 130 MG/DL
PLATELET # BLD AUTO: NORMAL K/UL
POTASSIUM SERPL-SCNC: 4.7 MMOL/L
PROT SERPL-MCNC: 6.9 G/DL
RBC # BLD: 4.57 M/UL
RBC # FLD: 15.2 %
SODIUM SERPL-SCNC: 142 MMOL/L
TRIGL SERPL-MCNC: 43 MG/DL
VIT B12 SERPL-MCNC: 476 PG/ML
WBC # FLD AUTO: 6.54 K/UL

## 2024-02-29 NOTE — ASSESSMENT
[FreeTextEntry1] : #HCM up to date with age related cancer screening. Up to date with immunizations- will discuss with Rheumatology for Shingles vaccine. Routine blood work ordered. Declined STI screen.   All questions and concerns addressed. RTC in 3 months or early if needed.

## 2024-02-29 NOTE — PHYSICAL EXAM
[Normal Sclera/Conjunctiva] : normal sclera/conjunctiva [PERRL] : pupils equal round and reactive to light [EOMI] : extraocular movements intact [Normal Outer Ear/Nose] : the outer ears and nose were normal in appearance [Normal Oropharynx] : the oropharynx was normal [Normal Nasal Mucosa] : the nasal mucosa was normal [No Lymphadenopathy] : no lymphadenopathy [Supple] : supple [No Respiratory Distress] : no respiratory distress  [No Accessory Muscle Use] : no accessory muscle use [Clear to Auscultation] : lungs were clear to auscultation bilaterally [Pedal Pulses Present] : the pedal pulses are present [No Edema] : there was no peripheral edema [Soft] : abdomen soft [Non Tender] : non-tender [Non-distended] : non-distended [Normal Bowel Sounds] : normal bowel sounds [Normal Supraclavicular Nodes] : no supraclavicular lymphadenopathy [Normal Posterior Cervical Nodes] : no posterior cervical lymphadenopathy [Normal Anterior Cervical Nodes] : no anterior cervical lymphadenopathy [Normal] : normal gait, coordination grossly intact, no focal deficits and deep tendon reflexes were 2+ and symmetric [Normal Affect] : the affect was normal [Alert and Oriented x3] : oriented to person, place, and time [Normal Mood] : the mood was normal [Normal Insight/Judgement] : insight and judgment were intact [de-identified] : impacted cerumen right ear [de-identified] : bilateral bunions

## 2024-02-29 NOTE — HISTORY OF PRESENT ILLNESS
[FreeTextEntry1] : Establish Care. [de-identified] : The patient is a very pleasant 60 y/o F with PMHx of HTN, HLD, Prediabetes, GERD, emphysema, MCTD (mixed connective tissue disease), necrotizing myopathy, kidney stone, vit d insufficiency, B12 deficiency who presents today to establish care.  She is closely following with Rheumatology. The patient is in her usual state of health and has no medical concerns today.   Preventative care- Mammogram- June 2023- Normal. Pap- Aug 2023- Normal. Colonoscopy-2018 Normal. DEXA- April, 2022- Normal.   Immunizations- COVID-up to date Influenza- Nov. 2, 2023 Tdap-up to date Shingles- will discuss with Rhematologist Pneumococcal- up to date

## 2024-02-29 NOTE — HEALTH RISK ASSESSMENT
[Good] : ~his/her~  mood as  good [Yes] : Yes [Monthly or less (1 pt)] : Monthly or less (1 point) [1 or 2 (0 pts)] : 1 or 2 (0 points) [Never (0 pts)] : Never (0 points) [No] : In the past 12 months have you used drugs other than those required for medical reasons? No [No falls in past year] : Patient reported no falls in the past year [0] : 2) Feeling down, depressed, or hopeless: Not at all (0) [PHQ-2 Negative - No further assessment needed] : PHQ-2 Negative - No further assessment needed [HIV test declined] : HIV test declined [Hepatitis C test declined] : Hepatitis C test declined [With Family] : lives with family [Employed] : employed [] :  [Sexually Active] : sexually active [Fully functional (bathing, dressing, toileting, transferring, walking, feeding)] : Fully functional (bathing, dressing, toileting, transferring, walking, feeding) [Fully functional (using the telephone, shopping, preparing meals, housekeeping, doing laundry, using] : Fully functional and needs no help or supervision to perform IADLs (using the telephone, shopping, preparing meals, housekeeping, doing laundry, using transportation, managing medications and managing finances) [Former] : Former [Audit-CScore] : 1 [BAV9Irier] : 0 [Reports changes in hearing] : Reports no changes in hearing [Reports changes in vision] : Reports no changes in vision [Reports changes in dental health] : Reports no changes in dental health [MammogramDate] : 06/2023 [PapSmearDate] : 08/2023 [BoneDensityDate] : 2022 [ColonoscopyDate] : 2018 [de-identified] :  [FreeTextEntry2] : [employer] [facility] [employer] [facility]  [de-identified] : Quit 2007

## 2024-03-01 ENCOUNTER — TRANSCRIPTION ENCOUNTER (OUTPATIENT)
Age: 59
End: 2024-03-01

## 2024-03-04 ENCOUNTER — TRANSCRIPTION ENCOUNTER (OUTPATIENT)
Age: 59
End: 2024-03-04

## 2024-03-06 ENCOUNTER — OUTPATIENT (OUTPATIENT)
Dept: OUTPATIENT SERVICES | Facility: HOSPITAL | Age: 59
LOS: 1 days | End: 2024-03-06

## 2024-03-06 ENCOUNTER — APPOINTMENT (OUTPATIENT)
Dept: INTERNAL MEDICINE | Facility: CLINIC | Age: 59
End: 2024-03-06
Payer: COMMERCIAL

## 2024-03-06 ENCOUNTER — TRANSCRIPTION ENCOUNTER (OUTPATIENT)
Age: 59
End: 2024-03-06

## 2024-03-06 VITALS — WEIGHT: 146 LBS | BODY MASS INDEX: 23.46 KG/M2 | HEIGHT: 66 IN

## 2024-03-06 DIAGNOSIS — Z98.51 TUBAL LIGATION STATUS: Chronic | ICD-10-CM

## 2024-03-06 DIAGNOSIS — E78.5 HYPERLIPIDEMIA, UNSPECIFIED: ICD-10-CM

## 2024-03-06 DIAGNOSIS — Z98.890 OTHER SPECIFIED POSTPROCEDURAL STATES: Chronic | ICD-10-CM

## 2024-03-06 DIAGNOSIS — Z98.89 OTHER SPECIFIED POSTPROCEDURAL STATES: Chronic | ICD-10-CM

## 2024-03-06 PROCEDURE — 99441: CPT

## 2024-03-06 NOTE — ASSESSMENT
[FreeTextEntry1] : The plan of care was discussed with the patient in full detail. She verbalized understanding and in agreement with the plan of care.

## 2024-03-06 NOTE — HISTORY OF PRESENT ILLNESS
[FreeTextEntry1] : Follow up visit to discuss lab results.  [de-identified] : The patient is a very pleasant 58 y/o F with PMHx of HTN, HLD, Prediabetes, GERD, emphysema, MCTD (mixed connective tissue disease), necrotizing myopathy, kidney stone, vit d insufficiency, B12 deficiency who presents today to discuss lab results.

## 2024-03-13 ENCOUNTER — TRANSCRIPTION ENCOUNTER (OUTPATIENT)
Age: 59
End: 2024-03-13

## 2024-03-14 ENCOUNTER — NON-APPOINTMENT (OUTPATIENT)
Age: 59
End: 2024-03-14

## 2024-03-15 ENCOUNTER — APPOINTMENT (OUTPATIENT)
Dept: CARDIOLOGY | Facility: CLINIC | Age: 59
End: 2024-03-15
Payer: COMMERCIAL

## 2024-03-15 DIAGNOSIS — I27.20 PULMONARY HYPERTENSION, UNSPECIFIED: ICD-10-CM

## 2024-03-15 DIAGNOSIS — R07.89 OTHER CHEST PAIN: ICD-10-CM

## 2024-03-15 DIAGNOSIS — R06.02 SHORTNESS OF BREATH: ICD-10-CM

## 2024-03-15 DIAGNOSIS — Z13.6 ENCOUNTER FOR SCREENING FOR CARDIOVASCULAR DISORDERS: ICD-10-CM

## 2024-03-15 PROCEDURE — G2211 COMPLEX E/M VISIT ADD ON: CPT

## 2024-03-15 PROCEDURE — 99214 OFFICE O/P EST MOD 30 MIN: CPT

## 2024-03-19 ENCOUNTER — LABORATORY RESULT (OUTPATIENT)
Age: 59
End: 2024-03-19

## 2024-03-19 ENCOUNTER — APPOINTMENT (OUTPATIENT)
Dept: RHEUMATOLOGY | Facility: CLINIC | Age: 59
End: 2024-03-19
Payer: COMMERCIAL

## 2024-03-19 VITALS
HEART RATE: 60 BPM | HEIGHT: 66 IN | BODY MASS INDEX: 23.46 KG/M2 | RESPIRATION RATE: 16 BRPM | TEMPERATURE: 98.1 F | SYSTOLIC BLOOD PRESSURE: 148 MMHG | WEIGHT: 146 LBS | DIASTOLIC BLOOD PRESSURE: 70 MMHG | OXYGEN SATURATION: 98 %

## 2024-03-19 DIAGNOSIS — M89.8X1 OTHER SPECIFIED DISORDERS OF BONE, SHOULDER: ICD-10-CM

## 2024-03-19 DIAGNOSIS — M25.512 PAIN IN LEFT SHOULDER: ICD-10-CM

## 2024-03-19 PROCEDURE — 99215 OFFICE O/P EST HI 40 MIN: CPT

## 2024-03-19 RX ORDER — MYCOPHENOLATE MOFETIL 500 MG/1
500 TABLET ORAL
Qty: 180 | Refills: 11 | Status: ACTIVE | COMMUNITY
Start: 2019-08-28 | End: 1900-01-01

## 2024-03-19 NOTE — DATA REVIEWED
[FreeTextEntry1] : Laboratory and radiology studies that were personally reviewed at today's visit are summarized in above and below: :  EMG - mild right ulnar neuropathy at the elbow and mild median neuropathy at the wrists.  ortho  note (11-8-2023):  "Right acromioclavicular and sternoclavicular views were obtained today that show no fracture, or dislocation. There is mild AC joint arthritis." MRI Thighs (8-2023): No muscular edema to suggest acute myositis. No significant muscular atrophy Mild fatty infiltration of the bilateral gluteus rubio and bilateral tensor fascia judah muscles.  CT chest ():  diffuse tree in bud nodules and GGO in the lower lungs and mild centrilobular emphysema.    Pulm note (12-28-22)  stable pf,t symptomatic improvement in ARAGON, no ILD On CT chest, DLCO improved on PFT.   Cards (12-20-22):  improved BP with change in meds.  MRI neck (9-2022):  multilevel spondylosis  PMR note - RAHEEM with relief.  dexa 4-2022:  Normal  ortho note (5-16-22):  reactive cervical myofascial pain - trigger point injections  7.20.16:  RF=50, ANJUM = 1:80, MZI=258, CK 1793 ortho note (8-22-22) cervical myofascial pain tx with trigger point injections.

## 2024-03-19 NOTE — REVIEW OF SYSTEMS
[Fever] : no fever [Chills] : no chills [Chest Pain] : no chest pain [Anxiety] : no anxiety [Muscle Weakness] : no muscle weakness [Swollen Glands] : no swollen glands

## 2024-03-19 NOTE — ASSESSMENT
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 here for follow up for necrotizing myopathy/UCTD and Joint Pain (OA) and aymetric clavicle   #necrotizing myopathy (SRP positive) - Will check laboratory tests to look for markers of disease activity and also to assess for medication toxicity. - for now continue MMF 3g daily but may consider changing to rituxan based on blood work and eval for inflammatory arthritis  -  cxr WNL   #mononeuropathy of UE b/l - gabapentin as above mg TID   - follow up neurology  #secondary fibromyalgia - poor sleep hygiene, multiple tender points, anxiety - not currently active   #structural back disease - improved and not active  # GERD - improved and not active , continue omprazole.   # clavicle assymetry  2021 imaging reviewed and will check MRI again to look for changes associated with inflammatory arthritis  MRI not yet approved. repeat xray clavicle   Joint pain  clincially c/w OA but given history of necrotizing myositis will look for inflammatory  arthritis  blood work and imaging as below trial of diclofeanc   HCM Prevnar in dec 2019 and pneumovax in march 2020 Moderna x 2 (2-2021) and booster in 8-2021 and to get booster in fall 2022 (bivalent) Flu shot fall 2023   [facility] DeXA 4-2022 - normal Continue calcium total 1200 mg. repeat dexa in 4-2025   More than 50% of the encounter was spent counseling TESSY HERNANDEZ on the differential, workup, disease course, and treatment/management.   Education was provided to TESSY HERNANDEZ during this encounter. All questions and concerns were answered and addressed in detail.  TESSY HERNANDEZ verbalized understanding and agreed to the plan.   TESSY HERNANDEZ has been instructed to call for an earlier appointment if new symptoms develop in the interim. TESSY HERNANDEZ has been instructed to make a follow-up appointment in 3 months

## 2024-03-19 NOTE — HISTORY OF PRESENT ILLNESS
[FreeTextEntry1] : 3- - notes she has joint aching (shoulders, hands, knees) with mild swelling (hands, knees).  Knees - intermittent, shoulders and hands - constant. had some relief with her husbands meloxicam. clavicle less painful still asymetric.  no rashes Saw Dr. Rivero and is having a CAC done.    Wartpeel Counseling:  I discussed with the patient the risks of Wartpeel including but not limited to erythema, scaling, itching, weeping, crusting, and pain.

## 2024-03-19 NOTE — PHYSICAL EXAM
[FreeTextEntry1] : clavicle asymetric, ttp in cm[facility] right, negative finkelsteins. ttp bilateral shoudlers,   5/5 US and LE b/l d/p

## 2024-03-21 ENCOUNTER — TRANSCRIPTION ENCOUNTER (OUTPATIENT)
Age: 59
End: 2024-03-21

## 2024-03-21 LAB
ALBUMIN MFR SERPL ELPH: 59.6 %
ALBUMIN SERPL ELPH-MCNC: 4.7 G/DL
ALBUMIN SERPL-MCNC: 4.2 G/DL
ALBUMIN/GLOB SERPL: 1.4 RATIO
ALDOLASE SERPL-CCNC: 4 U/L
ALP BLD-CCNC: 72 U/L
ALPHA1 GLOB MFR SERPL ELPH: 4.7 %
ALPHA1 GLOB SERPL ELPH-MCNC: 0.3 G/DL
ALPHA2 GLOB MFR SERPL ELPH: 9.9 %
ALPHA2 GLOB SERPL ELPH-MCNC: 0.7 G/DL
ALT SERPL-CCNC: 11 U/L
ANION GAP SERPL CALC-SCNC: 11 MMOL/L
AST SERPL-CCNC: 16 U/L
B-GLOBULIN MFR SERPL ELPH: 11.6 %
B-GLOBULIN SERPL ELPH-MCNC: 0.8 G/DL
BASOPHILS # BLD AUTO: 0.04 K/UL
BASOPHILS NFR BLD AUTO: 0.6 %
BILIRUB SERPL-MCNC: 0.8 MG/DL
BUN SERPL-MCNC: 12 MG/DL
CALCIUM SERPL-MCNC: 9.6 MG/DL
CCP AB SER IA-ACNC: <8 UNITS
CD16+CD56+ CELLS # BLD: 353 CELLS/UL
CD16+CD56+ CELLS NFR BLD: 16 %
CD19 CELLS NFR BLD: 223 CELLS/UL
CD3 CELLS # BLD: 1572 CELLS/UL
CD3 CELLS NFR BLD: 72 %
CD3+CD4+ CELLS # BLD: 1258 CELLS/UL
CD3+CD4+ CELLS NFR BLD: 59 %
CD3+CD4+ CELLS/CD3+CD8+ CLL SPEC: 5 RATIO
CD3+CD8+ CELLS # SPEC: 252 CELLS/UL
CD3+CD8+ CELLS NFR BLD: 12 %
CELLS.CD3-CD19+/CELLS IN BLOOD: 10 %
CHLORIDE SERPL-SCNC: 105 MMOL/L
CK SERPL-CCNC: 137 U/L
CO2 SERPL-SCNC: 26 MMOL/L
CREAT SERPL-MCNC: 0.74 MG/DL
DEPRECATED KAPPA LC FREE/LAMBDA SER: 1.53 RATIO
EGFR: 92 ML/MIN/1.73M2
EOSINOPHIL # BLD AUTO: 0.04 K/UL
EOSINOPHIL NFR BLD AUTO: 0.6 %
GAMMA GLOB FLD ELPH-MCNC: 1 G/DL
GAMMA GLOB MFR SERPL ELPH: 14.2 %
HAV IGM SER QL: NONREACTIVE
HBV CORE IGG+IGM SER QL: NONREACTIVE
HBV CORE IGM SER QL: NONREACTIVE
HBV SURFACE AG SER QL: NONREACTIVE
HCT VFR BLD CALC: 39.6 %
HCV AB SER QL: NONREACTIVE
HCV S/CO RATIO: 0.1 S/CO
HGB BLD-MCNC: 12.7 G/DL
IGA SER QL IEP: 201 MG/DL
IGA SER QL IEP: 201 MG/DL
IGG SER QL IEP: 1009 MG/DL
IGM SER QL IEP: 51 MG/DL
IMM GRANULOCYTES NFR BLD AUTO: 0.1 %
INTERPRETATION SERPL IEP-IMP: NORMAL
KAPPA LC CSF-MCNC: 0.99 MG/DL
KAPPA LC SERPL-MCNC: 1.51 MG/DL
LYMPHOCYTES # BLD AUTO: 2.15 K/UL
LYMPHOCYTES NFR BLD AUTO: 31.2 %
M PROTEIN SPEC IFE-MCNC: NORMAL
MAN DIFF?: NORMAL
MCHC RBC-ENTMCNC: 27.5 PG
MCHC RBC-ENTMCNC: 32.1 GM/DL
MCV RBC AUTO: 85.9 FL
MONOCYTES # BLD AUTO: 0.46 K/UL
MONOCYTES NFR BLD AUTO: 6.7 %
MYOGLOBIN SERPL-MCNC: <21 NG/ML
NEUTROPHILS # BLD AUTO: 4.19 K/UL
NEUTROPHILS NFR BLD AUTO: 60.8 %
PLATELET # BLD AUTO: NORMAL K/UL
POTASSIUM SERPL-SCNC: 4.3 MMOL/L
PROT SERPL-MCNC: 7.1 G/DL
RBC # BLD: 4.61 M/UL
RBC # FLD: 14.8 %
RF+CCP IGG SER-IMP: NEGATIVE
RHEUMATOID FACT SER QL: 17 IU/ML
SODIUM SERPL-SCNC: 143 MMOL/L
WBC # FLD AUTO: 6.89 K/UL

## 2024-03-24 PROBLEM — R06.02 EXERTIONAL SHORTNESS OF BREATH: Status: ACTIVE | Noted: 2022-03-17

## 2024-03-24 PROBLEM — Z13.6 SCREENING FOR CARDIOVASCULAR CONDITION: Status: ACTIVE | Noted: 2024-03-24

## 2024-03-24 PROBLEM — R07.89 CHEST DISCOMFORT: Status: RESOLVED | Noted: 2022-03-17 | Resolved: 2022-05-16

## 2024-03-24 PROBLEM — I27.20 PULMONARY HYPERTENSION, MILD: Status: ACTIVE | Noted: 2019-08-28

## 2024-03-24 PROBLEM — R07.89 ATYPICAL CHEST PAIN: Status: RESOLVED | Noted: 2019-08-28 | Resolved: 2023-03-07

## 2024-03-26 ENCOUNTER — TRANSCRIPTION ENCOUNTER (OUTPATIENT)
Age: 59
End: 2024-03-26

## 2024-03-26 ENCOUNTER — APPOINTMENT (OUTPATIENT)
Dept: RADIOLOGY | Facility: CLINIC | Age: 59
End: 2024-03-26
Payer: COMMERCIAL

## 2024-03-26 ENCOUNTER — OUTPATIENT (OUTPATIENT)
Dept: OUTPATIENT SERVICES | Facility: HOSPITAL | Age: 59
LOS: 1 days | End: 2024-03-26
Payer: COMMERCIAL

## 2024-03-26 ENCOUNTER — APPOINTMENT (OUTPATIENT)
Dept: CT IMAGING | Facility: CLINIC | Age: 59
End: 2024-03-26
Payer: COMMERCIAL

## 2024-03-26 DIAGNOSIS — E78.5 HYPERLIPIDEMIA, UNSPECIFIED: ICD-10-CM

## 2024-03-26 DIAGNOSIS — Z98.890 OTHER SPECIFIED POSTPROCEDURAL STATES: Chronic | ICD-10-CM

## 2024-03-26 DIAGNOSIS — Z98.89 OTHER SPECIFIED POSTPROCEDURAL STATES: Chronic | ICD-10-CM

## 2024-03-26 DIAGNOSIS — Z98.51 TUBAL LIGATION STATUS: Chronic | ICD-10-CM

## 2024-03-26 DIAGNOSIS — M25.512 PAIN IN LEFT SHOULDER: ICD-10-CM

## 2024-03-26 DIAGNOSIS — Z00.8 ENCOUNTER FOR OTHER GENERAL EXAMINATION: ICD-10-CM

## 2024-03-26 DIAGNOSIS — M89.8X1 OTHER SPECIFIED DISORDERS OF BONE, SHOULDER: ICD-10-CM

## 2024-03-26 PROCEDURE — 73000 X-RAY EXAM OF COLLAR BONE: CPT | Mod: 26,50

## 2024-03-26 PROCEDURE — 73564 X-RAY EXAM KNEE 4 OR MORE: CPT | Mod: 26,50

## 2024-03-26 PROCEDURE — 75571 CT HRT W/O DYE W/CA TEST: CPT | Mod: 26

## 2024-03-26 PROCEDURE — 75571 CT HRT W/O DYE W/CA TEST: CPT

## 2024-03-26 PROCEDURE — 73030 X-RAY EXAM OF SHOULDER: CPT | Mod: 26,50

## 2024-03-26 PROCEDURE — 73030 X-RAY EXAM OF SHOULDER: CPT

## 2024-03-26 PROCEDURE — 73000 X-RAY EXAM OF COLLAR BONE: CPT

## 2024-03-26 PROCEDURE — 73564 X-RAY EXAM KNEE 4 OR MORE: CPT

## 2024-03-27 ENCOUNTER — TRANSCRIPTION ENCOUNTER (OUTPATIENT)
Age: 59
End: 2024-03-27

## 2024-04-03 ENCOUNTER — NON-APPOINTMENT (OUTPATIENT)
Age: 59
End: 2024-04-03

## 2024-04-03 ENCOUNTER — TRANSCRIPTION ENCOUNTER (OUTPATIENT)
Age: 59
End: 2024-04-03

## 2024-04-03 DIAGNOSIS — I25.10 ATHEROSCLEROTIC HEART DISEASE OF NATIVE CORONARY ARTERY W/OUT ANGINA PECTORIS: ICD-10-CM

## 2024-04-03 RX ORDER — ATORVASTATIN CALCIUM 20 MG/1
20 TABLET, FILM COATED ORAL DAILY
Qty: 1 | Refills: 3 | Status: DISCONTINUED | COMMUNITY
Start: 2024-04-03 | End: 2024-04-03

## 2024-04-03 RX ORDER — ASPIRIN ENTERIC COATED TABLETS 81 MG 81 MG/1
81 TABLET, DELAYED RELEASE ORAL DAILY
Qty: 90 | Refills: 3 | Status: ACTIVE | COMMUNITY
Start: 2024-04-03 | End: 1900-01-01

## 2024-04-03 RX ORDER — EVOLOCUMAB 140 MG/ML
140 INJECTION, SOLUTION SUBCUTANEOUS
Qty: 6 | Refills: 3 | Status: ACTIVE | COMMUNITY
Start: 2024-04-03 | End: 1900-01-01

## 2024-04-04 ENCOUNTER — TRANSCRIPTION ENCOUNTER (OUTPATIENT)
Age: 59
End: 2024-04-04

## 2024-04-07 ENCOUNTER — NON-APPOINTMENT (OUTPATIENT)
Age: 59
End: 2024-04-07

## 2024-04-08 ENCOUNTER — TRANSCRIPTION ENCOUNTER (OUTPATIENT)
Age: 59
End: 2024-04-08

## 2024-04-08 ENCOUNTER — NON-APPOINTMENT (OUTPATIENT)
Age: 59
End: 2024-04-08

## 2024-04-11 ENCOUNTER — RESULT REVIEW (OUTPATIENT)
Age: 59
End: 2024-04-11

## 2024-04-11 ENCOUNTER — TRANSCRIPTION ENCOUNTER (OUTPATIENT)
Age: 59
End: 2024-04-11

## 2024-04-11 ENCOUNTER — APPOINTMENT (OUTPATIENT)
Dept: MRI IMAGING | Facility: CLINIC | Age: 59
End: 2024-04-11
Payer: COMMERCIAL

## 2024-04-11 PROCEDURE — 73220 MRI UPPR EXTREMITY W/O&W/DYE: CPT | Mod: 26,LT

## 2024-04-12 ENCOUNTER — TRANSCRIPTION ENCOUNTER (OUTPATIENT)
Age: 59
End: 2024-04-12

## 2024-04-17 ENCOUNTER — TRANSCRIPTION ENCOUNTER (OUTPATIENT)
Age: 59
End: 2024-04-17

## 2024-04-17 ENCOUNTER — APPOINTMENT (OUTPATIENT)
Dept: DERMATOLOGY | Facility: CLINIC | Age: 59
End: 2024-04-17
Payer: COMMERCIAL

## 2024-04-17 ENCOUNTER — RX RENEWAL (OUTPATIENT)
Age: 59
End: 2024-04-17

## 2024-04-17 DIAGNOSIS — L85.3 XEROSIS CUTIS: ICD-10-CM

## 2024-04-17 DIAGNOSIS — L82.1 OTHER SEBORRHEIC KERATOSIS: ICD-10-CM

## 2024-04-17 DIAGNOSIS — L82.0 INFLAMED SEBORRHEIC KERATOSIS: ICD-10-CM

## 2024-04-17 PROCEDURE — 99203 OFFICE O/P NEW LOW 30 MIN: CPT | Mod: 25

## 2024-04-17 PROCEDURE — 17110 DESTRUCTION B9 LES UP TO 14: CPT

## 2024-04-17 RX ORDER — RITUXIMAB 10 MG/ML
500 INJECTION, SOLUTION INTRAVENOUS
Refills: 0 | Status: ACTIVE | OUTPATIENT
Start: 2024-04-17 | End: 1900-01-01

## 2024-04-17 RX ORDER — METHYLPREDNISOLONE SODIUM SUCCINATE 125 MG/2ML
125 INJECTION, POWDER, FOR SOLUTION INTRAMUSCULAR; INTRAVENOUS
Refills: 0 | Status: ACTIVE | OUTPATIENT
Start: 2024-04-17 | End: 1900-01-01

## 2024-04-17 RX ORDER — CETIRIZINE HYDROCHLORIDE 10 MG/1
10 CAPSULE, LIQUID FILLED ORAL
Refills: 0 | Status: ACTIVE | OUTPATIENT
Start: 2024-04-17 | End: 1900-01-01

## 2024-04-17 NOTE — PHYSICAL EXAM
[Alert] : alert [Oriented x 3] : ~L oriented x 3 [Well Nourished] : well nourished [Conjunctiva Non-injected] : conjunctiva non-injected [No Visual Lymphadenopathy] : no visual  lymphadenopathy [No Clubbing] : no clubbing [No Edema] : no edema [No Bromhidrosis] : no bromhidrosis [No Chromhidrosis] : no chromhidrosis [Declined] : declined [FreeTextEntry3] : waxy stuck on papule on L calf, mult on b/l breasts xerosis no appreciable rashes

## 2024-04-17 NOTE — HISTORY OF PRESENT ILLNESS
[FreeTextEntry1] : np growth [de-identified] : Ms. TESSY WOODS  is a 59 year old F w/ MCTD/necrotizing myopathy + SRP, elevated CK (2000s) + RF + RNP - on MMF.  #painful growth on L calf x 1 mo #bumps beneath breasts x yrs, asx #dry skin, has been itchy all over for many years, using lots of fragrance. on gabapentin. antihistamines didn't help in the past. no personal or family h/o skin cancer

## 2024-04-17 NOTE — CONSULT LETTER
[Dear  ___] : Dear  [unfilled], [Consult Letter:] : I had the pleasure of evaluating your patient, [unfilled]. [Please see my note below.] : Please see my note below. [Consult Closing:] : Thank you very much for allowing me to participate in the care of this patient.  If you have any questions, please do not hesitate to contact me. [Sincerely,] : Sincerely, [FreeTextEntry3] : Klaudia Patton MD Department of Dermatology [place] [facility]

## 2024-04-17 NOTE — ASSESSMENT
[FreeTextEntry1] : #iSK We have discussed the nature and usual course of these lesions. After obtaining verbal consent, 1 lesions were treated with liquid nitrogen for several seconds, with a ~10 second thaw interval for a total of 2 cycles. The patient was informed of the potential for erythema, blister formation, hypo- or hyperpigmentation, and scar at the site of treatment. wound care discussed - vaseline for any irritation/soreness/blistering. The patient tolerated the treatment well and knows to return if the lesions do not resolve.  #SKs - Counseled about clinically and dermatoscopically benign lesions - No treatment indicated at this time - Counseled patient to notify us of any changes  #xerosis dry skin care reviewed, handout provided. Switch to recommended products in handout and moisturize liberally.

## 2024-04-19 ENCOUNTER — TRANSCRIPTION ENCOUNTER (OUTPATIENT)
Age: 59
End: 2024-04-19

## 2024-05-06 ENCOUNTER — TRANSCRIPTION ENCOUNTER (OUTPATIENT)
Age: 59
End: 2024-05-06

## 2024-05-28 ENCOUNTER — APPOINTMENT (OUTPATIENT)
Dept: INTERNAL MEDICINE | Facility: CLINIC | Age: 59
End: 2024-05-28
Payer: COMMERCIAL

## 2024-05-28 ENCOUNTER — OUTPATIENT (OUTPATIENT)
Dept: OUTPATIENT SERVICES | Facility: HOSPITAL | Age: 59
LOS: 1 days | End: 2024-05-28

## 2024-05-28 VITALS
DIASTOLIC BLOOD PRESSURE: 85 MMHG | HEART RATE: 51 BPM | SYSTOLIC BLOOD PRESSURE: 136 MMHG | HEIGHT: 66 IN | WEIGHT: 146 LBS | BODY MASS INDEX: 23.46 KG/M2 | OXYGEN SATURATION: 100 %

## 2024-05-28 DIAGNOSIS — G72.89 OTHER SPECIFIED MYOPATHIES: ICD-10-CM

## 2024-05-28 DIAGNOSIS — I10 ESSENTIAL (PRIMARY) HYPERTENSION: ICD-10-CM

## 2024-05-28 DIAGNOSIS — K21.9 GASTRO-ESOPHAGEAL REFLUX DISEASE WITHOUT ESOPHAGITIS: ICD-10-CM

## 2024-05-28 DIAGNOSIS — F41.9 ANXIETY DISORDER, UNSPECIFIED: ICD-10-CM

## 2024-05-28 DIAGNOSIS — E78.5 HYPERLIPIDEMIA, UNSPECIFIED: ICD-10-CM

## 2024-05-28 DIAGNOSIS — Z98.51 TUBAL LIGATION STATUS: Chronic | ICD-10-CM

## 2024-05-28 DIAGNOSIS — Z98.89 OTHER SPECIFIED POSTPROCEDURAL STATES: Chronic | ICD-10-CM

## 2024-05-28 DIAGNOSIS — K21.9 GASTRO-ESOPHAGEAL REFLUX DISEASE W/OUT ESOPHAGITIS: ICD-10-CM

## 2024-05-28 PROCEDURE — 99214 OFFICE O/P EST MOD 30 MIN: CPT

## 2024-05-28 RX ORDER — CLONAZEPAM 0.5 MG/1
0.5 TABLET ORAL
Qty: 60 | Refills: 0 | Status: ACTIVE | COMMUNITY
Start: 2020-10-13 | End: 1900-01-01

## 2024-05-28 NOTE — PHYSICAL EXAM
[No Respiratory Distress] : no respiratory distress  [No Accessory Muscle Use] : no accessory muscle use [Clear to Auscultation] : lungs were clear to auscultation bilaterally [Normal Rate] : normal rate  [Regular Rhythm] : with a regular rhythm [Normal S1, S2] : normal S1 and S2 [No Murmur] : no murmur heard [Pedal Pulses Present] : the pedal pulses are present [No Edema] : there was no peripheral edema [Soft] : abdomen soft [Non Tender] : non-tender [Non-distended] : non-distended [Normal] : no joint swelling and grossly normal strength and tone [Normal Affect] : the affect was normal [Alert and Oriented x3] : oriented to person, place, and time [Normal Mood] : the mood was normal [Normal Insight/Judgement] : insight and judgment were intact

## 2024-05-29 ENCOUNTER — TRANSCRIPTION ENCOUNTER (OUTPATIENT)
Age: 59
End: 2024-05-29

## 2024-05-29 LAB
CHOLEST SERPL-MCNC: 166 MG/DL
HDLC SERPL-MCNC: 88 MG/DL
LDLC SERPL CALC-MCNC: 68 MG/DL
LDLC SERPL DIRECT ASSAY-MCNC: 59 MG/DL
NONHDLC SERPL-MCNC: 78 MG/DL
TRIGL SERPL-MCNC: 46 MG/DL

## 2024-05-30 NOTE — HISTORY OF PRESENT ILLNESS
[FreeTextEntry1] : Follow up visit for chronic medical conditions.  [de-identified] : The patient is a very pleasant 60 y/o F with PMHx of HTN, HLD, Prediabetes, GERD, emphysema, MCTD (mixed connective tissue disease), necrotizing myopathy, kidney stone, vit d insufficiency, B12 deficiency who presents today for a follow up visit for chronic medical conditions.  Patient is in her usual state of health and offers no medical concerns today.

## 2024-05-31 ENCOUNTER — TRANSCRIPTION ENCOUNTER (OUTPATIENT)
Age: 59
End: 2024-05-31

## 2024-06-10 ENCOUNTER — APPOINTMENT (OUTPATIENT)
Dept: ORTHOPEDIC SURGERY | Facility: CLINIC | Age: 59
End: 2024-06-10
Payer: COMMERCIAL

## 2024-06-10 DIAGNOSIS — S92.514A NONDISPLACED FRACTURE OF PROXIMAL PHALANX OF RIGHT LESSER TOE(S), INITIAL ENCOUNTER FOR CLOSED FRACTURE: ICD-10-CM

## 2024-06-10 PROCEDURE — 99203 OFFICE O/P NEW LOW 30 MIN: CPT

## 2024-06-10 PROCEDURE — 73630 X-RAY EXAM OF FOOT: CPT | Mod: RT

## 2024-06-10 NOTE — DISCUSSION/SUMMARY
[de-identified] : WBAT in hard sole shoe. Ice to affected area. Mono taping toes.  Repeat x-ray will be performed at the next office visit (right 4th toe).

## 2024-06-10 NOTE — PHYSICAL EXAM
[Moderate] : moderate swelling of toe(s) [4th] : 4th [NL (40)] : plantar flexion 40 degrees [5___] : [facility] 5[unfilled]/5 [2+] : posterior tibialis pulse: 2+ [Normal] : saphenous nerve sensation normal [] : patient ambulates without assistive device [Right] : right foot [Weight -] : weightbearing [de-identified] : Healing 4th toe proximal phalanx fracture with some callus formation. Achilles insertional and plantar calcaneal spurs. [[id number]] : dorsiflexion 15 degrees [de-identified] : inversion 20 degrees [de-identified] : eversion 15 degrees

## 2024-06-10 NOTE — HISTORY OF PRESENT ILLNESS
[de-identified] : Iza Brooks is here today for the right 4th toe and right foot, pt initially had furniture roll on her right foot in early May, 2024. She went to urgent care at that time and was told there was no fracture.  She continues to report pain.  She reports having a fracture in the right foot in 2018.  She is ambulating in sneakers without pain.

## 2024-06-17 PROBLEM — G72.89 NECROTIZING MYOPATHY: Status: ACTIVE | Noted: 2017-02-03

## 2024-06-17 RX ORDER — DICLOFENAC SODIUM 100 MG/1
100 TABLET, FILM COATED, EXTENDED RELEASE ORAL
Qty: 90 | Refills: 0 | Status: ACTIVE | COMMUNITY
Start: 2024-03-19 | End: 1900-01-01

## 2024-06-17 RX ORDER — OMEPRAZOLE 40 MG/1
40 CAPSULE, DELAYED RELEASE ORAL
Qty: 90 | Refills: 0 | Status: ACTIVE | COMMUNITY
Start: 2022-11-13 | End: 1900-01-01

## 2024-06-18 ENCOUNTER — APPOINTMENT (OUTPATIENT)
Dept: RHEUMATOLOGY | Facility: CLINIC | Age: 59
End: 2024-06-18
Payer: COMMERCIAL

## 2024-06-18 VITALS
HEIGHT: 66 IN | OXYGEN SATURATION: 98 % | TEMPERATURE: 98.1 F | BODY MASS INDEX: 22.98 KG/M2 | HEART RATE: 72 BPM | RESPIRATION RATE: 16 BRPM | DIASTOLIC BLOOD PRESSURE: 96 MMHG | SYSTOLIC BLOOD PRESSURE: 159 MMHG | WEIGHT: 143 LBS

## 2024-06-18 DIAGNOSIS — G72.89 OTHER SPECIFIED MYOPATHIES: ICD-10-CM

## 2024-06-18 PROCEDURE — 99214 OFFICE O/P EST MOD 30 MIN: CPT

## 2024-06-18 RX ORDER — DICLOFENAC SODIUM 50 MG/1
50 TABLET, DELAYED RELEASE ORAL
Qty: 28 | Refills: 0 | Status: DISCONTINUED | COMMUNITY
Start: 2023-09-08 | End: 2024-06-18

## 2024-06-18 NOTE — PHYSICAL EXAM
[General Appearance - Alert] : alert [General Appearance - In No Acute Distress] : in no acute distress [Sclera] : the sclera and conjunctiva were normal [Extraocular Movements] : extraocular movements were intact [Outer Ear] : the ears and nose were normal in appearance [Neck Appearance] : the appearance of the neck was normal [Neck Cervical Mass (___cm)] : no neck mass was observed [Jugular Venous Distention Increased] : there was no jugular-venous distention [Thyroid Diffuse Enlargement] : the thyroid was not enlarged [Thyroid Nodule] : there were no palpable thyroid nodules [Edema] : there was no peripheral edema [Skin Color & Pigmentation] : normal skin color and pigmentation [] : no rash [Oriented To Time, Place, And Person] : oriented to person, place, and time [Impaired Insight] : insight and judgment were intact [FreeTextEntry1] : no swollen joints and 5/5 UE/LE bilateral prox/distal

## 2024-06-18 NOTE — HISTORY OF PRESENT ILLNESS
[FreeTextEntry1] : 4-- notes she has joint aching (shoulders, hands, knees) with mild swelling (hands, knees).  feels like her body is swollen today and worst for the past 2 weeks. Nsaids (meloxicam or diclofenac) are no longer helpful. stopping cellcept today and starting Rituxan on Friday

## 2024-06-18 NOTE — DATA REVIEWED
[FreeTextEntry1] : Laboratory and radiology studies that were personally reviewed at today's visit are summarized in above and below: :  EMG - mild right ulnar neuropathy at the elbow and mild median neuropathy at the wrists.  ortho  note (11-8-2023):  "Right acromioclavicular and sternoclavicular views were obtained today that show no fracture, or dislocation. There is mild AC joint arthritis." MRI Thighs (8-2023): No muscular edema to suggest acute myositis. No significant muscular atrophy Mild fatty infiltration of the bilateral gluteus rubio and bilateral tensor fascia judah muscles.  CT chest ():  diffuse tree in bud nodules and GGO in the lower lungs and mild centrilobular emphysema.    Pulm note (12-28-22)  stable pf,t symptomatic improvement in ARAGON, no ILD On CT chest, DLCO improved on PFT.   Cards (12-20-22):  improved BP with change in meds.  MRI neck (9-2022):  multilevel spondylosis  PMR note - RAHEEM with relief.  dexa 4-2022:  Normal  ortho note (5-16-22):  reactive cervical myofascial pain - trigger point injections  7.20.16:  RF=50, ANJUM = 1:80, VBL=633, CK 1793 ortho note (8-22-22) cervical myofascial pain tx with trigger point injections.

## 2024-06-21 ENCOUNTER — APPOINTMENT (OUTPATIENT)
Dept: RHEUMATOLOGY | Facility: CLINIC | Age: 59
End: 2024-06-21
Payer: COMMERCIAL

## 2024-06-21 ENCOUNTER — LABORATORY RESULT (OUTPATIENT)
Age: 59
End: 2024-06-21

## 2024-06-21 VITALS
HEART RATE: 72 BPM | RESPIRATION RATE: 16 BRPM | OXYGEN SATURATION: 98 % | DIASTOLIC BLOOD PRESSURE: 77 MMHG | SYSTOLIC BLOOD PRESSURE: 125 MMHG

## 2024-06-21 VITALS
SYSTOLIC BLOOD PRESSURE: 130 MMHG | BODY MASS INDEX: 23.08 KG/M2 | TEMPERATURE: 97.7 F | DIASTOLIC BLOOD PRESSURE: 65 MMHG | OXYGEN SATURATION: 98 % | RESPIRATION RATE: 16 BRPM | HEART RATE: 69 BPM | WEIGHT: 143 LBS

## 2024-06-21 VITALS
RESPIRATION RATE: 16 BRPM | TEMPERATURE: 97.7 F | SYSTOLIC BLOOD PRESSURE: 100 MMHG | HEART RATE: 60 BPM | DIASTOLIC BLOOD PRESSURE: 66 MMHG | OXYGEN SATURATION: 98 %

## 2024-06-21 PROCEDURE — 96415 CHEMO IV INFUSION ADDL HR: CPT

## 2024-06-21 PROCEDURE — 36415 COLL VENOUS BLD VENIPUNCTURE: CPT

## 2024-06-21 PROCEDURE — 96374 THER/PROPH/DIAG INJ IV PUSH: CPT | Mod: 59

## 2024-06-21 PROCEDURE — 96413 CHEMO IV INFUSION 1 HR: CPT

## 2024-06-21 RX ORDER — METHYLPREDNISOLONE SODIUM SUCCINATE 125 MG/2ML
125 INJECTION, POWDER, FOR SOLUTION INTRAMUSCULAR; INTRAVENOUS
Qty: 0 | Refills: 0 | Status: COMPLETED
Start: 2024-04-17

## 2024-06-21 RX ORDER — RITUXIMAB 10 MG/ML
500 INJECTION, SOLUTION INTRAVENOUS
Qty: 0 | Refills: 0 | Status: COMPLETED
Start: 2024-04-17

## 2024-06-21 RX ORDER — CETIRIZINE HYDROCHLORIDE 10 MG/1
10 CAPSULE, LIQUID FILLED ORAL
Qty: 0 | Refills: 0 | Status: COMPLETED
Start: 2024-04-17

## 2024-06-21 NOTE — HISTORY OF PRESENT ILLNESS
[N/A] : N/A [Denies] : Denies [No] : No [Yes] : Yes [Declined] : Declined [Left upper extremity] : Left upper extremity [24g] : 24g [Start Time: ___] : Medication Start Time: [unfilled] [End Time: ___] : Medication End Time: [unfilled] [Medication Name: ___] : Medication Name: [unfilled] [Total Amount Administered: ___] : Total Amount Administered: [unfilled] [IV discontinued. Intact. No signs or symptoms of IV complications noted. Time: ___] : IV discontinued. Intact. No signs or symptoms of IV complications noted. Time: [unfilled] [Patient  instructed to seek medical attention with signs and symptoms of adverse effects] : Patient  instructed to seek medical attention with signs and symptoms of adverse effects [Patient left unit in no acute distress] : Patient left unit in no acute distress [Medications administered as ordered and tolerated well.] : Medications administered as ordered and tolerated well. [Blood drawn at time of visit] : Blood drawn at time of visit [Outside pharmacy] : Outside pharmacy [de-identified] : 6.5 [de-identified] : generalized pain [de-identified] : achy [de-identified] : 2019 [de-identified] : median cubital vein  [de-identified] : labs drawn as per ordered by Dr. Jones  [de-identified] : Patient presents for 1:2 Rituxan infusion, doing well overall. Patient denies any recent infection, antibiotic use or hospitalizations. Patient reports receiving Rituxan in 2019 and tolerated infusion well. Patient reports experiencing generalized pain rated a 6.5/10 on the pain scale above. Patient reports experiencing generalized stiffness that last all day. Patient denies any swelling. No other symptoms or concerns noted at this time. Patient pre-medicated, VS cycled and stable, infusion titrated and tolerated well.

## 2024-06-24 ENCOUNTER — TRANSCRIPTION ENCOUNTER (OUTPATIENT)
Age: 59
End: 2024-06-24

## 2024-06-24 LAB
ALBUMIN SERPL ELPH-MCNC: 4.7 G/DL
ALP BLD-CCNC: 65 U/L
ALT SERPL-CCNC: 11 U/L
ANION GAP SERPL CALC-SCNC: 15 MMOL/L
AST SERPL-CCNC: 15 U/L
BASOPHILS # BLD AUTO: 0.05 K/UL
BASOPHILS NFR BLD AUTO: 0.8 %
BILIRUB SERPL-MCNC: 0.5 MG/DL
BUN SERPL-MCNC: 11 MG/DL
CALCIUM SERPL-MCNC: 9.5 MG/DL
CHLORIDE SERPL-SCNC: 106 MMOL/L
CK SERPL-CCNC: 145 U/L
CO2 SERPL-SCNC: 24 MMOL/L
CREAT SERPL-MCNC: 0.73 MG/DL
EGFR: 94 ML/MIN/1.73M2
EOSINOPHIL # BLD AUTO: 0.08 K/UL
EOSINOPHIL NFR BLD AUTO: 1.3 %
HCT VFR BLD CALC: 39.3 %
HGB BLD-MCNC: 12.5 G/DL
IMM GRANULOCYTES NFR BLD AUTO: 0.2 %
LYMPHOCYTES # BLD AUTO: 2.41 K/UL
LYMPHOCYTES NFR BLD AUTO: 38.5 %
MAN DIFF?: NORMAL
MCHC RBC-ENTMCNC: 27.7 PG
MCHC RBC-ENTMCNC: 31.8 GM/DL
MCV RBC AUTO: 86.9 FL
MONOCYTES # BLD AUTO: 0.45 K/UL
MONOCYTES NFR BLD AUTO: 7.2 %
NEUTROPHILS # BLD AUTO: 3.26 K/UL
NEUTROPHILS NFR BLD AUTO: 52 %
PLATELET # BLD AUTO: NORMAL K/UL
POTASSIUM SERPL-SCNC: 4.2 MMOL/L
PROT SERPL-MCNC: 6.8 G/DL
RBC # BLD: 4.52 M/UL
RBC # FLD: 15.4 %
SODIUM SERPL-SCNC: 145 MMOL/L
WBC # FLD AUTO: 6.26 K/UL

## 2024-07-01 ENCOUNTER — TRANSCRIPTION ENCOUNTER (OUTPATIENT)
Age: 59
End: 2024-07-01

## 2024-07-09 ENCOUNTER — TRANSCRIPTION ENCOUNTER (OUTPATIENT)
Age: 59
End: 2024-07-09

## 2024-07-09 ENCOUNTER — NON-APPOINTMENT (OUTPATIENT)
Age: 59
End: 2024-07-09

## 2024-07-10 ENCOUNTER — APPOINTMENT (OUTPATIENT)
Dept: RHEUMATOLOGY | Facility: CLINIC | Age: 59
End: 2024-07-10
Payer: COMMERCIAL

## 2024-07-10 VITALS
DIASTOLIC BLOOD PRESSURE: 83 MMHG | SYSTOLIC BLOOD PRESSURE: 130 MMHG | HEART RATE: 59 BPM | OXYGEN SATURATION: 100 % | TEMPERATURE: 98.7 F

## 2024-07-10 VITALS
HEART RATE: 69 BPM | OXYGEN SATURATION: 98 % | DIASTOLIC BLOOD PRESSURE: 81 MMHG | TEMPERATURE: 98.7 F | SYSTOLIC BLOOD PRESSURE: 135 MMHG

## 2024-07-10 VITALS
DIASTOLIC BLOOD PRESSURE: 81 MMHG | TEMPERATURE: 97.8 F | OXYGEN SATURATION: 97 % | HEART RATE: 58 BPM | SYSTOLIC BLOOD PRESSURE: 121 MMHG

## 2024-07-10 PROCEDURE — 96415 CHEMO IV INFUSION ADDL HR: CPT

## 2024-07-10 PROCEDURE — 36415 COLL VENOUS BLD VENIPUNCTURE: CPT

## 2024-07-10 PROCEDURE — 96413 CHEMO IV INFUSION 1 HR: CPT

## 2024-07-10 PROCEDURE — 96374 THER/PROPH/DIAG INJ IV PUSH: CPT | Mod: 59

## 2024-07-10 RX ORDER — RITUXIMAB 10 MG/ML
500 INJECTION, SOLUTION INTRAVENOUS
Qty: 0 | Refills: 0 | Status: COMPLETED
Start: 2024-04-17

## 2024-07-10 RX ORDER — CETIRIZINE HYDROCHLORIDE 10 MG/1
10 CAPSULE, LIQUID FILLED ORAL
Qty: 0 | Refills: 0 | Status: COMPLETED
Start: 2024-04-17

## 2024-07-10 RX ORDER — METHYLPREDNISOLONE SODIUM SUCCINATE 125 MG/2ML
125 INJECTION, POWDER, FOR SOLUTION INTRAMUSCULAR; INTRAVENOUS
Qty: 0 | Refills: 0 | Status: COMPLETED
Start: 2024-04-17

## 2024-07-15 ENCOUNTER — APPOINTMENT (OUTPATIENT)
Dept: ORTHOPEDIC SURGERY | Facility: CLINIC | Age: 59
End: 2024-07-15
Payer: COMMERCIAL

## 2024-07-15 DIAGNOSIS — S92.514D NONDISPLACED FRACTURE OF PROXIMAL PHALANX OF RIGHT LESSER TOE(S), SUBSEQUENT ENCOUNTER FOR FRACTURE WITH ROUTINE HEALING: ICD-10-CM

## 2024-07-15 PROCEDURE — 99213 OFFICE O/P EST LOW 20 MIN: CPT

## 2024-07-18 ENCOUNTER — APPOINTMENT (OUTPATIENT)
Dept: CARDIOLOGY | Facility: CLINIC | Age: 59
End: 2024-07-18
Payer: COMMERCIAL

## 2024-07-18 ENCOUNTER — NON-APPOINTMENT (OUTPATIENT)
Age: 59
End: 2024-07-18

## 2024-07-18 VITALS
HEIGHT: 66 IN | DIASTOLIC BLOOD PRESSURE: 63 MMHG | BODY MASS INDEX: 23.14 KG/M2 | HEART RATE: 42 BPM | SYSTOLIC BLOOD PRESSURE: 117 MMHG | WEIGHT: 144 LBS | OXYGEN SATURATION: 100 %

## 2024-07-18 DIAGNOSIS — R06.09 OTHER FORMS OF DYSPNEA: ICD-10-CM

## 2024-07-18 DIAGNOSIS — I25.10 ATHEROSCLEROTIC HEART DISEASE OF NATIVE CORONARY ARTERY W/OUT ANGINA PECTORIS: ICD-10-CM

## 2024-07-18 DIAGNOSIS — E78.5 HYPERLIPIDEMIA, UNSPECIFIED: ICD-10-CM

## 2024-07-18 DIAGNOSIS — I27.20 PULMONARY HYPERTENSION, UNSPECIFIED: ICD-10-CM

## 2024-07-18 DIAGNOSIS — R06.02 SHORTNESS OF BREATH: ICD-10-CM

## 2024-07-18 DIAGNOSIS — I10 ESSENTIAL (PRIMARY) HYPERTENSION: ICD-10-CM

## 2024-07-18 DIAGNOSIS — R07.9 CHEST PAIN, UNSPECIFIED: ICD-10-CM

## 2024-07-18 DIAGNOSIS — R07.89 OTHER CHEST PAIN: ICD-10-CM

## 2024-07-18 PROCEDURE — 99214 OFFICE O/P EST MOD 30 MIN: CPT | Mod: 25

## 2024-07-18 PROCEDURE — 93000 ELECTROCARDIOGRAM COMPLETE: CPT

## 2024-07-18 PROCEDURE — G2211 COMPLEX E/M VISIT ADD ON: CPT | Mod: NC

## 2024-08-13 ENCOUNTER — APPOINTMENT (OUTPATIENT)
Dept: RHEUMATOLOGY | Facility: CLINIC | Age: 59
End: 2024-08-13
Payer: COMMERCIAL

## 2024-08-13 VITALS
HEART RATE: 56 BPM | OXYGEN SATURATION: 98 % | SYSTOLIC BLOOD PRESSURE: 135 MMHG | DIASTOLIC BLOOD PRESSURE: 79 MMHG | RESPIRATION RATE: 16 BRPM | BODY MASS INDEX: 23.3 KG/M2 | HEIGHT: 66 IN | TEMPERATURE: 98.1 F | WEIGHT: 145 LBS

## 2024-08-13 DIAGNOSIS — G72.89 OTHER SPECIFIED MYOPATHIES: ICD-10-CM

## 2024-08-13 LAB
25(OH)D3 SERPL-MCNC: 39 NG/ML
ALBUMIN SERPL ELPH-MCNC: 4.5 G/DL
ALP BLD-CCNC: 78 U/L
ALT SERPL-CCNC: 19 U/L
ANION GAP SERPL CALC-SCNC: 14 MMOL/L
AST SERPL-CCNC: 16 U/L
BILIRUB SERPL-MCNC: 0.4 MG/DL
BUN SERPL-MCNC: 12 MG/DL
CALCIUM SERPL-MCNC: 10 MG/DL
CD16+CD56+ CELLS # BLD: 324 CELLS/UL
CD16+CD56+ CELLS NFR BLD: 17 %
CD19 CELLS NFR BLD: 0 CELLS/UL
CD3 CELLS # BLD: 1597 CELLS/UL
CD3 CELLS NFR BLD: 82 %
CD3+CD4+ CELLS # BLD: 1301 CELLS/UL
CD3+CD4+ CELLS NFR BLD: 66 %
CD3+CD4+ CELLS/CD3+CD8+ CLL SPEC: 5.07 RATIO
CD3+CD8+ CELLS # SPEC: 257 CELLS/UL
CD3+CD8+ CELLS NFR BLD: 13 %
CELLS.CD3-CD19+/CELLS IN BLOOD: <1 %
CHLORIDE SERPL-SCNC: 102 MMOL/L
CK SERPL-CCNC: 98 U/L
CO2 SERPL-SCNC: 24 MMOL/L
CREAT SERPL-MCNC: 0.71 MG/DL
CRP SERPL-MCNC: <3 MG/L
EGFR: 97 ML/MIN/1.73M2
GLUCOSE SERPL-MCNC: 91 MG/DL
IGA SER QL IEP: 201 MG/DL
IGG SER QL IEP: 1088 MG/DL
IGM SER QL IEP: 58 MG/DL
MYOGLOBIN SERPL-MCNC: <21 NG/ML
POTASSIUM SERPL-SCNC: 4.5 MMOL/L
PROT SERPL-MCNC: 7.1 G/DL
SODIUM SERPL-SCNC: 141 MMOL/L

## 2024-08-13 PROCEDURE — 99215 OFFICE O/P EST HI 40 MIN: CPT

## 2024-08-13 NOTE — HISTORY OF PRESENT ILLNESS
[FreeTextEntry1] : 8-:  no shortness of breath, no cough, variable muscle strength - some days are better than other days and overall, fully able to do ADL even on bad days, energy level is low, had an episode of chest pain on vacation and pending cardiac workup. + hair thinning, some joint swelling in fingers at times and sometimes left knee is painful.  no sun sensitivity.

## 2024-08-13 NOTE — DATA REVIEWED
[FreeTextEntry1] : Laboratory and radiology studies that were personally reviewed at today's visit are summarized in above and below: :  EMG - mild right ulnar neuropathy at the elbow and mild median neuropathy at the wrists.  ortho  note (11-8-2023):  "Right acromioclavicular and sternoclavicular views were obtained today that show no fracture, or dislocation. There is mild AC joint arthritis." MRI Thighs (8-2023): No muscular edema to suggest acute myositis. No significant muscular atrophy Mild fatty infiltration of the bilateral gluteus rubio and bilateral tensor fascia judah muscles.  CT chest ():  diffuse tree in bud nodules and GGO in the lower lungs and mild centrilobular emphysema.    Pulm note (12-28-22)  stable pf,t symptomatic improvement in ARAGON, no ILD On CT chest, DLCO improved on PFT.   Cards (12-20-22):  improved BP with change in meds.  MRI neck (9-2022):  multilevel spondylosis  PMR note - RAHEEM with relief.  dexa 4-2022:  Normal  ortho note (5-16-22):  reactive cervical myofascial pain - trigger point injections  7.20.16:  RF=50, ANJUM = 1:80, RRG=518, CK 1793 ortho note (8-22-22) cervical myofascial pain tx with trigger point injections.

## 2024-08-13 NOTE — ASSESSMENT
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 here for follow up for necrotizing myopathy/UCTD and Joint Pain (OA) and aymetric clavicle   #necrotizing myopathy (SRP positive) - Will check laboratory tests to look for markers of disease activity and also to assess for medication toxicity. -  cxr WNL  - MMF stopped in 6-2024 and Rituxan most recently in 6-2024.   #mononeuropathy of UE b/l - gabapentin as above mg TID   - follow up neurology  #secondary fibromyalgia - poor sleep hygiene, multiple tender points, anxiety - not currently active   #structural back disease - improved and not active  # GERD - improved and not active , continue omprazole.   # clavicle assymetry  2024 MRI with djd right SC joint and mild degen change of bilateral AC joints    # joint pain  continue diclofenac   # chest pain  follow up cardiac workup planned  continue asa 81 and repatha   HCM Prevnar in dec 2019 and pneumovax in march 2020 Moderna x 2 (2-2021) and booster in 8-2021 and to get booster in fall 2022 (bivalent) Flu shot fall 2023   [facility] DeXA 4-2022 - normal Continue calcium total 1200 mg. repeat dexa in 4-2025   More than 50% of the encounter was spent counseling TESSY HERNANDEZ on the differential, workup, disease course, and treatment/management.   Education was provided to TESSY HERNANDEZ during this encounter. All questions and concerns were answered and addressed in detail.  TESSY HERNANDEZ verbalized understanding and agreed to the plan.   TESSY HERNANDEZ has been instructed to call for an earlier appointment if new symptoms develop in the interim. TESSY HERNANDEZ has been instructed to make a follow-up appointment in 6 weeks

## 2024-08-14 ENCOUNTER — TRANSCRIPTION ENCOUNTER (OUTPATIENT)
Age: 59
End: 2024-08-14

## 2024-08-14 LAB
ALDOLASE SERPL-CCNC: 6.6 U/L
BASOPHILS # BLD AUTO: 0.04 K/UL
BASOPHILS NFR BLD AUTO: 0.6 %
EOSINOPHIL # BLD AUTO: 0.09 K/UL
EOSINOPHIL NFR BLD AUTO: 1.4 %
ERYTHROCYTE [SEDIMENTATION RATE] IN BLOOD BY WESTERGREN METHOD: 28 MM/HR
HCT VFR BLD CALC: 43.4 %
HGB BLD-MCNC: 13.1 G/DL
IMM GRANULOCYTES NFR BLD AUTO: 0.2 %
LYMPHOCYTES # BLD AUTO: 2.1 K/UL
LYMPHOCYTES NFR BLD AUTO: 33.5 %
MAN DIFF?: NORMAL
MCHC RBC-ENTMCNC: 27.2 PG
MCHC RBC-ENTMCNC: 30.2 GM/DL
MCV RBC AUTO: 90.2 FL
MONOCYTES # BLD AUTO: 0.5 K/UL
MONOCYTES NFR BLD AUTO: 8 %
NEUTROPHILS # BLD AUTO: 3.52 K/UL
NEUTROPHILS NFR BLD AUTO: 56.3 %
PLATELET # BLD AUTO: 246 K/UL
RBC # BLD: 4.81 M/UL
RBC # FLD: 14.6 %
WBC # FLD AUTO: 6.26 K/UL

## 2024-09-11 ENCOUNTER — RESULT REVIEW (OUTPATIENT)
Age: 59
End: 2024-09-11

## 2024-09-11 ENCOUNTER — APPOINTMENT (OUTPATIENT)
Dept: CV DIAGNOSTICS | Facility: HOSPITAL | Age: 59
End: 2024-09-11

## 2024-09-11 ENCOUNTER — APPOINTMENT (OUTPATIENT)
Dept: CV DIAGNOSITCS | Facility: HOSPITAL | Age: 59
End: 2024-09-11

## 2024-09-11 ENCOUNTER — OUTPATIENT (OUTPATIENT)
Dept: OUTPATIENT SERVICES | Facility: HOSPITAL | Age: 59
LOS: 1 days | End: 2024-09-11
Payer: COMMERCIAL

## 2024-09-11 DIAGNOSIS — Z98.890 OTHER SPECIFIED POSTPROCEDURAL STATES: Chronic | ICD-10-CM

## 2024-09-11 DIAGNOSIS — Z98.89 OTHER SPECIFIED POSTPROCEDURAL STATES: Chronic | ICD-10-CM

## 2024-09-11 DIAGNOSIS — R07.9 CHEST PAIN, UNSPECIFIED: ICD-10-CM

## 2024-09-11 DIAGNOSIS — I25.10 ATHEROSCLEROTIC HEART DISEASE OF NATIVE CORONARY ARTERY WITHOUT ANGINA PECTORIS: ICD-10-CM

## 2024-09-11 PROCEDURE — 93306 TTE W/DOPPLER COMPLETE: CPT | Mod: 26

## 2024-09-11 PROCEDURE — 93018 CV STRESS TEST I&R ONLY: CPT | Mod: GC,MC

## 2024-09-11 PROCEDURE — 78451 HT MUSCLE IMAGE SPECT SING: CPT | Mod: 26,MC

## 2024-09-11 PROCEDURE — 93016 CV STRESS TEST SUPVJ ONLY: CPT | Mod: GC,MC

## 2024-09-12 ENCOUNTER — TRANSCRIPTION ENCOUNTER (OUTPATIENT)
Age: 59
End: 2024-09-12

## 2024-09-16 ENCOUNTER — RX RENEWAL (OUTPATIENT)
Age: 59
End: 2024-09-16

## 2024-09-17 ENCOUNTER — APPOINTMENT (OUTPATIENT)
Dept: OBGYN | Facility: CLINIC | Age: 59
End: 2024-09-17
Payer: COMMERCIAL

## 2024-09-17 ENCOUNTER — ASOB RESULT (OUTPATIENT)
Age: 59
End: 2024-09-17

## 2024-09-17 VITALS
HEART RATE: 54 BPM | WEIGHT: 144 LBS | DIASTOLIC BLOOD PRESSURE: 80 MMHG | SYSTOLIC BLOOD PRESSURE: 140 MMHG | HEIGHT: 66 IN | BODY MASS INDEX: 23.14 KG/M2

## 2024-09-17 DIAGNOSIS — R10.2 PELVIC AND PERINEAL PAIN: ICD-10-CM

## 2024-09-17 DIAGNOSIS — Z01.419 ENCOUNTER FOR GYNECOLOGICAL EXAMINATION (GENERAL) (ROUTINE) W/OUT ABNORMAL FINDINGS: ICD-10-CM

## 2024-09-17 PROCEDURE — 96127 BRIEF EMOTIONAL/BEHAV ASSMT: CPT

## 2024-09-17 PROCEDURE — 99459 PELVIC EXAMINATION: CPT

## 2024-09-17 PROCEDURE — 76830 TRANSVAGINAL US NON-OB: CPT

## 2024-09-17 PROCEDURE — 76857 US EXAM PELVIC LIMITED: CPT | Mod: 59

## 2024-09-17 PROCEDURE — 99396 PREV VISIT EST AGE 40-64: CPT

## 2024-09-17 NOTE — SIGNATURES
[TextEntry] : This note was written by Kathy Hutchins on 09/17/2024 actively solely PEDRO Jerry M.D 09/17/2024 . All medical record entries made by this scribe were at my  PEDRO Jerry M.D  direction and personally dictated by me on 09/17/2024 . I have personally reviewed my chart and agree that the record reflects my personal performance of the history, physical exam, assessment, and plan.

## 2024-09-17 NOTE — PHYSICAL EXAM
[Chaperone Present] : A chaperone was present in the examining room during all aspects of the physical examination [Appropriately responsive] : appropriately responsive [Alert] : alert [No Acute Distress] : no acute distress [No Lymphadenopathy] : no lymphadenopathy [Soft] : soft [Non-tender] : non-tender [Non-distended] : non-distended [No HSM] : No HSM [No Lesions] : no lesions [No Mass] : no mass [Oriented x3] : oriented x3 [Examination Of The Breasts] : a normal appearance [No Masses] : no breast masses were palpable [Labia Majora] : normal [Labia Minora] : normal [Normal] : normal [Uterine Adnexae] : normal [66999] : A chaperone was present during the pelvic exam. [FreeTextEntry2] : SAMSON Ramos

## 2024-09-17 NOTE — HISTORY OF PRESENT ILLNESS
[Y] : Patient is sexually active [Post-Menopause, No Sxs] : post-menopausal, currently without symptoms [FreeTextEntry1] : 59 year old P2 LMP 2014 presents for annual gyn visit.  Pt c/o suprapubic pain and discomfort.  Pain is sometimes sharp.  Pain started in mid June.  No other alleviating or aggravating factors.  Pt reports having a colonoscopy in Aug and the results were WNL. She denies PMB, itching, burning, abnormal discharge, or issues with urination or bowel movements.   [Mammogramdate] : 06/24 [BreastSonogramDate] : 06/24 [PapSmeardate] : 08/23 [BoneDensityDate] : 2023 [ColonoscopyDate] : 2024

## 2024-09-17 NOTE — PLAN
[FreeTextEntry1] : 59 year old P2 LMP 2014 presents for annual gyn visit.  HPV/Pap performed today Referral for TVUS given for suprapubic abdominal pain RTO 1 year for annual

## 2024-10-01 ENCOUNTER — OUTPATIENT (OUTPATIENT)
Dept: OUTPATIENT SERVICES | Facility: HOSPITAL | Age: 59
LOS: 1 days | End: 2024-10-01
Payer: COMMERCIAL

## 2024-10-01 ENCOUNTER — APPOINTMENT (OUTPATIENT)
Dept: CT IMAGING | Facility: CLINIC | Age: 59
End: 2024-10-01
Payer: COMMERCIAL

## 2024-10-01 DIAGNOSIS — Z98.89 OTHER SPECIFIED POSTPROCEDURAL STATES: Chronic | ICD-10-CM

## 2024-10-01 DIAGNOSIS — Z98.890 OTHER SPECIFIED POSTPROCEDURAL STATES: Chronic | ICD-10-CM

## 2024-10-01 DIAGNOSIS — R10.84 GENERALIZED ABDOMINAL PAIN: ICD-10-CM

## 2024-10-01 DIAGNOSIS — Z98.51 TUBAL LIGATION STATUS: Chronic | ICD-10-CM

## 2024-10-01 PROCEDURE — 74177 CT ABD & PELVIS W/CONTRAST: CPT | Mod: 26

## 2024-10-01 PROCEDURE — 74177 CT ABD & PELVIS W/CONTRAST: CPT

## 2024-10-06 ENCOUNTER — EMERGENCY (EMERGENCY)
Facility: HOSPITAL | Age: 59
LOS: 1 days | Discharge: HOME | End: 2024-10-06
Attending: EMERGENCY MEDICINE | Admitting: EMERGENCY MEDICINE
Payer: SELF-PAY

## 2024-10-06 VITALS
HEART RATE: 80 BPM | HEIGHT: 66 IN | OXYGEN SATURATION: 99 % | DIASTOLIC BLOOD PRESSURE: 96 MMHG | SYSTOLIC BLOOD PRESSURE: 205 MMHG | TEMPERATURE: 98 F | RESPIRATION RATE: 15 BRPM | WEIGHT: 143.96 LBS

## 2024-10-06 DIAGNOSIS — Z98.89 OTHER SPECIFIED POSTPROCEDURAL STATES: Chronic | ICD-10-CM

## 2024-10-06 DIAGNOSIS — Z98.890 OTHER SPECIFIED POSTPROCEDURAL STATES: Chronic | ICD-10-CM

## 2024-10-06 DIAGNOSIS — Z98.51 TUBAL LIGATION STATUS: Chronic | ICD-10-CM

## 2024-10-06 PROCEDURE — 72100 X-RAY EXAM L-S SPINE 2/3 VWS: CPT

## 2024-10-06 PROCEDURE — 99283 EMERGENCY DEPT VISIT LOW MDM: CPT

## 2024-10-06 PROCEDURE — 72100 X-RAY EXAM L-S SPINE 2/3 VWS: CPT | Mod: 26

## 2024-10-06 PROCEDURE — 99284 EMERGENCY DEPT VISIT MOD MDM: CPT

## 2024-10-06 RX ORDER — LIDOCAINE 50 MG/G
1 CREAM TOPICAL ONCE
Refills: 0 | Status: COMPLETED | OUTPATIENT
Start: 2024-10-06 | End: 2024-10-06

## 2024-10-06 RX ORDER — CYCLOBENZAPRINE HCL 10 MG
10 TABLET ORAL ONCE
Refills: 0 | Status: COMPLETED | OUTPATIENT
Start: 2024-10-06 | End: 2024-10-06

## 2024-10-06 RX ORDER — CYCLOBENZAPRINE HCL 10 MG
1 TABLET ORAL
Qty: 12 | Refills: 0
Start: 2024-10-06 | End: 2024-10-09

## 2024-10-06 RX ADMIN — Medication 400 MILLIGRAM(S): at 09:24

## 2024-10-06 RX ADMIN — Medication 10 MILLIGRAM(S): at 09:23

## 2024-10-06 RX ADMIN — LIDOCAINE 1 PATCH: 50 CREAM TOPICAL at 09:24

## 2024-10-06 NOTE — ED PROVIDER NOTE - NSFOLLOWUPINSTRUCTIONS_ED_ALL_ED_FT
ACUTE LOW BACK PAIN - [facility](R) Instructions(ER/ED)    Acute Low Back Pain    WHAT YOU NEED TO KNOW:    Acute low back pain is sudden discomfort that lasts up to 6 weeks and makes activity difficult.    DISCHARGE INSTRUCTIONS:    Return to the emergency department if:    You have severe pain.    You have sudden stiffness and heaviness on both buttocks down to both legs.    You have numbness or weakness in one leg, or pain in both legs.    You have numbness in your genital area or across your lower back.    You cannot control your urine or bowel movements.  Call your doctor if:    You have a fever.    You have pain at night or when you rest.    Your pain does not get better with treatment.    You have pain that worsens when you cough or sneeze.    You suddenly feel something pop or snap in your back.    You have questions or concerns about your condition or care.  Medicines: You may need any of the following:    NSAIDs, such as ibuprofen, help decrease swelling, pain, and fever. This medicine is available with or without a doctor's order. NSAIDs can cause stomach bleeding or kidney problems in certain people. If you take blood thinner medicine, always ask your healthcare provider if NSAIDs are safe for you. Always read the medicine label and follow directions.    Acetaminophen decreases pain and fever. It is available without a doctor's order. Ask how much to take and how often to take it. Follow directions. Read the labels of all other medicines you are using to see if they also contain acetaminophen, or ask your doctor or pharmacist. Acetaminophen can cause liver damage if not taken correctly.    Muscle relaxers decrease pain by relaxing the muscles in your lower spine.    Prescription pain medicine may be given. Ask your healthcare provider how to take this medicine safely. Some prescription pain medicines contain acetaminophen. Do not take other medicines that contain acetaminophen without talking to your healthcare provider. Too much acetaminophen may cause liver damage. Prescription pain medicine may cause constipation. Ask your healthcare provider how to prevent or treat constipation.  Self-care:    Stay active as much as you can without causing more pain. Bed rest could make your back pain worse. Start with some light exercises, such as walking. Avoid heavy lifting until your pain is gone. Ask for more information about the activities or exercises that are right for you.      Apply heat on your back for 20 to 30 minutes every 2 hours for as many days as directed. Heat helps decrease pain and muscle spasms. Alternate heat and ice.    Apply ice on your back for 15 to 20 minutes every hour or as directed. Use an ice pack, or put crushed ice in a plastic bag. Cover it with a towel before you apply it to your skin. Ice helps prevent tissue damage and decreases swelling and pain.  Prevent acute low back pain:    Use proper body mechanics.  Bend at the hips and knees when you  objects. Do not bend from the waist. Use your leg muscles as you lift the load. Do not use your back. Keep the object close to your chest as you lift it. Try not to twist or lift anything above your waist.  How to Lift Items Safely      Change your position often when you stand for long periods of time. Rest one foot on a small box or footrest, and then switch to the other foot often.    Try not to sit for long periods of time. When you do, sit in a straight-backed chair with your feet flat on the floor. Never reach, pull, or push while you are sitting.    Do exercises that strengthen your back muscles. Warm up before you exercise. Ask your healthcare provider the best exercises for you.  Warm up and Cool Down       Maintain a healthy weight. Ask your healthcare provider what a healthy weight is for you. Ask him or her to help you create a weight loss plan if you are overweight.  Follow up with your doctor as directed: Return for a follow-up visit if you still have pain after 1 to 3 weeks of treatment. You may need to visit an orthopedist if your back pain lasts longer than 12 weeks. Write down your questions so you remember to ask them during your visits.
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
## 2024-10-06 NOTE — ED PROVIDER NOTE - CARE PROVIDER_API CALL
Chun Sampson Jean-Claude  Orthopaedic Surgery  [id number]  [place] [address]  Phone: [phone]  Fax: [phone]  Follow Up Time: 1-3 Days
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
## 2024-10-06 NOTE — ED PROVIDER NOTE - PATIENT PORTAL LINK FT
You can access the FollowMyHealth Patient Portal offered by [facility] by registering at the following website: [email or web address]. By joining [facility]’s FollowMyHealth portal, you will also be able to view your health information using other applications (apps) compatible with our system.

## 2024-10-06 NOTE — ED PROVIDER NOTE - NSICDXFAMILYHX_GEN_ALL_CORE_FT
FAMILY HISTORY:  Father  Still living? Unknown  Family history of hypertension, Age at diagnosis: Age Unknown    Sibling  Still living? Unknown  Family history of hypertension, Age at diagnosis: Age Unknown    Grandparent  Still living? Unknown  Family history of diabetes mellitus, Age at diagnosis: Age Unknown

## 2024-10-06 NOTE — ED PROVIDER NOTE - PROGRESS NOTE DETAILS
Reevaluated patient at bedside.  Patient feeling well.  Discussed the results of x-ray.   An opportunity to ask questions was given.  Discussed the importance of prompt, close medical follow-up.  Patient will return with any changes, concerns or persistent / worsening symptoms.  Understanding of all instructions verbalized.

## 2024-10-06 NOTE — ED PROVIDER NOTE - NSICDXPASTSURGICALHX_GEN_ALL_CORE_FT
PAST SURGICAL HISTORY:  H/O surgical biopsy Thigh biopsy    H/O tubal ligation     History of lumpectomy of right breast

## 2024-10-06 NOTE — ED PROVIDER NOTE - NSICDXPASTMEDICALHX_GEN_ALL_CORE_FT
PAST MEDICAL HISTORY:  Connective tissue disease     GERD (gastroesophageal reflux disease)     Hypertension     Irritable bowel syndrome     Myopathy

## 2024-10-22 ENCOUNTER — OUTPATIENT (OUTPATIENT)
Dept: OUTPATIENT SERVICES | Facility: HOSPITAL | Age: 59
LOS: 1 days | End: 2024-10-22

## 2024-10-22 ENCOUNTER — APPOINTMENT (OUTPATIENT)
Dept: INTERNAL MEDICINE | Facility: CLINIC | Age: 59
End: 2024-10-22
Payer: COMMERCIAL

## 2024-10-22 VITALS
SYSTOLIC BLOOD PRESSURE: 130 MMHG | HEIGHT: 66 IN | DIASTOLIC BLOOD PRESSURE: 90 MMHG | BODY MASS INDEX: 22.84 KG/M2 | WEIGHT: 142.13 LBS

## 2024-10-22 DIAGNOSIS — Z02.89 ENCOUNTER FOR OTHER ADMINISTRATIVE EXAMINATIONS: ICD-10-CM

## 2024-10-22 DIAGNOSIS — M21.619 BUNION OF UNSPECIFIED FOOT: ICD-10-CM

## 2024-10-22 DIAGNOSIS — I10 ESSENTIAL (PRIMARY) HYPERTENSION: ICD-10-CM

## 2024-10-22 DIAGNOSIS — F41.9 ANXIETY DISORDER, UNSPECIFIED: ICD-10-CM

## 2024-10-22 DIAGNOSIS — E78.5 HYPERLIPIDEMIA, UNSPECIFIED: ICD-10-CM

## 2024-10-22 DIAGNOSIS — Z23 ENCOUNTER FOR IMMUNIZATION: ICD-10-CM

## 2024-10-22 DIAGNOSIS — Z98.89 OTHER SPECIFIED POSTPROCEDURAL STATES: Chronic | ICD-10-CM

## 2024-10-22 DIAGNOSIS — Z98.890 OTHER SPECIFIED POSTPROCEDURAL STATES: Chronic | ICD-10-CM

## 2024-10-22 DIAGNOSIS — Z98.51 TUBAL LIGATION STATUS: Chronic | ICD-10-CM

## 2024-10-22 DIAGNOSIS — K21.9 GASTRO-ESOPHAGEAL REFLUX DISEASE W/OUT ESOPHAGITIS: ICD-10-CM

## 2024-10-22 DIAGNOSIS — G72.89 OTHER SPECIFIED MYOPATHIES: ICD-10-CM

## 2024-10-22 PROCEDURE — 99214 OFFICE O/P EST MOD 30 MIN: CPT

## 2024-10-23 ENCOUNTER — TRANSCRIPTION ENCOUNTER (OUTPATIENT)
Age: 59
End: 2024-10-23

## 2024-10-24 ENCOUNTER — TRANSCRIPTION ENCOUNTER (OUTPATIENT)
Age: 59
End: 2024-10-24

## 2024-10-24 DIAGNOSIS — E78.5 HYPERLIPIDEMIA, UNSPECIFIED: ICD-10-CM

## 2024-10-24 DIAGNOSIS — I10 ESSENTIAL (PRIMARY) HYPERTENSION: ICD-10-CM

## 2024-10-24 DIAGNOSIS — K21.9 GASTRO-ESOPHAGEAL REFLUX DISEASE WITHOUT ESOPHAGITIS: ICD-10-CM

## 2024-10-24 DIAGNOSIS — Z23 ENCOUNTER FOR IMMUNIZATION: ICD-10-CM

## 2024-10-24 DIAGNOSIS — G72.89 OTHER SPECIFIED MYOPATHIES: ICD-10-CM

## 2024-10-24 DIAGNOSIS — M21.619 BUNION OF UNSPECIFIED FOOT: ICD-10-CM

## 2024-10-24 DIAGNOSIS — F41.9 ANXIETY DISORDER, UNSPECIFIED: ICD-10-CM

## 2024-10-24 DIAGNOSIS — Z02.89 ENCOUNTER FOR OTHER ADMINISTRATIVE EXAMINATIONS: ICD-10-CM

## 2024-10-25 ENCOUNTER — TRANSCRIPTION ENCOUNTER (OUTPATIENT)
Age: 59
End: 2024-10-25

## 2024-12-02 ENCOUNTER — TRANSCRIPTION ENCOUNTER (OUTPATIENT)
Age: 59
End: 2024-12-02

## 2024-12-03 ENCOUNTER — LABORATORY RESULT (OUTPATIENT)
Age: 59
End: 2024-12-03

## 2024-12-03 ENCOUNTER — APPOINTMENT (OUTPATIENT)
Dept: RHEUMATOLOGY | Facility: CLINIC | Age: 59
End: 2024-12-03
Payer: COMMERCIAL

## 2024-12-03 VITALS
RESPIRATION RATE: 16 BRPM | WEIGHT: 144 LBS | SYSTOLIC BLOOD PRESSURE: 141 MMHG | OXYGEN SATURATION: 98 % | HEIGHT: 66 IN | TEMPERATURE: 98.1 F | HEART RATE: 73 BPM | DIASTOLIC BLOOD PRESSURE: 91 MMHG | BODY MASS INDEX: 23.14 KG/M2

## 2024-12-03 LAB — MYOGLOBIN SERPL-MCNC: <21 NG/ML

## 2024-12-03 PROCEDURE — 99214 OFFICE O/P EST MOD 30 MIN: CPT

## 2024-12-03 PROCEDURE — G2211 COMPLEX E/M VISIT ADD ON: CPT | Mod: NC

## 2024-12-03 RX ORDER — MELOXICAM 7.5 MG/1
7.5 TABLET ORAL
Refills: 0 | Status: ACTIVE | COMMUNITY
Start: 2024-12-03

## 2024-12-04 ENCOUNTER — TRANSCRIPTION ENCOUNTER (OUTPATIENT)
Age: 59
End: 2024-12-04

## 2024-12-04 DIAGNOSIS — G72.89 OTHER SPECIFIED MYOPATHIES: ICD-10-CM

## 2024-12-04 LAB
ALBUMIN SERPL ELPH-MCNC: 4.4 G/DL
ALDOLASE SERPL-CCNC: 4.6 U/L
ALP BLD-CCNC: 74 U/L
ALT SERPL-CCNC: 15 U/L
ANION GAP SERPL CALC-SCNC: 12 MMOL/L
AST SERPL-CCNC: 19 U/L
BASOPHILS # BLD AUTO: 0.03 K/UL
BASOPHILS NFR BLD AUTO: 0.4 %
BILIRUB SERPL-MCNC: 0.5 MG/DL
BUN SERPL-MCNC: 17 MG/DL
CALCIUM SERPL-MCNC: 9.5 MG/DL
CD16+CD56+ CELLS # BLD: 461 CELLS/UL
CD16+CD56+ CELLS NFR BLD: 21 %
CD19 CELLS NFR BLD: 1 CELLS/UL
CD3 CELLS # BLD: 1754 CELLS/UL
CD3 CELLS NFR BLD: 77 %
CD3+CD4+ CELLS # BLD: 1449 CELLS/UL
CD3+CD4+ CELLS NFR BLD: 63 %
CD3+CD4+ CELLS/CD3+CD8+ CLL SPEC: 4.95 RATIO
CD3+CD8+ CELLS # SPEC: 293 CELLS/UL
CD3+CD8+ CELLS NFR BLD: 13 %
CELLS.CD3-CD19+/CELLS IN BLOOD: <1 %
CHLORIDE SERPL-SCNC: 107 MMOL/L
CK SERPL-CCNC: 121 U/L
CO2 SERPL-SCNC: 26 MMOL/L
CREAT SERPL-MCNC: 0.61 MG/DL
EGFR: 102 ML/MIN/1.73M2
EOSINOPHIL # BLD AUTO: 0.08 K/UL
EOSINOPHIL NFR BLD AUTO: 1.1 %
GLUCOSE SERPL-MCNC: 88 MG/DL
HCT VFR BLD CALC: 42.6 %
HGB BLD-MCNC: 13.1 G/DL
IGA SER QL IEP: 171 MG/DL
IGG SER QL IEP: 1013 MG/DL
IGM SER QL IEP: 51 MG/DL
IMM GRANULOCYTES NFR BLD AUTO: 0.4 %
LYMPHOCYTES # BLD AUTO: 2.33 K/UL
LYMPHOCYTES NFR BLD AUTO: 31.1 %
MAN DIFF?: NORMAL
MCHC RBC-ENTMCNC: 26.9 PG
MCHC RBC-ENTMCNC: 30.8 G/DL
MCV RBC AUTO: 87.5 FL
MONOCYTES # BLD AUTO: 0.62 K/UL
MONOCYTES NFR BLD AUTO: 8.3 %
NEUTROPHILS # BLD AUTO: 4.39 K/UL
NEUTROPHILS NFR BLD AUTO: 58.7 %
PLATELET # BLD AUTO: NORMAL K/UL
POTASSIUM SERPL-SCNC: 4.5 MMOL/L
PROT SERPL-MCNC: 6.9 G/DL
RBC # BLD: 4.87 M/UL
RBC # FLD: 15.9 %
SODIUM SERPL-SCNC: 144 MMOL/L
WBC # FLD AUTO: 7.48 K/UL

## 2024-12-10 NOTE — ED ADULT NURSE NOTE - NS_SISCREENINGSR_GEN_ALL_ED
Render In Bullet Format When Appropriate: No Number Of Freeze-Thaw Cycles: 1 freeze-thaw cycle Consent: The patient's consent was obtained including but not limited to risks of crusting, blistering, scarring, pigmentary change. Duration Of Freeze Thaw-Cycle (Seconds): 5 Post-Care Instructions: Pt may apply Vaseline to crusted or scabbing areas. Detail Level: Zone Total Number Of Aks Treated: 11 Negative

## 2025-01-29 ENCOUNTER — NON-APPOINTMENT (OUTPATIENT)
Age: 60
End: 2025-01-29

## 2025-02-14 ENCOUNTER — TRANSCRIPTION ENCOUNTER (OUTPATIENT)
Age: 60
End: 2025-02-14

## 2025-02-17 NOTE — ED ADULT TRIAGE NOTE - HEART RATE (BEATS/MIN)
80 Show Aperture Variable?: Yes Consent: The patient's consent was obtained including but not limited to risks of crusting, scabbing, blistering, scarring, darker or lighter pigmentary change, recurrence, incomplete removal and infection. Number Of Freeze-Thaw Cycles: 2 freeze-thaw cycles Duration Of Freeze Thaw-Cycle (Seconds): 0 Post-Care Instructions: I reviewed with the patient in detail post-care instructions. Patient is to wear sunprotection, and avoid picking at any of the treated lesions. Pt may apply Vaseline to crusted or scabbing areas. Render Post-Care Instructions In Note?: no Detail Level: Detailed Application Tool (Optional): Liquid Nitrogen Sprayer

## 2025-02-18 ENCOUNTER — TRANSCRIPTION ENCOUNTER (OUTPATIENT)
Age: 60
End: 2025-02-18

## 2025-02-18 ENCOUNTER — APPOINTMENT (OUTPATIENT)
Dept: OBGYN | Facility: CLINIC | Age: 60
End: 2025-02-18
Payer: COMMERCIAL

## 2025-02-18 VITALS
DIASTOLIC BLOOD PRESSURE: 96 MMHG | HEIGHT: 66 IN | HEART RATE: 81 BPM | WEIGHT: 147 LBS | SYSTOLIC BLOOD PRESSURE: 170 MMHG | BODY MASS INDEX: 23.63 KG/M2

## 2025-02-18 DIAGNOSIS — R10.2 PELVIC AND PERINEAL PAIN: ICD-10-CM

## 2025-02-18 DIAGNOSIS — N76.3 SUBACUTE AND CHRONIC VULVITIS: ICD-10-CM

## 2025-02-18 DIAGNOSIS — N95.2 POSTMENOPAUSAL ATROPHIC VAGINITIS: ICD-10-CM

## 2025-02-18 PROCEDURE — 99214 OFFICE O/P EST MOD 30 MIN: CPT

## 2025-02-18 PROCEDURE — 99459 PELVIC EXAMINATION: CPT

## 2025-02-18 RX ORDER — NYSTATIN AND TRIAMCINOLONE ACETONIDE 100000; 1 MG/G; MG/G
100000-0.1 CREAM TOPICAL TWICE DAILY
Qty: 1 | Refills: 2 | Status: ACTIVE | COMMUNITY
Start: 2025-02-18 | End: 1900-01-01

## 2025-02-20 LAB
BACTERIA UR CULT: NORMAL
BV BACTERIA RRNA VAG QL NAA+PROBE: NOT DETECTED
C GLABRATA RNA VAG QL NAA+PROBE: NOT DETECTED
C TRACH RRNA SPEC QL NAA+PROBE: NOT DETECTED
CANDIDA RRNA VAG QL PROBE: NOT DETECTED
N GONORRHOEA RRNA SPEC QL NAA+PROBE: NOT DETECTED
T VAGINALIS RRNA SPEC QL NAA+PROBE: NOT DETECTED

## 2025-02-24 ENCOUNTER — TRANSCRIPTION ENCOUNTER (OUTPATIENT)
Age: 60
End: 2025-02-24

## 2025-03-11 ENCOUNTER — TRANSCRIPTION ENCOUNTER (OUTPATIENT)
Age: 60
End: 2025-03-11

## 2025-03-18 ENCOUNTER — LABORATORY RESULT (OUTPATIENT)
Age: 60
End: 2025-03-18

## 2025-03-18 ENCOUNTER — APPOINTMENT (OUTPATIENT)
Dept: RHEUMATOLOGY | Facility: CLINIC | Age: 60
End: 2025-03-18
Payer: COMMERCIAL

## 2025-03-18 VITALS
SYSTOLIC BLOOD PRESSURE: 136 MMHG | DIASTOLIC BLOOD PRESSURE: 82 MMHG | OXYGEN SATURATION: 97 % | HEART RATE: 68 BPM | HEIGHT: 66 IN | BODY MASS INDEX: 23.46 KG/M2 | WEIGHT: 146 LBS

## 2025-03-18 DIAGNOSIS — M25.552 PAIN IN RIGHT HIP: ICD-10-CM

## 2025-03-18 DIAGNOSIS — R76.8 OTHER SPECIFIED ABNORMAL IMMUNOLOGICAL FINDINGS IN SERUM: ICD-10-CM

## 2025-03-18 DIAGNOSIS — E78.5 HYPERLIPIDEMIA, UNSPECIFIED: ICD-10-CM

## 2025-03-18 DIAGNOSIS — Z00.00 ENCOUNTER FOR GENERAL ADULT MEDICAL EXAMINATION W/OUT ABNORMAL FINDINGS: ICD-10-CM

## 2025-03-18 DIAGNOSIS — Z79.899 OTHER LONG TERM (CURRENT) DRUG THERAPY: ICD-10-CM

## 2025-03-18 DIAGNOSIS — G72.89 OTHER SPECIFIED MYOPATHIES: ICD-10-CM

## 2025-03-18 DIAGNOSIS — M25.551 PAIN IN RIGHT HIP: ICD-10-CM

## 2025-03-18 PROCEDURE — G2211 COMPLEX E/M VISIT ADD ON: CPT | Mod: NC

## 2025-03-18 PROCEDURE — 99215 OFFICE O/P EST HI 40 MIN: CPT

## 2025-03-19 ENCOUNTER — TRANSCRIPTION ENCOUNTER (OUTPATIENT)
Age: 60
End: 2025-03-19

## 2025-03-19 LAB
ALBUMIN SERPL ELPH-MCNC: 4.5 G/DL
ALDOLASE SERPL-CCNC: 4.4 U/L
ALP BLD-CCNC: 71 U/L
ALT SERPL-CCNC: 18 U/L
ANION GAP SERPL CALC-SCNC: 11 MMOL/L
APPEARANCE: CLEAR
AST SERPL-CCNC: 17 U/L
BACTERIA: NEGATIVE /HPF
BASOPHILS # BLD AUTO: 0.05 K/UL
BASOPHILS NFR BLD AUTO: 0.7 %
BILIRUB SERPL-MCNC: 0.6 MG/DL
BILIRUBIN URINE: NEGATIVE
BLOOD URINE: NEGATIVE
BUN SERPL-MCNC: 11 MG/DL
CALCIUM SERPL-MCNC: 9.5 MG/DL
CAST: 0 /LPF
CD16+CD56+ CELLS # BLD: 289 CELLS/UL
CD16+CD56+ CELLS NFR BLD: 14 %
CD19 CELLS NFR BLD: 4 CELLS/UL
CD3 CELLS # BLD: 1842 CELLS/UL
CD3 CELLS NFR BLD: 85 %
CD3+CD4+ CELLS # BLD: 1447 CELLS/UL
CD3+CD4+ CELLS NFR BLD: 66 %
CD3+CD4+ CELLS/CD3+CD8+ CLL SPEC: 3.74 RATIO
CD3+CD8+ CELLS # SPEC: 387 CELLS/UL
CD3+CD8+ CELLS NFR BLD: 18 %
CELLS.CD3-CD19+/CELLS IN BLOOD: <1 %
CHLORIDE SERPL-SCNC: 107 MMOL/L
CHOLEST SERPL-MCNC: 166 MG/DL
CK SERPL-CCNC: 100 U/L
CO2 SERPL-SCNC: 27 MMOL/L
COLOR: YELLOW
CREAT SERPL-MCNC: 0.7 MG/DL
CREAT SPEC-SCNC: 36 MG/DL
CREAT/PROT UR: NORMAL RATIO
EGFRCR SERPLBLD CKD-EPI 2021: 98 ML/MIN/1.73M2
EOSINOPHIL # BLD AUTO: 0.08 K/UL
EOSINOPHIL NFR BLD AUTO: 1.2 %
EPITHELIAL CELLS: 0 /HPF
ESTIMATED AVERAGE GLUCOSE: 126 MG/DL
FERRITIN SERPL-MCNC: 165 NG/ML
FOLATE SERPL-MCNC: 17.8 NG/ML
GLUCOSE QUALITATIVE U: NEGATIVE MG/DL
HBA1C MFR BLD HPLC: 6 %
HCT VFR BLD CALC: 41 %
HDLC SERPL-MCNC: 89 MG/DL
HGB BLD-MCNC: 13.3 G/DL
IGA SER QL IEP: 196 MG/DL
IGG SER QL IEP: 988 MG/DL
IGM SER QL IEP: 57 MG/DL
IMM GRANULOCYTES NFR BLD AUTO: 0.1 %
IRON SATN MFR SERPL: 26 %
IRON SERPL-MCNC: 77 UG/DL
KETONES URINE: NEGATIVE MG/DL
LDLC SERPL-MCNC: 67 MG/DL
LEUKOCYTE ESTERASE URINE: ABNORMAL
LYMPHOCYTES # BLD AUTO: 2.17 K/UL
LYMPHOCYTES NFR BLD AUTO: 31.3 %
MAN DIFF?: NORMAL
MCHC RBC-ENTMCNC: 28 PG
MCHC RBC-ENTMCNC: 32.4 G/DL
MCV RBC AUTO: 86.3 FL
MICROSCOPIC-UA: NORMAL
MONOCYTES # BLD AUTO: 0.56 K/UL
MONOCYTES NFR BLD AUTO: 8.1 %
MYOGLOBIN SERPL-MCNC: <21 NG/ML
NEUTROPHILS # BLD AUTO: 4.06 K/UL
NEUTROPHILS NFR BLD AUTO: 58.6 %
NITRITE URINE: NEGATIVE
NONHDLC SERPL-MCNC: 78 MG/DL
PH URINE: 7
PLATELET # BLD AUTO: NORMAL K/UL
PLATELET # PLAS AUTO: NORMAL
POTASSIUM SERPL-SCNC: 4.8 MMOL/L
PROT SERPL-MCNC: 6.9 G/DL
PROT UR-MCNC: <4 MG/DL
PROTEIN URINE: NEGATIVE MG/DL
RBC # BLD: 4.75 M/UL
RBC # FLD: 14.6 %
RED BLOOD CELLS URINE: 1 /HPF
SODIUM SERPL-SCNC: 144 MMOL/L
SPECIFIC GRAVITY URINE: 1.01
T4 FREE SERPL-MCNC: 1.2 NG/DL
TIBC SERPL-MCNC: 295 UG/DL
TRANSFERRIN SERPL-MCNC: 259 MG/DL
TRIGL SERPL-MCNC: 51 MG/DL
TSH SERPL-ACNC: 1.81 UIU/ML
UIBC SERPL-MCNC: 218 UG/DL
UROBILINOGEN URINE: 0.2 MG/DL
VIT B12 SERPL-MCNC: >2000 PG/ML
WBC # FLD AUTO: 6.93 K/UL
WHITE BLOOD CELLS URINE: 1 /HPF

## 2025-03-20 ENCOUNTER — TRANSCRIPTION ENCOUNTER (OUTPATIENT)
Age: 60
End: 2025-03-20

## 2025-03-24 LAB
ALBUMIN MFR SERPL ELPH: 60.5 %
ALBUMIN SERPL-MCNC: 4.2 G/DL
ALBUMIN/GLOB SERPL: 1.5 RATIO
ALPHA1 GLOB MFR SERPL ELPH: 4.2 %
ALPHA1 GLOB SERPL ELPH-MCNC: 0.3 G/DL
ALPHA2 GLOB MFR SERPL ELPH: 9.4 %
ALPHA2 GLOB SERPL ELPH-MCNC: 0.7 G/DL
B-GLOBULIN MFR SERPL ELPH: 12.1 %
B-GLOBULIN SERPL ELPH-MCNC: 0.8 G/DL
DEPRECATED KAPPA LC FREE/LAMBDA SER: 1.48 RATIO
GAMMA GLOB FLD ELPH-MCNC: 1 G/DL
GAMMA GLOB MFR SERPL ELPH: 13.8 %
IGA SER QL IEP: 196 MG/DL
IGG SER QL IEP: 972 MG/DL
IGM SER QL IEP: 64 MG/DL
INTERPRETATION SERPL IEP-IMP: NORMAL
KAPPA LC CSF-MCNC: 0.98 MG/DL
KAPPA LC SERPL-MCNC: 1.45 MG/DL
M PROTEIN SPEC IFE-MCNC: NORMAL
PROT SERPL-MCNC: 7 G/DL
PROT SERPL-MCNC: 7 G/DL

## 2025-04-26 ENCOUNTER — NON-APPOINTMENT (OUTPATIENT)
Age: 60
End: 2025-04-26

## 2025-04-30 ENCOUNTER — TRANSCRIPTION ENCOUNTER (OUTPATIENT)
Age: 60
End: 2025-04-30

## 2025-05-01 ENCOUNTER — TRANSCRIPTION ENCOUNTER (OUTPATIENT)
Age: 60
End: 2025-05-01

## 2025-05-15 ENCOUNTER — APPOINTMENT (OUTPATIENT)
Dept: OTOLARYNGOLOGY | Facility: CLINIC | Age: 60
End: 2025-05-15
Payer: COMMERCIAL

## 2025-05-15 DIAGNOSIS — R13.10 DYSPHAGIA, UNSPECIFIED: ICD-10-CM

## 2025-05-15 DIAGNOSIS — H92.09 OTALGIA, UNSPECIFIED EAR: ICD-10-CM

## 2025-05-15 DIAGNOSIS — H93.8X9 OTHER SPECIFIED DISORDERS OF EAR, UNSPECIFIED EAR: ICD-10-CM

## 2025-05-15 DIAGNOSIS — H90.5 UNSPECIFIED SENSORINEURAL HEARING LOSS: ICD-10-CM

## 2025-05-15 PROCEDURE — 99213 OFFICE O/P EST LOW 20 MIN: CPT | Mod: 25

## 2025-05-15 PROCEDURE — 92557 COMPREHENSIVE HEARING TEST: CPT

## 2025-05-15 PROCEDURE — 31575 DIAGNOSTIC LARYNGOSCOPY: CPT

## 2025-05-15 PROCEDURE — 92567 TYMPANOMETRY: CPT

## 2025-05-15 RX ORDER — METHYLPREDNISOLONE 4 MG/1
4 TABLET ORAL
Qty: 1 | Refills: 0 | Status: ACTIVE | COMMUNITY
Start: 2025-05-15 | End: 1900-01-01

## 2025-05-19 ENCOUNTER — RESULT REVIEW (OUTPATIENT)
Age: 60
End: 2025-05-19

## 2025-05-27 ENCOUNTER — OUTPATIENT (OUTPATIENT)
Dept: OUTPATIENT SERVICES | Facility: HOSPITAL | Age: 60
LOS: 1 days | End: 2025-05-27
Payer: COMMERCIAL

## 2025-05-27 ENCOUNTER — APPOINTMENT (OUTPATIENT)
Dept: CT IMAGING | Facility: CLINIC | Age: 60
End: 2025-05-27
Payer: COMMERCIAL

## 2025-05-27 ENCOUNTER — APPOINTMENT (OUTPATIENT)
Dept: MRI IMAGING | Facility: CLINIC | Age: 60
End: 2025-05-27
Payer: COMMERCIAL

## 2025-05-27 DIAGNOSIS — Z98.89 OTHER SPECIFIED POSTPROCEDURAL STATES: Chronic | ICD-10-CM

## 2025-05-27 DIAGNOSIS — R13.10 DYSPHAGIA, UNSPECIFIED: ICD-10-CM

## 2025-05-27 DIAGNOSIS — H90.5 UNSPECIFIED SENSORINEURAL HEARING LOSS: ICD-10-CM

## 2025-05-27 DIAGNOSIS — Z98.890 OTHER SPECIFIED POSTPROCEDURAL STATES: Chronic | ICD-10-CM

## 2025-05-27 DIAGNOSIS — Z98.51 TUBAL LIGATION STATUS: Chronic | ICD-10-CM

## 2025-05-27 PROCEDURE — 70491 CT SOFT TISSUE NECK W/DYE: CPT

## 2025-05-27 PROCEDURE — 70553 MRI BRAIN STEM W/O & W/DYE: CPT

## 2025-05-27 PROCEDURE — A9585: CPT

## 2025-05-27 PROCEDURE — 70491 CT SOFT TISSUE NECK W/DYE: CPT | Mod: 26

## 2025-05-27 PROCEDURE — 70553 MRI BRAIN STEM W/O & W/DYE: CPT | Mod: 26

## 2025-06-06 ENCOUNTER — RX RENEWAL (OUTPATIENT)
Age: 60
End: 2025-06-06

## 2025-06-09 ENCOUNTER — NON-APPOINTMENT (OUTPATIENT)
Age: 60
End: 2025-06-09

## 2025-06-18 NOTE — PHYSICAL EXAM
Reason For Visit:   Chief Complaint   Patient presents with    Surgical Followup     DOS 6/3/25 S/P Arthroplasty, hip, total, lef       There were no vitals taken for this visit.    Pain Assessment  Patient Currently in Pain: Yes (3/10 goes up to a 7/10)    Maria De Jesus Fox LPN    [de-identified] : \par Constitutional\par o Appearance : well-nourished, well developed, alert, in no acute distress \par Head and Face\par o Head :\par ¦ Inspection : atraumatic, normocephalic\par o Face :\par ¦ Inspection : no visible rash or discoloration\par Lymphatic\par o Epitrochlear Nodes : no lymphadenopathy \par o Popliteal Nodes : no palpable nodes present \par o Supraclavicular Nodes : no supraclavicular nodes present \par o Preauricular Nodes : no preauricular nodes present \par o Additional Nodes : no additional adenopathy present Skin and Subcutaneous Tissue \par o Head and Neck :\par ¦ Face : no facial lesions\par Neurologic\par o Mental Status Examination :\par ¦ Orientation : alert and oriented X 3\par o Coordination : finger-to-nose-to-finger testing normal bilaterally, heel-shin cerebellar test within normal limits bilaterally \par Psychiatric\par o Mood and Affect: mood normal, affect appropriate \par \par Right Lower Extremity\par o Ankle :\par no swelling all planes\par ¦ Inspection/Palpation : no tenderness to palpation,\par ¦ Range of Motion : arc of motion full and painless in\par ¦ Stability : no joint instability on provocative testing\par ¦ Strength : all muscles 5/5\par o Skin : no erythema or ecchymosis present\par o Sensation : sensation to pin intact\par \par o Foot:\par ¦ Inspection/Palpation : 5th metatarsal tenderness to palpation, no swelling\par ¦ Range of Motion : arc of motion full and painless in all planes\par ¦ Stability : no joint instability on provocative testing\par ¦ Strength : all muscles 5/5\par o Skin : no erythema or ecchymosis present\par \par Left Lower Extremity \par o Ankle :\par ¦ Inspection/Palpation : no tenderness to palpation, no swelling    \par ¦ Range of Motion : arc of motion full and painless in all planes\par ¦ Stability : no joint instability en provocative testing\par ¦ Strength : all muscles 5/5\par ¦ Tests and Signs : Pompa's test negative\par o Skin : no erythema or ecchymosis present\par o Sensation : sensation to pin intact\par \par o Foot:\par ¦ Inspection/Palpation : no tenderness to palpation, no swelling\par ¦ Range of Motion : arc of motion full and painless in all planes\par ¦ Stability : no joint instability on provocative testing\par ¦ Strength : all muscles 5/5\par o Skin : no erythema or ecchymosis present\par \par Gait and Station:\par Gait: gait normal, no significant extremity swelling or lymphedema, good proprioception and balance\par \par  Radiology Results \par o Right Foot: AP and lateral views of the foot were obtained and reveal healing distal 1/3 metatarsal fracture which is healing nicely

## 2025-07-01 ENCOUNTER — APPOINTMENT (OUTPATIENT)
Dept: RHEUMATOLOGY | Facility: CLINIC | Age: 60
End: 2025-07-01

## 2025-07-01 ENCOUNTER — RX RENEWAL (OUTPATIENT)
Age: 60
End: 2025-07-01

## 2025-07-01 ENCOUNTER — APPOINTMENT (OUTPATIENT)
Dept: OTOLARYNGOLOGY | Facility: CLINIC | Age: 60
End: 2025-07-01
Payer: COMMERCIAL

## 2025-07-01 VITALS
DIASTOLIC BLOOD PRESSURE: 88 MMHG | WEIGHT: 149 LBS | SYSTOLIC BLOOD PRESSURE: 142 MMHG | HEIGHT: 66 IN | HEART RATE: 54 BPM | OXYGEN SATURATION: 99 % | BODY MASS INDEX: 23.95 KG/M2

## 2025-07-01 PROBLEM — H92.02 LEFT EAR PAIN: Status: ACTIVE | Noted: 2025-07-01

## 2025-07-01 PROBLEM — R07.0 THROAT PAIN: Status: ACTIVE | Noted: 2025-07-01

## 2025-07-01 PROCEDURE — 99214 OFFICE O/P EST MOD 30 MIN: CPT | Mod: 25

## 2025-07-01 PROCEDURE — 31575 DIAGNOSTIC LARYNGOSCOPY: CPT

## 2025-07-01 RX ORDER — FAMOTIDINE 40 MG/1
40 TABLET, FILM COATED ORAL DAILY
Qty: 90 | Refills: 2 | Status: ACTIVE | COMMUNITY
Start: 2025-07-01 | End: 1900-01-01

## 2025-07-07 NOTE — ED PROVIDER NOTE - NSCAREINITIATED _GEN_ER
Abbott loop alerts on 7/4  ( 2 alerts, both on 1 PDF)    AF alerts  1 -symptom alert          
Salena Casanova(Attending)

## 2025-07-08 ENCOUNTER — APPOINTMENT (OUTPATIENT)
Dept: RHEUMATOLOGY | Facility: CLINIC | Age: 60
End: 2025-07-08
Payer: COMMERCIAL

## 2025-07-08 VITALS
OXYGEN SATURATION: 97 % | HEIGHT: 66 IN | DIASTOLIC BLOOD PRESSURE: 91 MMHG | HEART RATE: 45 BPM | SYSTOLIC BLOOD PRESSURE: 159 MMHG

## 2025-07-08 PROBLEM — R13.10 DYSPHAGIA: Status: ACTIVE | Noted: 2025-07-01

## 2025-07-08 PROCEDURE — 99215 OFFICE O/P EST HI 40 MIN: CPT

## 2025-07-08 PROCEDURE — G2211 COMPLEX E/M VISIT ADD ON: CPT | Mod: NC

## 2025-07-09 ENCOUNTER — TRANSCRIPTION ENCOUNTER (OUTPATIENT)
Age: 60
End: 2025-07-09

## 2025-07-09 LAB
25(OH)D3 SERPL-MCNC: 57.9 NG/ML
ALBUMIN SERPL ELPH-MCNC: 4.5 G/DL
ALDOLASE SERPL-CCNC: 3.8 U/L
ALP BLD-CCNC: 69 U/L
ALT SERPL-CCNC: 22 U/L
ANA SCREEN BY IMMUNOASSAY: POSITIVE
ANION GAP SERPL CALC-SCNC: 16 MMOL/L
APPEARANCE: CLEAR
AST SERPL-CCNC: 21 U/L
BACTERIA: NEGATIVE /HPF
BASOPHILS # BLD AUTO: 0.04 K/UL
BASOPHILS NFR BLD AUTO: 0.7 %
BILIRUB SERPL-MCNC: 0.6 MG/DL
BILIRUBIN URINE: NEGATIVE
BLOOD URINE: NEGATIVE
BUN SERPL-MCNC: 14 MG/DL
CALCIUM SERPL-MCNC: 9.6 MG/DL
CAST: 0 /LPF
CD16+CD56+ CELLS # BLD: 338 CELLS/UL
CD16+CD56+ CELLS NFR BLD: 14 %
CD19 CELLS NFR BLD: 8 CELLS/UL
CD3 CELLS # BLD: 2006 CELLS/UL
CD3 CELLS NFR BLD: 84 %
CD3+CD4+ CELLS # BLD: 1528 CELLS/UL
CD3+CD4+ CELLS NFR BLD: 64 %
CD3+CD4+ CELLS/CD3+CD8+ CLL SPEC: 3.24 RATIO
CD3+CD8+ CELLS # SPEC: 472 CELLS/UL
CD3+CD8+ CELLS NFR BLD: 20 %
CELLS.CD3-CD19+/CELLS IN BLOOD: <1 %
CENTROMERE B AB SER-ACNC: <0.2 AL
CHLORIDE SERPL-SCNC: 105 MMOL/L
CHROMATIN AB SERPL-ACNC: <0.2 AL
CK SERPL-CCNC: 76 U/L
CO2 SERPL-SCNC: 22 MMOL/L
COLOR: YELLOW
CREAT SERPL-MCNC: 0.65 MG/DL
CREAT SPEC-SCNC: 57 MG/DL
CREAT/PROT UR: 0.1 RATIO
CRP SERPL-MCNC: <3 MG/L
DSDNA AB SER-ACNC: <1 IU/ML
EGFRCR SERPLBLD CKD-EPI 2021: 99 ML/MIN/1.73M2
ENA JO1 AB SER-ACNC: <0.2 AL
ENA RNP AB SER-ACNC: 1.5 AL
ENA SCL70 AB SER-ACNC: <0.2 AL
ENA SM AB SER-ACNC: <0.2 AL
ENA SS-A AB SER-ACNC: 0.2 AL
ENA SS-B AB SER-ACNC: <0.2 AL
EOSINOPHIL # BLD AUTO: 0.04 K/UL
EOSINOPHIL NFR BLD AUTO: 0.7 %
EPITHELIAL CELLS: 2 /HPF
ERYTHROCYTE [SEDIMENTATION RATE] IN BLOOD BY WESTERGREN METHOD: 36 MM/HR
GLUCOSE QUALITATIVE U: NEGATIVE MG/DL
GLUCOSE SERPL-MCNC: 96 MG/DL
HCT VFR BLD CALC: 42.3 %
HGB BLD-MCNC: 13.3 G/DL
IGA SERPL-MCNC: 193 MG/DL
IGG SERPL-MCNC: 813 MG/DL
IGG SERPL-MCNC: 842 MG/DL
IGG1 SER-MCNC: 563 MG/DL
IGG2 SER-MCNC: 173 MG/DL
IGG3 SER-MCNC: 43.2 MG/DL
IGG4 SER-MCNC: 2.3 MG/DL
IGM SERPL-MCNC: 49 MG/DL
IMM GRANULOCYTES NFR BLD AUTO: 0.2 %
KETONES URINE: NEGATIVE MG/DL
LEUKOCYTE ESTERASE URINE: ABNORMAL
LYMPHOCYTES # BLD AUTO: 2.18 K/UL
LYMPHOCYTES NFR BLD AUTO: 38.4 %
MAN DIFF?: NORMAL
MCHC RBC-ENTMCNC: 27.8 PG
MCHC RBC-ENTMCNC: 31.4 G/DL
MCV RBC AUTO: 88.3 FL
MICROSCOPIC-UA: NORMAL
MONOCYTES # BLD AUTO: 0.58 K/UL
MONOCYTES NFR BLD AUTO: 10.2 %
MYELOPEROXIDASE AB SER QL IA: <0.2 AL
MYELOPEROXIDASE CELLS FLD QL: NEGATIVE
MYOGLOBIN SERPL-MCNC: <21 NG/ML
NEUTROPHILS # BLD AUTO: 2.82 K/UL
NEUTROPHILS NFR BLD AUTO: 49.8 %
NITRITE URINE: NEGATIVE
PH URINE: 6
PLATELET # BLD AUTO: NORMAL K/UL
POTASSIUM SERPL-SCNC: 4.2 MMOL/L
PROT SERPL-MCNC: 7 G/DL
PROT UR-MCNC: 6 MG/DL
PROTEIN URINE: NEGATIVE MG/DL
PROTEINASE3 AB SER IA-ACNC: <0.2 AL
PROTEINASE3 AB SER-ACNC: NEGATIVE
RBC # BLD: 4.79 M/UL
RBC # FLD: 15.2 %
RED BLOOD CELLS URINE: 6 /HPF
REVIEW: NORMAL
RIBOSOMAL P AB SER-ACNC: <0.2 AL
SODIUM SERPL-SCNC: 143 MMOL/L
SPECIFIC GRAVITY URINE: 1.01
UROBILINOGEN URINE: 0.2 MG/DL
VIABILITY: NORMAL
WBC # FLD AUTO: 5.67 K/UL
WHITE BLOOD CELLS URINE: 2 /HPF

## 2025-07-09 RX ORDER — METHYLPREDNISOLONE SODIUM SUCCINATE 125 MG/2ML
125 INJECTION, POWDER, FOR SOLUTION INTRAMUSCULAR; INTRAVENOUS
Refills: 0 | Status: ACTIVE | OUTPATIENT
Start: 2025-07-09

## 2025-07-09 RX ORDER — RITUXIMAB 10 MG/ML
500 INJECTION, SOLUTION INTRAVENOUS
Refills: 0 | Status: ACTIVE | OUTPATIENT
Start: 2025-07-09

## 2025-07-09 RX ORDER — CETIRIZINE HYDROCHLORIDE 10 MG/1
10 CAPSULE, LIQUID FILLED ORAL
Refills: 0 | Status: ACTIVE | OUTPATIENT
Start: 2025-07-09

## 2025-07-15 ENCOUNTER — TRANSCRIPTION ENCOUNTER (OUTPATIENT)
Age: 60
End: 2025-07-15

## 2025-07-15 RX ORDER — METHYLPREDNISOLONE 4 MG/1
4 TABLET ORAL
Qty: 60 | Refills: 0 | Status: ACTIVE | COMMUNITY
Start: 2025-07-15 | End: 1900-01-01

## 2025-07-16 ENCOUNTER — TRANSCRIPTION ENCOUNTER (OUTPATIENT)
Age: 60
End: 2025-07-16

## 2025-07-16 ENCOUNTER — APPOINTMENT (OUTPATIENT)
Dept: RADIOLOGY | Facility: CLINIC | Age: 60
End: 2025-07-16

## 2025-07-19 ENCOUNTER — APPOINTMENT (OUTPATIENT)
Dept: CT IMAGING | Facility: IMAGING CENTER | Age: 60
End: 2025-07-19

## 2025-07-22 ENCOUNTER — TRANSCRIPTION ENCOUNTER (OUTPATIENT)
Age: 60
End: 2025-07-22

## 2025-07-23 ENCOUNTER — TRANSCRIPTION ENCOUNTER (OUTPATIENT)
Age: 60
End: 2025-07-23

## 2025-07-24 ENCOUNTER — TRANSCRIPTION ENCOUNTER (OUTPATIENT)
Age: 60
End: 2025-07-24

## 2025-08-01 ENCOUNTER — APPOINTMENT (OUTPATIENT)
Dept: CV DIAGNOSITCS | Facility: HOSPITAL | Age: 60
End: 2025-08-01

## 2025-08-01 ENCOUNTER — OUTPATIENT (OUTPATIENT)
Dept: OUTPATIENT SERVICES | Facility: HOSPITAL | Age: 60
LOS: 1 days | End: 2025-08-01
Payer: COMMERCIAL

## 2025-08-01 ENCOUNTER — RESULT REVIEW (OUTPATIENT)
Age: 60
End: 2025-08-01

## 2025-08-01 DIAGNOSIS — G72.89 OTHER SPECIFIED MYOPATHIES: ICD-10-CM

## 2025-08-01 DIAGNOSIS — I27.20 PULMONARY HYPERTENSION, UNSPECIFIED: ICD-10-CM

## 2025-08-01 DIAGNOSIS — Z98.51 TUBAL LIGATION STATUS: Chronic | ICD-10-CM

## 2025-08-01 DIAGNOSIS — Z98.890 OTHER SPECIFIED POSTPROCEDURAL STATES: Chronic | ICD-10-CM

## 2025-08-01 DIAGNOSIS — Z98.89 OTHER SPECIFIED POSTPROCEDURAL STATES: Chronic | ICD-10-CM

## 2025-08-01 PROCEDURE — 93306 TTE W/DOPPLER COMPLETE: CPT | Mod: 26

## 2025-08-05 ENCOUNTER — NON-APPOINTMENT (OUTPATIENT)
Age: 60
End: 2025-08-05

## 2025-08-06 ENCOUNTER — APPOINTMENT (OUTPATIENT)
Dept: OTOLARYNGOLOGY | Facility: CLINIC | Age: 60
End: 2025-08-06

## 2025-08-13 ENCOUNTER — NON-APPOINTMENT (OUTPATIENT)
Age: 60
End: 2025-08-13

## 2025-08-13 ENCOUNTER — LABORATORY RESULT (OUTPATIENT)
Age: 60
End: 2025-08-13

## 2025-08-13 ENCOUNTER — APPOINTMENT (OUTPATIENT)
Dept: RHEUMATOLOGY | Facility: CLINIC | Age: 60
End: 2025-08-13
Payer: COMMERCIAL

## 2025-08-13 VITALS — HEART RATE: 78 BPM | SYSTOLIC BLOOD PRESSURE: 142 MMHG | DIASTOLIC BLOOD PRESSURE: 48 MMHG | OXYGEN SATURATION: 99 %

## 2025-08-13 VITALS
HEART RATE: 59 BPM | TEMPERATURE: 97.9 F | RESPIRATION RATE: 16 BRPM | SYSTOLIC BLOOD PRESSURE: 115 MMHG | OXYGEN SATURATION: 100 % | DIASTOLIC BLOOD PRESSURE: 63 MMHG

## 2025-08-13 VITALS
DIASTOLIC BLOOD PRESSURE: 62 MMHG | OXYGEN SATURATION: 99 % | SYSTOLIC BLOOD PRESSURE: 99 MMHG | TEMPERATURE: 98 F | HEART RATE: 62 BPM

## 2025-08-13 VITALS
HEART RATE: 58 BPM | OXYGEN SATURATION: 98 % | TEMPERATURE: 97.8 F | DIASTOLIC BLOOD PRESSURE: 61 MMHG | SYSTOLIC BLOOD PRESSURE: 103 MMHG

## 2025-08-13 PROCEDURE — 96413 CHEMO IV INFUSION 1 HR: CPT

## 2025-08-13 PROCEDURE — 96374 THER/PROPH/DIAG INJ IV PUSH: CPT | Mod: 59

## 2025-08-13 PROCEDURE — 36415 COLL VENOUS BLD VENIPUNCTURE: CPT

## 2025-08-13 PROCEDURE — 96415 CHEMO IV INFUSION ADDL HR: CPT

## 2025-08-13 RX ORDER — RITUXIMAB 10 MG/ML
500 INJECTION, SOLUTION INTRAVENOUS
Qty: 0 | Refills: 0 | Status: COMPLETED
Start: 2025-07-09

## 2025-08-13 RX ORDER — CETIRIZINE HYDROCHLORIDE 10 MG/1
10 CAPSULE, LIQUID FILLED ORAL
Qty: 0 | Refills: 0 | Status: COMPLETED
Start: 2025-07-09

## 2025-08-13 RX ORDER — METHYLPREDNISOLONE SODIUM SUCCINATE 125 MG/2ML
125 INJECTION, POWDER, FOR SOLUTION INTRAMUSCULAR; INTRAVENOUS
Qty: 0 | Refills: 0 | Status: COMPLETED
Start: 2025-07-09

## 2025-08-15 ENCOUNTER — APPOINTMENT (OUTPATIENT)
Dept: RADIOLOGY | Facility: HOSPITAL | Age: 60
End: 2025-08-15
Payer: COMMERCIAL

## 2025-08-15 ENCOUNTER — OUTPATIENT (OUTPATIENT)
Dept: OUTPATIENT SERVICES | Facility: HOSPITAL | Age: 60
LOS: 1 days | End: 2025-08-15

## 2025-08-15 DIAGNOSIS — Z98.890 OTHER SPECIFIED POSTPROCEDURAL STATES: Chronic | ICD-10-CM

## 2025-08-15 DIAGNOSIS — R13.10 DYSPHAGIA, UNSPECIFIED: ICD-10-CM

## 2025-08-15 DIAGNOSIS — Z98.89 OTHER SPECIFIED POSTPROCEDURAL STATES: Chronic | ICD-10-CM

## 2025-08-15 DIAGNOSIS — Z98.51 TUBAL LIGATION STATUS: Chronic | ICD-10-CM

## 2025-08-15 PROCEDURE — 74230 X-RAY XM SWLNG FUNCJ C+: CPT | Mod: 26

## 2025-08-18 ENCOUNTER — APPOINTMENT (OUTPATIENT)
Dept: CT IMAGING | Facility: CLINIC | Age: 60
End: 2025-08-18
Payer: COMMERCIAL

## 2025-08-18 ENCOUNTER — OUTPATIENT (OUTPATIENT)
Dept: OUTPATIENT SERVICES | Facility: HOSPITAL | Age: 60
LOS: 1 days | End: 2025-08-18
Payer: COMMERCIAL

## 2025-08-18 DIAGNOSIS — Z98.89 OTHER SPECIFIED POSTPROCEDURAL STATES: Chronic | ICD-10-CM

## 2025-08-18 DIAGNOSIS — Z00.8 ENCOUNTER FOR OTHER GENERAL EXAMINATION: ICD-10-CM

## 2025-08-18 DIAGNOSIS — Z98.51 TUBAL LIGATION STATUS: Chronic | ICD-10-CM

## 2025-08-18 DIAGNOSIS — Z98.890 OTHER SPECIFIED POSTPROCEDURAL STATES: Chronic | ICD-10-CM

## 2025-08-18 DIAGNOSIS — G72.89 OTHER SPECIFIED MYOPATHIES: ICD-10-CM

## 2025-08-18 PROCEDURE — 71250 CT THORAX DX C-: CPT

## 2025-08-18 PROCEDURE — 71250 CT THORAX DX C-: CPT | Mod: 26

## 2025-08-25 ENCOUNTER — TRANSCRIPTION ENCOUNTER (OUTPATIENT)
Age: 60
End: 2025-08-25

## 2025-08-26 ENCOUNTER — APPOINTMENT (OUTPATIENT)
Dept: OTOLARYNGOLOGY | Facility: CLINIC | Age: 60
End: 2025-08-26
Payer: COMMERCIAL

## 2025-08-26 ENCOUNTER — TRANSCRIPTION ENCOUNTER (OUTPATIENT)
Age: 60
End: 2025-08-26

## 2025-08-26 VITALS — WEIGHT: 149 LBS | BODY MASS INDEX: 23.95 KG/M2 | HEIGHT: 66 IN

## 2025-08-26 DIAGNOSIS — R13.10 DYSPHAGIA, UNSPECIFIED: ICD-10-CM

## 2025-08-26 PROCEDURE — 31575 DIAGNOSTIC LARYNGOSCOPY: CPT

## 2025-08-26 PROCEDURE — 99214 OFFICE O/P EST MOD 30 MIN: CPT | Mod: 25

## 2025-08-27 ENCOUNTER — APPOINTMENT (OUTPATIENT)
Dept: RHEUMATOLOGY | Facility: CLINIC | Age: 60
End: 2025-08-27
Payer: COMMERCIAL

## 2025-08-27 ENCOUNTER — TRANSCRIPTION ENCOUNTER (OUTPATIENT)
Age: 60
End: 2025-08-27

## 2025-08-27 VITALS
OXYGEN SATURATION: 97 % | DIASTOLIC BLOOD PRESSURE: 51 MMHG | RESPIRATION RATE: 16 BRPM | SYSTOLIC BLOOD PRESSURE: 97 MMHG | HEART RATE: 57 BPM

## 2025-08-27 VITALS
HEART RATE: 64 BPM | RESPIRATION RATE: 16 BRPM | DIASTOLIC BLOOD PRESSURE: 64 MMHG | SYSTOLIC BLOOD PRESSURE: 114 MMHG | TEMPERATURE: 98 F | OXYGEN SATURATION: 97 %

## 2025-08-27 PROCEDURE — 96374 THER/PROPH/DIAG INJ IV PUSH: CPT | Mod: 59

## 2025-08-27 PROCEDURE — 36415 COLL VENOUS BLD VENIPUNCTURE: CPT

## 2025-08-27 PROCEDURE — 96413 CHEMO IV INFUSION 1 HR: CPT

## 2025-08-27 PROCEDURE — 96415 CHEMO IV INFUSION ADDL HR: CPT

## 2025-08-27 RX ORDER — RITUXIMAB 10 MG/ML
500 INJECTION, SOLUTION INTRAVENOUS
Qty: 0 | Refills: 0 | Status: COMPLETED
Start: 2025-07-09

## 2025-08-27 RX ORDER — CETIRIZINE HYDROCHLORIDE 10 MG/1
10 CAPSULE, LIQUID FILLED ORAL
Qty: 0 | Refills: 0 | Status: COMPLETED
Start: 2025-07-09

## 2025-08-27 RX ORDER — METHYLPREDNISOLONE SODIUM SUCCINATE 125 MG/2ML
125 INJECTION, POWDER, FOR SOLUTION INTRAMUSCULAR; INTRAVENOUS
Qty: 0 | Refills: 0 | Status: COMPLETED
Start: 2025-07-09

## 2025-08-28 ENCOUNTER — TRANSCRIPTION ENCOUNTER (OUTPATIENT)
Age: 60
End: 2025-08-28

## 2025-09-15 ENCOUNTER — NON-APPOINTMENT (OUTPATIENT)
Age: 60
End: 2025-09-15

## 2025-09-16 ENCOUNTER — LABORATORY RESULT (OUTPATIENT)
Age: 60
End: 2025-09-16

## 2025-09-16 ENCOUNTER — APPOINTMENT (OUTPATIENT)
Dept: RHEUMATOLOGY | Facility: CLINIC | Age: 60
End: 2025-09-16
Payer: COMMERCIAL

## 2025-09-16 VITALS
HEART RATE: 64 BPM | BODY MASS INDEX: 23.78 KG/M2 | SYSTOLIC BLOOD PRESSURE: 138 MMHG | OXYGEN SATURATION: 97 % | WEIGHT: 148 LBS | DIASTOLIC BLOOD PRESSURE: 75 MMHG | HEIGHT: 66 IN

## 2025-09-16 DIAGNOSIS — Z51.81 ENCOUNTER FOR THERAPEUTIC DRUG LVL MONITORING: ICD-10-CM

## 2025-09-16 DIAGNOSIS — G72.89 OTHER SPECIFIED MYOPATHIES: ICD-10-CM

## 2025-09-16 DIAGNOSIS — E63.9 NUTRITIONAL DEFICIENCY, UNSPECIFIED: ICD-10-CM

## 2025-09-16 DIAGNOSIS — Z79.620 ENCOUNTER FOR THERAPEUTIC DRUG LVL MONITORING: ICD-10-CM

## 2025-09-16 PROCEDURE — G2211 COMPLEX E/M VISIT ADD ON: CPT | Mod: NC

## 2025-09-16 PROCEDURE — 99214 OFFICE O/P EST MOD 30 MIN: CPT

## 2025-09-17 ENCOUNTER — TRANSCRIPTION ENCOUNTER (OUTPATIENT)
Age: 60
End: 2025-09-17

## 2025-09-17 LAB
ALBUMIN SERPL ELPH-MCNC: 4.3 G/DL
ALP BLD-CCNC: 72 U/L
ALT SERPL-CCNC: 17 U/L
ANION GAP SERPL CALC-SCNC: 15 MMOL/L
AST SERPL-CCNC: 19 U/L
BASOPHILS # BLD AUTO: 0.04 K/UL
BASOPHILS NFR BLD AUTO: 0.5 %
BILIRUB SERPL-MCNC: 0.8 MG/DL
BUN SERPL-MCNC: 13 MG/DL
CALCIUM SERPL-MCNC: 9.4 MG/DL
CD16+CD56+ CELLS # BLD: 239 CELLS/UL
CD16+CD56+ CELLS NFR BLD: 9 %
CD19 CELLS NFR BLD: 0 CELLS/UL
CD3 CELLS # BLD: 2340 CELLS/UL
CD3 CELLS NFR BLD: 90 %
CD3+CD4+ CELLS # BLD: 1869 CELLS/UL
CD3+CD4+ CELLS NFR BLD: 71 %
CD3+CD4+ CELLS/CD3+CD8+ CLL SPEC: 4.34 RATIO
CD3+CD8+ CELLS # SPEC: 431 CELLS/UL
CD3+CD8+ CELLS NFR BLD: 16 %
CELLS.CD3-CD19+/CELLS IN BLOOD: <1 %
CHLORIDE SERPL-SCNC: 105 MMOL/L
CK SERPL-CCNC: 115 U/L
CO2 SERPL-SCNC: 21 MMOL/L
CREAT SERPL-MCNC: 0.59 MG/DL
EGFRCR SERPLBLD CKD-EPI 2021: 102 ML/MIN/1.73M2
EOSINOPHIL # BLD AUTO: 0.08 K/UL
EOSINOPHIL NFR BLD AUTO: 1 %
HCT VFR BLD CALC: 39.5 %
HGB BLD-MCNC: 13 G/DL
IMM GRANULOCYTES NFR BLD AUTO: 0.4 %
LYMPHOCYTES # BLD AUTO: 2.6 K/UL
LYMPHOCYTES NFR BLD AUTO: 32 %
MAN DIFF?: NORMAL
MCHC RBC-ENTMCNC: 28.3 PG
MCHC RBC-ENTMCNC: 32.9 G/DL
MCV RBC AUTO: 86.1 FL
MONOCYTES # BLD AUTO: 0.63 K/UL
MONOCYTES NFR BLD AUTO: 7.8 %
NEUTROPHILS # BLD AUTO: 4.74 K/UL
NEUTROPHILS NFR BLD AUTO: 58.3 %
PLATELET # BLD AUTO: NORMAL K/UL
PLATELET # PLAS AUTO: NORMAL
POTASSIUM SERPL-SCNC: 4.8 MMOL/L
PROT SERPL-MCNC: 6.7 G/DL
RBC # BLD: 4.59 M/UL
RBC # FLD: 15.3 %
SODIUM SERPL-SCNC: 141 MMOL/L
VIABILITY: NORMAL
WBC # FLD AUTO: 8.12 K/UL